# Patient Record
Sex: FEMALE | Race: WHITE | NOT HISPANIC OR LATINO | Employment: UNEMPLOYED | URBAN - METROPOLITAN AREA
[De-identification: names, ages, dates, MRNs, and addresses within clinical notes are randomized per-mention and may not be internally consistent; named-entity substitution may affect disease eponyms.]

---
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
## 2022-01-10 ENCOUNTER — HISTORICAL (OUTPATIENT)
Dept: LAB | Facility: HOSPITAL | Age: 22
End: 2022-01-10

## 2022-01-10 LAB
CREAT UR-MCNC: 160.4 MG/DL (ref 45–106)
MICROALBUMIN UR-MCNC: 350.7 UG/ML
MICROALBUMIN/CREAT RATIO PNL UR: 218.6 MG/GM CR (ref 0–30)
T4 FREE SERPL-MCNC: 0.89 NG/DL (ref 0.7–1.48)
TSH SERPL-ACNC: 5.32 UIU/ML (ref 0.35–4.94)

## 2022-02-11 ENCOUNTER — HISTORICAL (OUTPATIENT)
Dept: LAB | Facility: HOSPITAL | Age: 22
End: 2022-02-11

## 2022-04-11 ENCOUNTER — HISTORICAL (OUTPATIENT)
Dept: ADMINISTRATIVE | Facility: HOSPITAL | Age: 22
End: 2022-04-11
Payer: MEDICAID

## 2022-04-27 VITALS
BODY MASS INDEX: 40.72 KG/M2 | DIASTOLIC BLOOD PRESSURE: 70 MMHG | WEIGHT: 194 LBS | SYSTOLIC BLOOD PRESSURE: 122 MMHG | HEIGHT: 58 IN

## 2022-07-06 ENCOUNTER — OFFICE VISIT (OUTPATIENT)
Dept: FAMILY MEDICINE | Facility: CLINIC | Age: 22
End: 2022-07-06
Payer: MEDICAID

## 2022-07-06 VITALS
WEIGHT: 193 LBS | RESPIRATION RATE: 18 BRPM | OXYGEN SATURATION: 98 % | HEART RATE: 73 BPM | SYSTOLIC BLOOD PRESSURE: 110 MMHG | HEIGHT: 58 IN | BODY MASS INDEX: 40.51 KG/M2 | TEMPERATURE: 97 F | DIASTOLIC BLOOD PRESSURE: 70 MMHG

## 2022-07-06 DIAGNOSIS — J06.9 ACUTE URI: Primary | ICD-10-CM

## 2022-07-06 PROBLEM — E03.9 HYPOTHYROIDISM: Status: ACTIVE | Noted: 2019-11-07

## 2022-07-06 PROBLEM — Q90.9 DOWN SYNDROME: Status: ACTIVE | Noted: 2019-11-07

## 2022-07-06 PROBLEM — E66.01 SEVERE OBESITY DUE TO EXCESS CALORIES WITHOUT SERIOUS COMORBIDITY WITH BODY MASS INDEX (BMI) IN 99TH PERCENTILE FOR AGE IN PEDIATRIC PATIENT: Status: ACTIVE | Noted: 2021-06-22

## 2022-07-06 PROBLEM — E11.9 DIABETES MELLITUS: Status: ACTIVE | Noted: 2022-07-06

## 2022-07-06 PROBLEM — I38 HEART VALVE REGURGITATION: Status: ACTIVE | Noted: 2022-07-06

## 2022-07-06 PROCEDURE — 3008F BODY MASS INDEX DOCD: CPT | Mod: CPTII,,, | Performed by: FAMILY MEDICINE

## 2022-07-06 PROCEDURE — 3074F SYST BP LT 130 MM HG: CPT | Mod: CPTII,,, | Performed by: FAMILY MEDICINE

## 2022-07-06 PROCEDURE — 99213 OFFICE O/P EST LOW 20 MIN: CPT | Mod: ,,, | Performed by: FAMILY MEDICINE

## 2022-07-06 PROCEDURE — 1160F RVW MEDS BY RX/DR IN RCRD: CPT | Mod: CPTII,,, | Performed by: FAMILY MEDICINE

## 2022-07-06 PROCEDURE — 1159F MED LIST DOCD IN RCRD: CPT | Mod: CPTII,,, | Performed by: FAMILY MEDICINE

## 2022-07-06 PROCEDURE — 3074F PR MOST RECENT SYSTOLIC BLOOD PRESSURE < 130 MM HG: ICD-10-PCS | Mod: CPTII,,, | Performed by: FAMILY MEDICINE

## 2022-07-06 PROCEDURE — 3078F DIAST BP <80 MM HG: CPT | Mod: CPTII,,, | Performed by: FAMILY MEDICINE

## 2022-07-06 PROCEDURE — 3008F PR BODY MASS INDEX (BMI) DOCUMENTED: ICD-10-PCS | Mod: CPTII,,, | Performed by: FAMILY MEDICINE

## 2022-07-06 PROCEDURE — 1159F PR MEDICATION LIST DOCUMENTED IN MEDICAL RECORD: ICD-10-PCS | Mod: CPTII,,, | Performed by: FAMILY MEDICINE

## 2022-07-06 PROCEDURE — 3078F PR MOST RECENT DIASTOLIC BLOOD PRESSURE < 80 MM HG: ICD-10-PCS | Mod: CPTII,,, | Performed by: FAMILY MEDICINE

## 2022-07-06 PROCEDURE — 99213 PR OFFICE/OUTPT VISIT, EST, LEVL III, 20-29 MIN: ICD-10-PCS | Mod: ,,, | Performed by: FAMILY MEDICINE

## 2022-07-06 PROCEDURE — 1160F PR REVIEW ALL MEDS BY PRESCRIBER/CLIN PHARMACIST DOCUMENTED: ICD-10-PCS | Mod: CPTII,,, | Performed by: FAMILY MEDICINE

## 2022-07-06 RX ORDER — MONTELUKAST SODIUM 10 MG/1
10 TABLET ORAL NIGHTLY
Qty: 30 TABLET | Refills: 0 | Status: SHIPPED | OUTPATIENT
Start: 2022-07-06 | End: 2022-08-05

## 2022-07-06 RX ORDER — GLUCAGON 3 MG/1
POWDER NASAL
COMMUNITY
Start: 2022-06-07

## 2022-07-06 RX ORDER — CHLORPHENIR/PHENYLEPH/ASPIRIN 2-7.8-325
TABLET, EFFERVESCENT ORAL
COMMUNITY
Start: 2022-03-24

## 2022-07-06 RX ORDER — ALBUTEROL SULFATE 1.25 MG/3ML
1.25 SOLUTION RESPIRATORY (INHALATION) EVERY 6 HOURS PRN
COMMUNITY
Start: 2022-01-01 | End: 2024-01-02 | Stop reason: SDUPTHER

## 2022-07-06 RX ORDER — URINE ACETONE TEST STRIPS
STRIP MISCELLANEOUS
COMMUNITY
Start: 2022-06-29

## 2022-07-06 RX ORDER — INSULIN LISPRO 100 [IU]/ML
INJECTION, SOLUTION INTRAVENOUS; SUBCUTANEOUS
COMMUNITY
Start: 2022-06-29

## 2022-07-06 RX ORDER — BLOOD-GLUCOSE METER
EACH MISCELLANEOUS
COMMUNITY
Start: 2022-06-29

## 2022-07-06 RX ORDER — LEVOTHYROXINE SODIUM 100 UG/1
100 TABLET ORAL DAILY
COMMUNITY
Start: 2022-05-31

## 2022-07-06 RX ORDER — LANCETS 33 GAUGE
EACH MISCELLANEOUS
COMMUNITY
Start: 2022-03-24

## 2022-07-06 RX ORDER — PEN NEEDLE, DIABETIC 30 GX3/16"
NEEDLE, DISPOSABLE MISCELLANEOUS
COMMUNITY
Start: 2022-03-07

## 2022-07-06 RX ORDER — BROMPHENIRAMINE MALEATE AND PHENYLEPHRINE HYDROCHLORIDE 4; 10 MG/1; MG/1
1 TABLET, COATED ORAL EVERY 4 HOURS PRN
Qty: 30 EACH | Refills: 0 | Status: SHIPPED | OUTPATIENT
Start: 2022-07-06 | End: 2022-07-16

## 2022-07-06 RX ORDER — FLUTICASONE PROPIONATE 50 MCG
SPRAY, SUSPENSION (ML) NASAL
COMMUNITY
Start: 2021-10-02 | End: 2022-08-22

## 2022-07-06 RX ORDER — BLOOD-GLUCOSE,RECEIVER,CONT
EACH MISCELLANEOUS
COMMUNITY
Start: 2021-07-20

## 2022-07-06 RX ORDER — BLOOD-GLUCOSE SENSOR
EACH MISCELLANEOUS
COMMUNITY
Start: 2021-11-04

## 2022-07-06 RX ORDER — INSULIN GLARGINE 100 [IU]/ML
INJECTION, SOLUTION SUBCUTANEOUS
COMMUNITY

## 2022-07-06 RX ORDER — BLOOD-GLUCOSE TRANSMITTER
EACH MISCELLANEOUS
COMMUNITY
Start: 2021-11-04

## 2022-07-06 NOTE — PROGRESS NOTES
"Subjective:       Patient ID: Arlin Daniels is a 22 y.o. female.    Chief Complaint: COUGH, CONGESTION and SOB, X 2 DAYS      Cough      Review of Systems   Constitutional: Negative for chills and fever.   HENT: Positive for nasal congestion, rhinorrhea, sinus pressure/congestion and sore throat.    Eyes: Negative for itching.   Respiratory: Positive for cough.    Cardiovascular: Negative.            Objective:      /70 (BP Location: Left arm, Patient Position: Sitting, BP Method: Large (Manual))   Pulse 73   Temp 97.4 °F (36.3 °C)   Resp 18   Ht 4' 10" (1.473 m)   Wt 87.5 kg (193 lb)   SpO2 98%   BMI 40.34 kg/m²    Physical Exam  Constitutional:       General: She is not in acute distress.     Appearance: Normal appearance.   HENT:      Right Ear: Tympanic membrane and ear canal normal.      Left Ear: Tympanic membrane and ear canal normal.   Cardiovascular:      Rate and Rhythm: Normal rate and regular rhythm.   Pulmonary:      Effort: Pulmonary effort is normal.      Breath sounds: Normal breath sounds.   Musculoskeletal:         General: Normal range of motion.   Neurological:      Mental Status: She is alert.   Psychiatric:         Mood and Affect: Mood normal.         Behavior: Behavior normal.         Thought Content: Thought content normal.         Judgment: Judgment normal.             Assessment:       Problem List Items Addressed This Visit    None     Visit Diagnoses     Acute URI    -  Primary    Relevant Medications    brompheniramine-phenylephrine (RU-HIST D) 4-10 mg Tab    montelukast (SINGULAIR) 10 mg tablet           Plan:     1. Acute URI  Rx for Ruhist D  Rx her Singulair 10 mg q.p.m.  Avoid triggers  Monitor  Return to clinic with any concerns  "

## 2022-09-21 ENCOUNTER — HISTORICAL (OUTPATIENT)
Dept: ADMINISTRATIVE | Facility: HOSPITAL | Age: 22
End: 2022-09-21
Payer: MEDICAID

## 2022-11-10 ENCOUNTER — LAB VISIT (OUTPATIENT)
Dept: LAB | Facility: HOSPITAL | Age: 22
End: 2022-11-10
Payer: MEDICAID

## 2022-11-10 DIAGNOSIS — E03.9 HYPOTHYROIDISM, UNSPECIFIED TYPE: Primary | ICD-10-CM

## 2022-11-10 DIAGNOSIS — E10.9 DIABETES MELLITUS TYPE I: ICD-10-CM

## 2022-11-10 LAB
CREAT UR-MCNC: 222.9 MG/DL (ref 47–110)
MICROALBUMIN UR-MCNC: 8.7 UG/ML
MICROALBUMIN/CREAT RATIO PNL UR: 3.9 MG/GM CR (ref 0–30)
T4 FREE SERPL-MCNC: 0.87 NG/DL (ref 0.7–1.48)
TSH SERPL-ACNC: 0.41 UIU/ML (ref 0.35–4.94)

## 2022-11-10 PROCEDURE — 84439 ASSAY OF FREE THYROXINE: CPT

## 2022-11-10 PROCEDURE — 36415 COLL VENOUS BLD VENIPUNCTURE: CPT

## 2022-11-10 PROCEDURE — 82043 UR ALBUMIN QUANTITATIVE: CPT

## 2022-11-10 PROCEDURE — 84443 ASSAY THYROID STIM HORMONE: CPT

## 2023-01-09 ENCOUNTER — OFFICE VISIT (OUTPATIENT)
Dept: FAMILY MEDICINE | Facility: CLINIC | Age: 23
End: 2023-01-09
Payer: MEDICAID

## 2023-01-09 VITALS
RESPIRATION RATE: 18 BRPM | HEIGHT: 58 IN | WEIGHT: 198.19 LBS | SYSTOLIC BLOOD PRESSURE: 122 MMHG | OXYGEN SATURATION: 96 % | TEMPERATURE: 97 F | DIASTOLIC BLOOD PRESSURE: 76 MMHG | HEART RATE: 79 BPM | BODY MASS INDEX: 41.6 KG/M2

## 2023-01-09 DIAGNOSIS — N61.0 MASTITIS: Primary | ICD-10-CM

## 2023-01-09 DIAGNOSIS — R45.86 MOOD CHANGES: ICD-10-CM

## 2023-01-09 PROCEDURE — 99214 PR OFFICE/OUTPT VISIT, EST, LEVL IV, 30-39 MIN: ICD-10-PCS | Mod: ,,, | Performed by: FAMILY MEDICINE

## 2023-01-09 PROCEDURE — 3078F DIAST BP <80 MM HG: CPT | Mod: CPTII,,, | Performed by: FAMILY MEDICINE

## 2023-01-09 PROCEDURE — 1160F PR REVIEW ALL MEDS BY PRESCRIBER/CLIN PHARMACIST DOCUMENTED: ICD-10-PCS | Mod: CPTII,,, | Performed by: FAMILY MEDICINE

## 2023-01-09 PROCEDURE — 1159F MED LIST DOCD IN RCRD: CPT | Mod: CPTII,,, | Performed by: FAMILY MEDICINE

## 2023-01-09 PROCEDURE — 1160F RVW MEDS BY RX/DR IN RCRD: CPT | Mod: CPTII,,, | Performed by: FAMILY MEDICINE

## 2023-01-09 PROCEDURE — 99214 OFFICE O/P EST MOD 30 MIN: CPT | Mod: ,,, | Performed by: FAMILY MEDICINE

## 2023-01-09 PROCEDURE — 3078F PR MOST RECENT DIASTOLIC BLOOD PRESSURE < 80 MM HG: ICD-10-PCS | Mod: CPTII,,, | Performed by: FAMILY MEDICINE

## 2023-01-09 PROCEDURE — 3008F BODY MASS INDEX DOCD: CPT | Mod: CPTII,,, | Performed by: FAMILY MEDICINE

## 2023-01-09 PROCEDURE — 1159F PR MEDICATION LIST DOCUMENTED IN MEDICAL RECORD: ICD-10-PCS | Mod: CPTII,,, | Performed by: FAMILY MEDICINE

## 2023-01-09 PROCEDURE — 3074F SYST BP LT 130 MM HG: CPT | Mod: CPTII,,, | Performed by: FAMILY MEDICINE

## 2023-01-09 PROCEDURE — 3008F PR BODY MASS INDEX (BMI) DOCUMENTED: ICD-10-PCS | Mod: CPTII,,, | Performed by: FAMILY MEDICINE

## 2023-01-09 PROCEDURE — 3074F PR MOST RECENT SYSTOLIC BLOOD PRESSURE < 130 MM HG: ICD-10-PCS | Mod: CPTII,,, | Performed by: FAMILY MEDICINE

## 2023-01-09 RX ORDER — MUPIROCIN 20 MG/G
OINTMENT TOPICAL 3 TIMES DAILY
Qty: 15 G | Refills: 1 | Status: SHIPPED | OUTPATIENT
Start: 2023-01-09 | End: 2023-02-24 | Stop reason: SDUPTHER

## 2023-01-09 RX ORDER — SULFAMETHOXAZOLE AND TRIMETHOPRIM 800; 160 MG/1; MG/1
1 TABLET ORAL 2 TIMES DAILY
Qty: 20 TABLET | Refills: 0 | Status: SHIPPED | OUTPATIENT
Start: 2023-01-09 | End: 2023-01-19

## 2023-01-09 RX ORDER — ARIPIPRAZOLE 2 MG/1
2 TABLET ORAL DAILY
Qty: 30 TABLET | Refills: 11 | Status: SHIPPED | OUTPATIENT
Start: 2023-01-09 | End: 2023-03-28

## 2023-01-09 NOTE — PROGRESS NOTES
"Subjective:       Patient ID: Arlin Daniels is a 22 y.o. female.    Chief Complaint: Boils under Breast; & cough      Abscess      Review of Systems   Constitutional: Negative.    Respiratory: Negative.     Cardiovascular: Negative.    Integumentary:         Abscess: located on  left breast, using OTC triple abx   Psychiatric/Behavioral:          Mood changes: Mother reports medication no longer working as well         Objective:      /76 (BP Location: Right arm, Patient Position: Sitting, BP Method: Large (Manual))   Pulse 79   Temp 97.2 °F (36.2 °C)   Resp 18   Ht 4' 10" (1.473 m)   Wt 89.9 kg (198 lb 3.2 oz)   SpO2 96%   BMI 41.42 kg/m²    Physical Exam  Constitutional:       Appearance: Normal appearance.   Cardiovascular:      Rate and Rhythm: Normal rate and regular rhythm.      Heart sounds: Normal heart sounds.   Pulmonary:      Effort: Pulmonary effort is normal.      Breath sounds: Normal breath sounds.   Skin:     Comments: Mastitis noted on left breast   Neurological:      Mental Status: She is alert.   Psychiatric:         Mood and Affect: Mood normal.         Behavior: Behavior normal.         Thought Content: Thought content normal.         Judgment: Judgment normal.           Assessment:       Problem List Items Addressed This Visit    None  Visit Diagnoses       Mastitis    -  Primary    Relevant Medications    sulfamethoxazole-trimethoprim 800-160mg (BACTRIM DS) 800-160 mg Tab    mupirocin (BACTROBAN) 2 % ointment    Mood changes        Relevant Medications    ARIPiprazole (ABILIFY) 2 MG Tab               Plan:   1. Mastitis  -     sulfamethoxazole-trimethoprim 800-160mg (BACTRIM DS) 800-160 mg Tab; Take 1 tablet by mouth 2 (two) times daily. for 10 days  Dispense: 20 tablet; Refill: 0  -     mupirocin (BACTROBAN) 2 % ointment; Apply topically 3 (three) times daily.  Dispense: 15 g; Refill: 1  Rx Bactrim DS b.i.d. times 10 days   Rx for Bactroban topical   Wash area b.i.d. with " antibacterial soap   Monitor  Return to clinic with any concerns    2. Mood changes  -     ARIPiprazole (ABILIFY) 2 MG Tab; Take 1 tablet (2 mg total) by mouth once daily.  Dispense: 30 tablet; Refill: 11  Continue Celexa 40 mg q.day  Add Abilify 2 mg q.day  Monitor   Return to clinic with any concerns

## 2023-01-10 DIAGNOSIS — R11.2 NAUSEA AND VOMITING, UNSPECIFIED VOMITING TYPE: Primary | ICD-10-CM

## 2023-01-10 RX ORDER — ONDANSETRON 4 MG/1
4 TABLET, FILM COATED ORAL 2 TIMES DAILY
Qty: 14 TABLET | Refills: 0 | Status: SHIPPED | OUTPATIENT
Start: 2023-01-10

## 2023-01-17 DIAGNOSIS — R05.9 COUGH, UNSPECIFIED TYPE: Primary | ICD-10-CM

## 2023-01-17 RX ORDER — PROMETHAZINE HYDROCHLORIDE AND DEXTROMETHORPHAN HYDROBROMIDE 6.25; 15 MG/5ML; MG/5ML
5 SYRUP ORAL EVERY 4 HOURS PRN
Qty: 118 ML | Refills: 0 | Status: SHIPPED | OUTPATIENT
Start: 2023-01-17 | End: 2023-01-27

## 2023-02-22 DIAGNOSIS — N61.0 MASTITIS: ICD-10-CM

## 2023-02-22 DIAGNOSIS — L02.92 BOIL: Primary | ICD-10-CM

## 2023-02-22 RX ORDER — SULFAMETHOXAZOLE AND TRIMETHOPRIM 800; 160 MG/1; MG/1
1 TABLET ORAL 2 TIMES DAILY
Qty: 14 TABLET | Refills: 0 | Status: SHIPPED | OUTPATIENT
Start: 2023-02-22 | End: 2023-04-25

## 2023-02-24 DIAGNOSIS — N61.0 MASTITIS: ICD-10-CM

## 2023-02-24 RX ORDER — MUPIROCIN 20 MG/G
OINTMENT TOPICAL 2 TIMES DAILY
Qty: 15 G | Refills: 1 | Status: SHIPPED | OUTPATIENT
Start: 2023-02-24 | End: 2023-06-13 | Stop reason: SDUPTHER

## 2023-02-27 DIAGNOSIS — Q90.9 DOWN SYNDROME: ICD-10-CM

## 2023-02-27 RX ORDER — CITALOPRAM 40 MG/1
40 TABLET, FILM COATED ORAL DAILY
Qty: 30 TABLET | Refills: 1 | Status: SHIPPED | OUTPATIENT
Start: 2023-02-27 | End: 2023-03-28

## 2023-03-28 ENCOUNTER — HOSPITAL ENCOUNTER (OUTPATIENT)
Dept: RADIOLOGY | Facility: HOSPITAL | Age: 23
Discharge: HOME | End: 2023-03-28
Attending: FAMILY MEDICINE
Payer: MEDICAID

## 2023-03-28 ENCOUNTER — OFFICE VISIT (OUTPATIENT)
Dept: FAMILY MEDICINE | Facility: CLINIC | Age: 23
End: 2023-03-28
Payer: MEDICAID

## 2023-03-28 VITALS
SYSTOLIC BLOOD PRESSURE: 110 MMHG | BODY MASS INDEX: 43.45 KG/M2 | HEIGHT: 58 IN | WEIGHT: 207 LBS | OXYGEN SATURATION: 98 % | HEART RATE: 74 BPM | RESPIRATION RATE: 18 BRPM | TEMPERATURE: 97 F | DIASTOLIC BLOOD PRESSURE: 66 MMHG

## 2023-03-28 DIAGNOSIS — M25.552 BILATERAL HIP PAIN: ICD-10-CM

## 2023-03-28 DIAGNOSIS — F32.A DEPRESSION, UNSPECIFIED DEPRESSION TYPE: ICD-10-CM

## 2023-03-28 DIAGNOSIS — M25.552 BILATERAL HIP PAIN: Primary | ICD-10-CM

## 2023-03-28 DIAGNOSIS — M25.551 BILATERAL HIP PAIN: Primary | ICD-10-CM

## 2023-03-28 DIAGNOSIS — M25.551 BILATERAL HIP PAIN: ICD-10-CM

## 2023-03-28 PROCEDURE — 99214 PR OFFICE/OUTPT VISIT, EST, LEVL IV, 30-39 MIN: ICD-10-PCS | Mod: ,,, | Performed by: FAMILY MEDICINE

## 2023-03-28 PROCEDURE — 3078F PR MOST RECENT DIASTOLIC BLOOD PRESSURE < 80 MM HG: ICD-10-PCS | Mod: CPTII,,, | Performed by: FAMILY MEDICINE

## 2023-03-28 PROCEDURE — 73502 X-RAY EXAM HIP UNI 2-3 VIEWS: CPT | Mod: TC,LT

## 2023-03-28 PROCEDURE — 3074F PR MOST RECENT SYSTOLIC BLOOD PRESSURE < 130 MM HG: ICD-10-PCS | Mod: CPTII,,, | Performed by: FAMILY MEDICINE

## 2023-03-28 PROCEDURE — 1159F MED LIST DOCD IN RCRD: CPT | Mod: CPTII,,, | Performed by: FAMILY MEDICINE

## 2023-03-28 PROCEDURE — 73502 X-RAY EXAM HIP UNI 2-3 VIEWS: CPT | Mod: TC,RT

## 2023-03-28 PROCEDURE — 99214 OFFICE O/P EST MOD 30 MIN: CPT | Mod: ,,, | Performed by: FAMILY MEDICINE

## 2023-03-28 PROCEDURE — 1160F PR REVIEW ALL MEDS BY PRESCRIBER/CLIN PHARMACIST DOCUMENTED: ICD-10-PCS | Mod: CPTII,,, | Performed by: FAMILY MEDICINE

## 2023-03-28 PROCEDURE — 1159F PR MEDICATION LIST DOCUMENTED IN MEDICAL RECORD: ICD-10-PCS | Mod: CPTII,,, | Performed by: FAMILY MEDICINE

## 2023-03-28 PROCEDURE — 3074F SYST BP LT 130 MM HG: CPT | Mod: CPTII,,, | Performed by: FAMILY MEDICINE

## 2023-03-28 PROCEDURE — 3078F DIAST BP <80 MM HG: CPT | Mod: CPTII,,, | Performed by: FAMILY MEDICINE

## 2023-03-28 PROCEDURE — 1160F RVW MEDS BY RX/DR IN RCRD: CPT | Mod: CPTII,,, | Performed by: FAMILY MEDICINE

## 2023-03-28 PROCEDURE — 3008F PR BODY MASS INDEX (BMI) DOCUMENTED: ICD-10-PCS | Mod: CPTII,,, | Performed by: FAMILY MEDICINE

## 2023-03-28 PROCEDURE — 3008F BODY MASS INDEX DOCD: CPT | Mod: CPTII,,, | Performed by: FAMILY MEDICINE

## 2023-03-28 RX ORDER — DULOXETIN HYDROCHLORIDE 30 MG/1
30 CAPSULE, DELAYED RELEASE ORAL DAILY
Qty: 30 CAPSULE | Refills: 1 | Status: SHIPPED | OUTPATIENT
Start: 2023-03-28 | End: 2023-04-25 | Stop reason: SDUPTHER

## 2023-03-28 NOTE — PROGRESS NOTES
"Subjective:       Patient ID: rAlin Daniels is a 23 y.o. female.    Chief Complaint: pain in legs and hips x months       Leg pain      Review of Systems   Constitutional: Negative.    Respiratory: Negative.     Cardiovascular: Negative.    Musculoskeletal:         Bilateral hip pain: pain with ambulation, reports not walking to walk due to pain, no OTC medication, no swelling, no recent falls/trauma, fam hx of hip problems   Psychiatric/Behavioral:          Depression: reports weight gain with medication, family desires to change medication         Objective:      /66 (BP Location: Left arm, Patient Position: Sitting, BP Method: Large (Manual))   Pulse 74   Temp 97.1 °F (36.2 °C)   Resp 18   Ht 4' 10" (1.473 m)   Wt 93.9 kg (207 lb)   SpO2 98%   BMI 43.26 kg/m²    Physical Exam  Constitutional:       Appearance: Normal appearance.   Cardiovascular:      Rate and Rhythm: Normal rate and regular rhythm.      Heart sounds: Normal heart sounds.   Pulmonary:      Effort: Pulmonary effort is normal.      Breath sounds: Normal breath sounds.   Musculoskeletal:      Comments: TTP on bilateral hips, antalgic gait   Neurological:      Mental Status: She is alert.   Psychiatric:         Mood and Affect: Mood normal.         Behavior: Behavior normal.         Thought Content: Thought content normal.         Judgment: Judgment normal.             Assessment:       Problem List Items Addressed This Visit    None  Visit Diagnoses       Bilateral hip pain    -  Primary    Relevant Orders    X-Ray Hip 2 or 3 views Left (with Pelvis when performed)    X-Ray Hip 2 or 3 views Right (with Pelvis when performed)    Depression, unspecified depression type        Relevant Medications    DULoxetine (CYMBALTA) 30 MG capsule               Plan:   1. Bilateral hip pain  -     X-Ray Hip 2 or 3 views Left (with Pelvis when performed); Future; Expected date: 03/28/2023  -     X-Ray Hip 2 or 3 views Right (with Pelvis when " performed); Future; Expected date: 03/28/2023  Schedule bilateral hip x-rays   OTC NSAIDs p.r.n.  Monitor   Return to clinic with any concerns     2. Depression, unspecified depression type  -     DULoxetine (CYMBALTA) 30 MG capsule; Take 1 capsule (30 mg total) by mouth once daily.  Dispense: 30 capsule; Refill: 1  Stop Celexa/Abilify   Start Cymbalta 30 mg q.day  Relaxation technique  Monitor   Return to clinic with any concerns

## 2023-03-29 DIAGNOSIS — M25.551 BILATERAL HIP PAIN: Primary | ICD-10-CM

## 2023-03-29 DIAGNOSIS — M25.552 BILATERAL HIP PAIN: Primary | ICD-10-CM

## 2023-04-04 DIAGNOSIS — M25.551 BILATERAL HIP PAIN: Primary | ICD-10-CM

## 2023-04-04 DIAGNOSIS — M25.552 BILATERAL HIP PAIN: Primary | ICD-10-CM

## 2023-04-17 ENCOUNTER — PATIENT MESSAGE (OUTPATIENT)
Dept: ADMINISTRATIVE | Facility: HOSPITAL | Age: 23
End: 2023-04-17
Payer: MEDICAID

## 2023-04-25 ENCOUNTER — OFFICE VISIT (OUTPATIENT)
Dept: FAMILY MEDICINE | Facility: CLINIC | Age: 23
End: 2023-04-25
Payer: MEDICAID

## 2023-04-25 VITALS
SYSTOLIC BLOOD PRESSURE: 106 MMHG | TEMPERATURE: 98 F | OXYGEN SATURATION: 99 % | WEIGHT: 205.19 LBS | BODY MASS INDEX: 43.07 KG/M2 | RESPIRATION RATE: 18 BRPM | HEIGHT: 58 IN | DIASTOLIC BLOOD PRESSURE: 72 MMHG | HEART RATE: 100 BPM

## 2023-04-25 DIAGNOSIS — Z01.419 ENCOUNTER FOR ANNUAL ROUTINE GYNECOLOGICAL EXAMINATION: ICD-10-CM

## 2023-04-25 DIAGNOSIS — F32.A DEPRESSION, UNSPECIFIED DEPRESSION TYPE: Primary | ICD-10-CM

## 2023-04-25 PROCEDURE — 3008F PR BODY MASS INDEX (BMI) DOCUMENTED: ICD-10-PCS | Mod: CPTII,,, | Performed by: FAMILY MEDICINE

## 2023-04-25 PROCEDURE — 3074F PR MOST RECENT SYSTOLIC BLOOD PRESSURE < 130 MM HG: ICD-10-PCS | Mod: CPTII,,, | Performed by: FAMILY MEDICINE

## 2023-04-25 PROCEDURE — 3078F DIAST BP <80 MM HG: CPT | Mod: CPTII,,, | Performed by: FAMILY MEDICINE

## 2023-04-25 PROCEDURE — 1160F RVW MEDS BY RX/DR IN RCRD: CPT | Mod: CPTII,,, | Performed by: FAMILY MEDICINE

## 2023-04-25 PROCEDURE — 3078F PR MOST RECENT DIASTOLIC BLOOD PRESSURE < 80 MM HG: ICD-10-PCS | Mod: CPTII,,, | Performed by: FAMILY MEDICINE

## 2023-04-25 PROCEDURE — 3008F BODY MASS INDEX DOCD: CPT | Mod: CPTII,,, | Performed by: FAMILY MEDICINE

## 2023-04-25 PROCEDURE — 1160F PR REVIEW ALL MEDS BY PRESCRIBER/CLIN PHARMACIST DOCUMENTED: ICD-10-PCS | Mod: CPTII,,, | Performed by: FAMILY MEDICINE

## 2023-04-25 PROCEDURE — 1159F PR MEDICATION LIST DOCUMENTED IN MEDICAL RECORD: ICD-10-PCS | Mod: CPTII,,, | Performed by: FAMILY MEDICINE

## 2023-04-25 PROCEDURE — 1159F MED LIST DOCD IN RCRD: CPT | Mod: CPTII,,, | Performed by: FAMILY MEDICINE

## 2023-04-25 PROCEDURE — 99214 PR OFFICE/OUTPT VISIT, EST, LEVL IV, 30-39 MIN: ICD-10-PCS | Mod: ,,, | Performed by: FAMILY MEDICINE

## 2023-04-25 PROCEDURE — 3074F SYST BP LT 130 MM HG: CPT | Mod: CPTII,,, | Performed by: FAMILY MEDICINE

## 2023-04-25 PROCEDURE — 99214 OFFICE O/P EST MOD 30 MIN: CPT | Mod: ,,, | Performed by: FAMILY MEDICINE

## 2023-04-25 RX ORDER — DULOXETIN HYDROCHLORIDE 60 MG/1
60 CAPSULE, DELAYED RELEASE ORAL DAILY
Qty: 30 CAPSULE | Refills: 3 | Status: SHIPPED | OUTPATIENT
Start: 2023-04-25 | End: 2023-06-13

## 2023-04-25 NOTE — PROGRESS NOTES
"Subjective:       Patient ID: Arlin Daniels is a 23 y.o. female.    Chief Complaint: 1 month follow up      Follow-up      Review of Systems   Constitutional: Negative.    Respiratory: Negative.     Cardiovascular: Negative.    Genitourinary:         Menorrhagia   Psychiatric/Behavioral:          Depression: improving with medication, mother reports that she continues to have mood swings         Objective:      /72 (BP Location: Left arm, Patient Position: Sitting, BP Method: Large (Manual))   Pulse 100   Temp 97.6 °F (36.4 °C)   Resp 18   Ht 4' 10" (1.473 m)   Wt 93.1 kg (205 lb 3.2 oz)   SpO2 99%   BMI 42.89 kg/m²    Physical Exam  Constitutional:       Appearance: Normal appearance.   Cardiovascular:      Rate and Rhythm: Normal rate and regular rhythm.      Heart sounds: Normal heart sounds.   Pulmonary:      Effort: Pulmonary effort is normal.      Breath sounds: Normal breath sounds.   Neurological:      Mental Status: She is alert.   Psychiatric:         Mood and Affect: Mood normal.         Behavior: Behavior normal.         Thought Content: Thought content normal.         Judgment: Judgment normal.             Assessment:       Problem List Items Addressed This Visit          Psychiatric    Depression - Primary    Relevant Medications    DULoxetine (CYMBALTA) 60 MG capsule     Other Visit Diagnoses       Encounter for annual routine gynecological examination        Relevant Orders    Ambulatory referral/consult to Gynecology               Plan:   1. Depression, unspecified depression type  -     DULoxetine (CYMBALTA) 60 MG capsule; Take 1 capsule (60 mg total) by mouth once daily.  Dispense: 30 capsule; Refill: 3  Increase Cymbalta to 60 mg q.day  Relaxation technique  Monitor  Return to clinic with any concerns     2. Encounter for annual routine gynecological examination  -     Ambulatory referral/consult to Gynecology; Future; Expected date: 05/02/2023  Schedule Pap with Dr. Aguirre   "

## 2023-04-30 ENCOUNTER — HOSPITAL ENCOUNTER (EMERGENCY)
Facility: HOSPITAL | Age: 23
Discharge: HOME | End: 2023-04-30
Attending: FAMILY MEDICINE
Payer: MEDICAID

## 2023-04-30 VITALS
HEART RATE: 89 BPM | DIASTOLIC BLOOD PRESSURE: 78 MMHG | TEMPERATURE: 96 F | SYSTOLIC BLOOD PRESSURE: 128 MMHG | OXYGEN SATURATION: 98 % | RESPIRATION RATE: 20 BRPM | HEIGHT: 58 IN | BODY MASS INDEX: 43.03 KG/M2 | WEIGHT: 205 LBS

## 2023-04-30 DIAGNOSIS — A08.4 VIRAL GASTROENTERITIS: Primary | ICD-10-CM

## 2023-04-30 LAB
ABS NEUT CALC (OHS): 10.27 X10(3)/MCL (ref 2.1–9.2)
ALBUMIN SERPL-MCNC: 3.9 G/DL (ref 3.5–5)
ALBUMIN/GLOB SERPL: 1 RATIO (ref 1.1–2)
ALP SERPL-CCNC: 78 UNIT/L (ref 40–150)
ALT SERPL-CCNC: 16 UNIT/L (ref 0–55)
AST SERPL-CCNC: 19 UNIT/L (ref 5–34)
BILIRUBIN DIRECT+TOT PNL SERPL-MCNC: 0.9 MG/DL
BUN SERPL-MCNC: 18 MG/DL (ref 7–18.7)
CALCIUM SERPL-MCNC: 10 MG/DL (ref 8.4–10.2)
CHLORIDE SERPL-SCNC: 105 MMOL/L (ref 98–107)
CO2 SERPL-SCNC: 21 MMOL/L (ref 22–29)
CREAT SERPL-MCNC: 1.08 MG/DL (ref 0.55–1.02)
ERYTHROCYTE [DISTWIDTH] IN BLOOD BY AUTOMATED COUNT: 13.2 % (ref 11.5–17)
GFR SERPLBLD CREATININE-BSD FMLA CKD-EPI: >60 MLS/MIN/1.73/M2
GLOBULIN SER-MCNC: 4.1 GM/DL (ref 2.4–3.5)
GLUCOSE SERPL-MCNC: 228 MG/DL (ref 74–100)
HCT VFR BLD AUTO: 44.6 % (ref 37–47)
HGB BLD-MCNC: 15.2 G/DL (ref 12–16)
LYMPHOCYTES NFR BLD MANUAL: 0.32 X10(3)/MCL
LYMPHOCYTES NFR BLD MANUAL: 3 % (ref 13–40)
MCH RBC QN AUTO: 32.8 PG (ref 27–31)
MCHC RBC AUTO-ENTMCNC: 34.1 G/DL (ref 33–36)
MCV RBC AUTO: 96.3 FL (ref 80–94)
MONOCYTES NFR BLD MANUAL: 0.11 X10(3)/MCL (ref 0.1–1.3)
MONOCYTES NFR BLD MANUAL: 1 % (ref 2–11)
NEUTROPHILS NFR BLD MANUAL: 91 % (ref 47–80)
NEUTS BAND NFR BLD MANUAL: 5 % (ref 0–11)
NRBC BLD AUTO-RTO: 0 %
PLATELET # BLD AUTO: 201 X10(3)/MCL (ref 130–400)
PLATELET # BLD EST: ADEQUATE 10*3/UL
PMV BLD AUTO: 9.9 FL (ref 7.4–10.4)
POTASSIUM SERPL-SCNC: 4.8 MMOL/L (ref 3.5–5.1)
PROT SERPL-MCNC: 8 GM/DL (ref 6.4–8.3)
RBC # BLD AUTO: 4.63 X10(6)/MCL (ref 4.2–5.4)
SODIUM SERPL-SCNC: 138 MMOL/L (ref 136–145)
WBC # SPEC AUTO: 10.7 X10(3)/MCL (ref 4.5–11.5)

## 2023-04-30 PROCEDURE — 63600175 PHARM REV CODE 636 W HCPCS: Performed by: FAMILY MEDICINE

## 2023-04-30 PROCEDURE — 96374 THER/PROPH/DIAG INJ IV PUSH: CPT

## 2023-04-30 PROCEDURE — 96361 HYDRATE IV INFUSION ADD-ON: CPT

## 2023-04-30 PROCEDURE — 80053 COMPREHEN METABOLIC PANEL: CPT | Performed by: FAMILY MEDICINE

## 2023-04-30 PROCEDURE — 25000003 PHARM REV CODE 250: Performed by: FAMILY MEDICINE

## 2023-04-30 PROCEDURE — 85025 COMPLETE CBC W/AUTO DIFF WBC: CPT | Performed by: FAMILY MEDICINE

## 2023-04-30 PROCEDURE — 82962 GLUCOSE BLOOD TEST: CPT

## 2023-04-30 PROCEDURE — 99284 EMERGENCY DEPT VISIT MOD MDM: CPT | Mod: 25

## 2023-04-30 PROCEDURE — 85027 COMPLETE CBC AUTOMATED: CPT | Performed by: FAMILY MEDICINE

## 2023-04-30 RX ORDER — ONDANSETRON 2 MG/ML
4 INJECTION INTRAMUSCULAR; INTRAVENOUS
Status: COMPLETED | OUTPATIENT
Start: 2023-04-30 | End: 2023-04-30

## 2023-04-30 RX ORDER — DICYCLOMINE HYDROCHLORIDE 10 MG/1
20 CAPSULE ORAL
Status: COMPLETED | OUTPATIENT
Start: 2023-04-30 | End: 2023-04-30

## 2023-04-30 RX ORDER — ARIPIPRAZOLE 2 MG/1
1 TABLET ORAL DAILY
COMMUNITY
Start: 2023-02-08 | End: 2023-06-13 | Stop reason: SDUPTHER

## 2023-04-30 RX ORDER — CITALOPRAM 40 MG/1
TABLET, FILM COATED ORAL
COMMUNITY
Start: 2023-03-28 | End: 2023-06-13

## 2023-04-30 RX ORDER — ONDANSETRON 4 MG/1
4 TABLET, FILM COATED ORAL EVERY 6 HOURS
Qty: 20 TABLET | Refills: 0 | Status: SHIPPED | OUTPATIENT
Start: 2023-04-30 | End: 2023-05-05

## 2023-04-30 RX ORDER — INSULIN PMP CART,AUT,G6/7,CNTR
EACH SUBCUTANEOUS
COMMUNITY
Start: 2023-04-03

## 2023-04-30 RX ORDER — DICYCLOMINE HYDROCHLORIDE 10 MG/1
10 CAPSULE ORAL EVERY 6 HOURS PRN
Qty: 20 CAPSULE | Refills: 0 | Status: SHIPPED | OUTPATIENT
Start: 2023-04-30

## 2023-04-30 RX ADMIN — ONDANSETRON HYDROCHLORIDE 4 MG: 2 SOLUTION INTRAMUSCULAR; INTRAVENOUS at 12:04

## 2023-04-30 RX ADMIN — SODIUM CHLORIDE 1000 ML: 9 INJECTION, SOLUTION INTRAVENOUS at 12:04

## 2023-04-30 RX ADMIN — DICYCLOMINE HYDROCHLORIDE 20 MG: 10 CAPSULE ORAL at 01:04

## 2023-04-30 NOTE — ED NOTES
Pt ambulated with both parents to room 3 with steady gait.  Stated that child wok up vomiting and had an episode where she passed out at home.  Upon arrival pt was vomiting and could not stop the BM from coming in her clothing.  Pt has a hx of MR and lives at home with family.  Symtoms started today.  Mother reports that child is a type I diabetic with an insulin pump.

## 2023-04-30 NOTE — ED PROVIDER NOTES
Encounter Date: 4/30/2023       History     Chief Complaint   Patient presents with    Nausea    Vomiting    Diarrhea     N/V/D started at 3 am today. Also having weakness     This patient is a 23-year-old female, with down syndrome who comes in with nausea vomiting and diarrhea that started 3:00 a.m. this morning.  Mother states that she has had and innumerable number of diarrhea stools an approximately 5 episodes of vomiting.  She states that she put her in the bathtub to get her cleaned and when she came out of the bathtub, she passed out for few seconds.    The history is provided by the patient and a parent.   Nausea  This is a new problem. The current episode started 6 to 12 hours ago. The problem occurs constantly. The problem has not changed since onset.Pertinent negatives include no chest pain and no abdominal pain. Nothing aggravates the symptoms. Nothing relieves the symptoms. She has tried nothing for the symptoms. The treatment provided no relief.   Vomiting   Associated symptoms include diarrhea. Pertinent negatives include no abdominal pain.   Diarrhea   Associated symptoms include vomiting. Pertinent negatives include no abdominal pain.   Review of patient's allergies indicates:  No Known Allergies  Past Medical History:   Diagnosis Date    Down syndrome     Heart valve insufficiency     Hypothyroidism      Past Surgical History:   Procedure Laterality Date    TUMOR REMOVAL       History reviewed. No pertinent family history.  Social History     Tobacco Use    Smoking status: Never    Smokeless tobacco: Never   Substance Use Topics    Alcohol use: Not Currently    Drug use: Not Currently     Review of Systems   Constitutional: Negative.    HENT: Negative.     Respiratory: Negative.     Cardiovascular: Negative.  Negative for chest pain.   Gastrointestinal:  Positive for diarrhea, nausea and vomiting. Negative for abdominal pain.   Endocrine: Negative.    Musculoskeletal: Negative.    Neurological:  Negative.    Psychiatric/Behavioral: Negative.     All other systems reviewed and are negative.    Physical Exam     Initial Vitals   BP Pulse Resp Temp SpO2   04/30/23 1239 04/30/23 1234 04/30/23 1234 04/30/23 1234 04/30/23 1239   (!) 149/116 97 18 96.4 °F (35.8 °C) 96 %      MAP       --                Physical Exam    Nursing note and vitals reviewed.  Constitutional: She appears well-developed.   HENT:   Head: Normocephalic.   Eyes: Pupils are equal, round, and reactive to light.   Neck:   Normal range of motion.  Cardiovascular:  Normal rate, regular rhythm and normal heart sounds.           Pulmonary/Chest: Breath sounds normal.   Abdominal: Abdomen is soft and flat. Bowel sounds are decreased. There is no abdominal tenderness.   Musculoskeletal:         General: Normal range of motion.      Cervical back: Normal range of motion.     Neurological: She is alert and oriented to person, place, and time. GCS score is 15. GCS eye subscore is 4. GCS verbal subscore is 5. GCS motor subscore is 6.   Skin: Skin is warm and dry.   Psychiatric: She has a normal mood and affect.       ED Course   Procedures  Labs Reviewed   COMPREHENSIVE METABOLIC PANEL - Abnormal; Notable for the following components:       Result Value    Carbon Dioxide 21 (*)     Glucose Level 228 (*)     Creatinine 1.08 (*)     Globulin 4.1 (*)     Albumin/Globulin Ratio 1.0 (*)     All other components within normal limits   CBC WITH DIFFERENTIAL - Abnormal; Notable for the following components:    MCV 96.3 (*)     MCH 32.8 (*)     All other components within normal limits   MANUAL DIFFERENTIAL - Abnormal; Notable for the following components:    Neut Man 91 (*)     Lymph Man 3 (*)     Monocyte Man 1 (*)     Abs Neut calc 10.272 (*)     Abs Lymp 0.321 (*)     All other components within normal limits   CBC W/ AUTO DIFFERENTIAL    Narrative:     The following orders were created for panel order CBC auto differential.  Procedure                                Abnormality         Status                     ---------                               -----------         ------                     CBC with Differential[[id number]]        Abnormal            Final result               Manual Differential[[id number]]          Abnormal            Final result                 Please view results for these tests on the individual orders.          Imaging Results    None          Medications   sodium chloride 0.9% bolus 1,000 mL 1,000 mL (0 mLs Intravenous Stopped 4/30/23 1354)   ondansetron injection 4 mg (4 mg Intravenous Given 4/30/23 1247)   dicyclomine capsule 20 mg (20 mg Oral Given 4/30/23 1302)     Medical Decision Making:   Initial Assessment:   This patient is a 23-year-old female with Down's syndrome who comes in with her parents, complaining of nausea vomiting and diarrhea since 3:00 a.m..  Patient has had an overall amount of diarrhea and at least 5 episodes of vomiting.  Mom states that she passed out when she got out of the shower and she became concerned this point.  Patient is awake alert and oriented on arrival to the emergency room  Differential Diagnosis:   Viral gastroenteritis  Clinical Tests:   Lab Tests: Ordered and Reviewed  ED Management:  Lab work was drawn on this patient.  She is found to have a carbon dioxide of 21, a glucose of 228 and a creatinine of 1.08.  Her white blood cell count is 10.7 which is normal.  Patient given 1 L of fluids in the emergency room and some Zofran through the IV.  Patient feels better after this L done.  She is also given Bentyl 20 mg in the emergency room and written for Bentyl and Zofran for home.  Patient feels much better                        Clinical Impression:   Final diagnoses:  [A08.4] Viral gastroenteritis (Primary)        ED Disposition Condition    Discharge Stable          ED Prescriptions       Medication Sig Dispense Start Date End Date Auth. Provider    dicyclomine (BENTYL) 10 MG capsule Take 1 capsule  (10 mg total) by mouth every 6 (six) hours as needed (diarrhea). 20 capsule 4/30/2023 -- Nguyễn Dixon MD    ondansetron (ZOFRAN) 4 MG tablet Take 1 tablet (4 mg total) by mouth every 6 (six) hours. for 20 doses 20 tablet 4/30/2023 5/5/2023 Nguyễn Dixon MD          Follow-up Information       Follow up With Specialties Details Why Contact Info    Ferny Whiting MD Family Medicine Schedule an appointment as soon as possible for a visit   [address] [place] [address]  [phone]               Nguyễn Dixon MD  04/30/23 5835

## 2023-05-03 LAB — POCT GLUCOSE: 201 MG/DL (ref 70–110)

## 2023-05-15 DIAGNOSIS — F32.A DEPRESSION, UNSPECIFIED DEPRESSION TYPE: ICD-10-CM

## 2023-05-15 RX ORDER — DULOXETIN HYDROCHLORIDE 30 MG/1
CAPSULE, DELAYED RELEASE ORAL
Qty: 30 CAPSULE | Refills: 1 | Status: SHIPPED | OUTPATIENT
Start: 2023-05-15 | End: 2023-06-13

## 2023-05-26 ENCOUNTER — TELEPHONE (OUTPATIENT)
Dept: FAMILY MEDICINE | Facility: CLINIC | Age: 23
End: 2023-05-26
Payer: MEDICAID

## 2023-05-26 DIAGNOSIS — L98.9 SKIN SORE: Primary | ICD-10-CM

## 2023-05-26 RX ORDER — SULFAMETHOXAZOLE AND TRIMETHOPRIM 800; 160 MG/1; MG/1
1 TABLET ORAL 2 TIMES DAILY
Qty: 14 TABLET | Refills: 0 | Status: SHIPPED | OUTPATIENT
Start: 2023-05-26 | End: 2023-06-13

## 2023-05-26 NOTE — TELEPHONE ENCOUNTER
Patient's parent called requesting refill on bactrim for skin sores. He stated she usually gets them sent in without having to come in. I informed him I would have to verify with Dr Whiting before anything can be sent in . He stated they were leaving for vacation and would need it today. After speaking with Dr Whiting he approved the prescription refill but said she needs an appt next time. This was relayed to the parent and he verbalized understanding.

## 2023-06-13 ENCOUNTER — OFFICE VISIT (OUTPATIENT)
Dept: FAMILY MEDICINE | Facility: CLINIC | Age: 23
End: 2023-06-13
Payer: MEDICAID

## 2023-06-13 VITALS
BODY MASS INDEX: 43.58 KG/M2 | RESPIRATION RATE: 20 BRPM | HEART RATE: 107 BPM | HEIGHT: 58 IN | DIASTOLIC BLOOD PRESSURE: 80 MMHG | SYSTOLIC BLOOD PRESSURE: 126 MMHG | WEIGHT: 207.63 LBS | TEMPERATURE: 97 F | OXYGEN SATURATION: 98 %

## 2023-06-13 DIAGNOSIS — F32.A DEPRESSION, UNSPECIFIED DEPRESSION TYPE: ICD-10-CM

## 2023-06-13 DIAGNOSIS — N61.0 MASTITIS: Primary | ICD-10-CM

## 2023-06-13 PROCEDURE — 1160F RVW MEDS BY RX/DR IN RCRD: CPT | Mod: CPTII,,, | Performed by: FAMILY MEDICINE

## 2023-06-13 PROCEDURE — 99214 PR OFFICE/OUTPT VISIT, EST, LEVL IV, 30-39 MIN: ICD-10-PCS | Mod: ,,, | Performed by: FAMILY MEDICINE

## 2023-06-13 PROCEDURE — 99214 OFFICE O/P EST MOD 30 MIN: CPT | Mod: ,,, | Performed by: FAMILY MEDICINE

## 2023-06-13 PROCEDURE — 3074F SYST BP LT 130 MM HG: CPT | Mod: CPTII,,, | Performed by: FAMILY MEDICINE

## 2023-06-13 PROCEDURE — 3079F PR MOST RECENT DIASTOLIC BLOOD PRESSURE 80-89 MM HG: ICD-10-PCS | Mod: CPTII,,, | Performed by: FAMILY MEDICINE

## 2023-06-13 PROCEDURE — 1160F PR REVIEW ALL MEDS BY PRESCRIBER/CLIN PHARMACIST DOCUMENTED: ICD-10-PCS | Mod: CPTII,,, | Performed by: FAMILY MEDICINE

## 2023-06-13 PROCEDURE — 1159F PR MEDICATION LIST DOCUMENTED IN MEDICAL RECORD: ICD-10-PCS | Mod: CPTII,,, | Performed by: FAMILY MEDICINE

## 2023-06-13 PROCEDURE — 3008F BODY MASS INDEX DOCD: CPT | Mod: CPTII,,, | Performed by: FAMILY MEDICINE

## 2023-06-13 PROCEDURE — 3079F DIAST BP 80-89 MM HG: CPT | Mod: CPTII,,, | Performed by: FAMILY MEDICINE

## 2023-06-13 PROCEDURE — 3008F PR BODY MASS INDEX (BMI) DOCUMENTED: ICD-10-PCS | Mod: CPTII,,, | Performed by: FAMILY MEDICINE

## 2023-06-13 PROCEDURE — 1159F MED LIST DOCD IN RCRD: CPT | Mod: CPTII,,, | Performed by: FAMILY MEDICINE

## 2023-06-13 PROCEDURE — 3074F PR MOST RECENT SYSTOLIC BLOOD PRESSURE < 130 MM HG: ICD-10-PCS | Mod: CPTII,,, | Performed by: FAMILY MEDICINE

## 2023-06-13 RX ORDER — DICLOXACILLIN SODIUM 500 MG/1
500 CAPSULE ORAL 4 TIMES DAILY
Qty: 28 CAPSULE | Refills: 0 | Status: SHIPPED | OUTPATIENT
Start: 2023-06-13 | End: 2023-07-03

## 2023-06-13 RX ORDER — MUPIROCIN 20 MG/G
OINTMENT TOPICAL 2 TIMES DAILY
Qty: 15 G | Refills: 1 | Status: SHIPPED | OUTPATIENT
Start: 2023-06-13 | End: 2023-10-09 | Stop reason: SDUPTHER

## 2023-06-13 RX ORDER — ARIPIPRAZOLE 2 MG/1
2 TABLET ORAL DAILY
Qty: 30 TABLET | Refills: 1 | Status: SHIPPED | OUTPATIENT
Start: 2023-06-13 | End: 2023-08-11

## 2023-06-13 NOTE — PROGRESS NOTES
"Subjective:       Patient ID: Arlin Daniels is a 23 y.o. female.    Chief Complaint: abcess under bilateral breast  (Finish antibiotics last week)      sores      Review of Systems   Constitutional: Negative.  Negative for chills and fever.   Respiratory: Negative.     Cardiovascular: Negative.    Integumentary:         Sores: located below breast, painful, no drainage   Psychiatric/Behavioral:          Depression:  Family reports medication no longer working as well, increased irritability         Objective:      /80 (BP Location: Left arm, Patient Position: Sitting, BP Method: Large (Manual))   Pulse 107   Temp 97.3 °F (36.3 °C)   Resp 20   Ht 4' 10" (1.473 m)   Wt 94.2 kg (207 lb 9.6 oz)   LMP 05/27/2023   SpO2 98%   BMI 43.39 kg/m²    Physical Exam  Constitutional:       Appearance: Normal appearance.   Cardiovascular:      Rate and Rhythm: Normal rate and regular rhythm.      Heart sounds: Normal heart sounds.   Pulmonary:      Effort: Pulmonary effort is normal.      Breath sounds: Normal breath sounds.   Skin:     Comments: Small sore noted on the bottom of left breast   Neurological:      Mental Status: She is alert.   Psychiatric:         Mood and Affect: Mood normal.         Behavior: Behavior normal.         Thought Content: Thought content normal.         Judgment: Judgment normal.             Assessment:       Problem List Items Addressed This Visit          Psychiatric    Depression    Relevant Medications    ARIPiprazole (ABILIFY) 2 MG Tab     Other Visit Diagnoses       Mastitis    -  Primary    Relevant Medications    dicloxacillin (DYNAPEN) 500 MG capsule    mupirocin (BACTROBAN) 2 % ointment               Plan:   1. Mastitis  -     dicloxacillin (DYNAPEN) 500 MG capsule; Take 1 capsule (500 mg total) by mouth 4 (four) times daily. for 7 days  Dispense: 28 capsule; Refill: 0  -     mupirocin (BACTROBAN) 2 % ointment; Apply topically 2 (two) times daily.  Dispense: 15 g; Refill: " 1  Rx of Bactroban topical   Rx for dicloxacillin 500 mg q.i.d. x1 week  Wound care discussed next item monitor   Return to clinic with any concerns     2. Depression, unspecified depression type  -     ARIPiprazole (ABILIFY) 2 MG Tab; Take 1 tablet (2 mg total) by mouth once daily.  Dispense: 30 tablet; Refill: 1  Continue Cymbalta 60 mg q.day  Add Abilify 2 mg q.day   Monitor  Return to clinic with any concerns

## 2023-06-26 ENCOUNTER — PATIENT MESSAGE (OUTPATIENT)
Dept: ADMINISTRATIVE | Facility: HOSPITAL | Age: 23
End: 2023-06-26
Payer: MEDICAID

## 2023-06-29 ENCOUNTER — OFFICE VISIT (OUTPATIENT)
Dept: FAMILY MEDICINE | Facility: CLINIC | Age: 23
End: 2023-06-29
Payer: MEDICAID

## 2023-06-29 VITALS
BODY MASS INDEX: 44.12 KG/M2 | TEMPERATURE: 98 F | DIASTOLIC BLOOD PRESSURE: 76 MMHG | OXYGEN SATURATION: 100 % | HEIGHT: 58 IN | SYSTOLIC BLOOD PRESSURE: 112 MMHG | RESPIRATION RATE: 16 BRPM | WEIGHT: 210.19 LBS | HEART RATE: 82 BPM

## 2023-06-29 DIAGNOSIS — Z00.00 WELLNESS EXAMINATION: Primary | ICD-10-CM

## 2023-06-29 PROCEDURE — 3008F PR BODY MASS INDEX (BMI) DOCUMENTED: ICD-10-PCS | Mod: CPTII,,, | Performed by: FAMILY MEDICINE

## 2023-06-29 PROCEDURE — 99395 PREV VISIT EST AGE 18-39: CPT | Mod: ,,, | Performed by: FAMILY MEDICINE

## 2023-06-29 PROCEDURE — 1160F PR REVIEW ALL MEDS BY PRESCRIBER/CLIN PHARMACIST DOCUMENTED: ICD-10-PCS | Mod: CPTII,,, | Performed by: FAMILY MEDICINE

## 2023-06-29 PROCEDURE — 3074F PR MOST RECENT SYSTOLIC BLOOD PRESSURE < 130 MM HG: ICD-10-PCS | Mod: CPTII,,, | Performed by: FAMILY MEDICINE

## 2023-06-29 PROCEDURE — 1159F PR MEDICATION LIST DOCUMENTED IN MEDICAL RECORD: ICD-10-PCS | Mod: CPTII,,, | Performed by: FAMILY MEDICINE

## 2023-06-29 PROCEDURE — 3078F DIAST BP <80 MM HG: CPT | Mod: CPTII,,, | Performed by: FAMILY MEDICINE

## 2023-06-29 PROCEDURE — 1160F RVW MEDS BY RX/DR IN RCRD: CPT | Mod: CPTII,,, | Performed by: FAMILY MEDICINE

## 2023-06-29 PROCEDURE — 1159F MED LIST DOCD IN RCRD: CPT | Mod: CPTII,,, | Performed by: FAMILY MEDICINE

## 2023-06-29 PROCEDURE — 3008F BODY MASS INDEX DOCD: CPT | Mod: CPTII,,, | Performed by: FAMILY MEDICINE

## 2023-06-29 PROCEDURE — 99395 PR PREVENTIVE VISIT,EST,18-39: ICD-10-PCS | Mod: ,,, | Performed by: FAMILY MEDICINE

## 2023-06-29 PROCEDURE — 3078F PR MOST RECENT DIASTOLIC BLOOD PRESSURE < 80 MM HG: ICD-10-PCS | Mod: CPTII,,, | Performed by: FAMILY MEDICINE

## 2023-06-29 PROCEDURE — 3074F SYST BP LT 130 MM HG: CPT | Mod: CPTII,,, | Performed by: FAMILY MEDICINE

## 2023-06-29 NOTE — PROGRESS NOTES
"Subjective:       Patient ID: Arlin Daniels is a 23 y.o. female.    Chief Complaint: Annual Exam      Wellness    Diabetes  - tolerating medication (no side-effects)  - DXT: 130's  - watching diet  - followed by endocrinology, last HgA1c = 6.6    Review of Systems   Constitutional: Negative.    Respiratory: Negative.     Cardiovascular: Negative.    Gastrointestinal: Negative.    Genitourinary:         Recent gyn visit with Dr. Aguirre   Psychiatric/Behavioral: Negative.          Depression: improved with medication         Objective:      /76 (BP Location: Right arm, Patient Position: Sitting)   Pulse 82   Temp 97.7 °F (36.5 °C) (Temporal)   Resp 16   Ht 4' 10" (1.473 m)   Wt 95.3 kg (210 lb 3.2 oz)   LMP 05/27/2023   SpO2 100%   BMI 43.93 kg/m²    Physical Exam  Constitutional:       Appearance: Normal appearance.   Cardiovascular:      Rate and Rhythm: Normal rate and regular rhythm.   Pulmonary:      Effort: Pulmonary effort is normal.      Breath sounds: Normal breath sounds.   Abdominal:      General: Abdomen is flat. Bowel sounds are normal.      Palpations: Abdomen is soft.   Musculoskeletal:         General: Normal range of motion.   Neurological:      Mental Status: She is alert.   Psychiatric:         Mood and Affect: Mood normal.         Behavior: Behavior normal.         Thought Content: Thought content normal.         Judgment: Judgment normal.           Recent Results (from the past 2688 hour(s))   POCT glucose    Collection Time: 04/30/23 12:38 PM   Result Value Ref Range    POCT Glucose 201 (H) 70 - 110 mg/dL   Comprehensive metabolic panel    Collection Time: 04/30/23 12:42 PM   Result Value Ref Range    Sodium Level 138 136 - 145 mmol/L    Potassium Level 4.8 3.5 - 5.1 mmol/L    Chloride 105 98 - 107 mmol/L    Carbon Dioxide 21 (L) 22 - 29 mmol/L    Glucose Level 228 (H) 74 - 100 mg/dL    Blood Urea Nitrogen 18.0 7.0 - 18.7 mg/dL    Creatinine 1.08 (H) 0.55 - 1.02 mg/dL    " Calcium Level Total 10.0 8.4 - 10.2 mg/dL    Protein Total 8.0 6.4 - 8.3 gm/dL    Albumin Level 3.9 3.5 - 5.0 g/dL    Globulin 4.1 (H) 2.4 - 3.5 gm/dL    Albumin/Globulin Ratio 1.0 (L) 1.1 - 2.0 ratio    Bilirubin Total 0.9 <=1.5 mg/dL    Alkaline Phosphatase 78 40 - 150 unit/L    Alanine Aminotransferase 16 0 - 55 unit/L    Aspartate Aminotransferase 19 5 - 34 unit/L    eGFR >60 mls/min/1.73/m2   CBC with Differential    Collection Time: 04/30/23 12:42 PM   Result Value Ref Range    WBC 10.7 4.5 - 11.5 x10(3)/mcL    RBC 4.63 4.20 - 5.40 x10(6)/mcL    Hgb 15.2 12.0 - 16.0 g/dL    Hct 44.6 37.0 - 47.0 %    MCV 96.3 (H) 80.0 - 94.0 fL    MCH 32.8 (H) 27.0 - 31.0 pg    MCHC 34.1 33.0 - 36.0 g/dL    RDW 13.2 11.5 - 17.0 %    Platelet 201 130 - 400 x10(3)/mcL    MPV 9.9 7.4 - 10.4 fL    NRBC% 0.0 %   Manual Differential    Collection Time: 04/30/23 12:42 PM   Result Value Ref Range    Neutrophils % 91 (H) 47 - 80 %    Bands % 5 0 - 11 %    Lymphs % 3 (L) 13 - 40 %    Monocytes % 1 (L) 2 - 11 %    Neutrophils Abs Calc 10.272 (H) 2.1 - 9.2 x10(3)/mcL    Monocytes Abs 0.107 0.1 - 1.3 x10(3)/mcL    Lymphs Abs 0.321 (L) 0.6 - 4.6 x10(3)/mcL    Platelets Adequate Normal, Adequate      Assessment:       Problem List Items Addressed This Visit    None  Visit Diagnoses       Wellness examination    -  Primary               Plan:   1. Wellness examination  Lab work discussed with patient  Continue current medication  Continue diet/exercise  Return to clinic with any concerns

## 2023-07-21 DIAGNOSIS — F32.A DEPRESSION, UNSPECIFIED DEPRESSION TYPE: ICD-10-CM

## 2023-07-21 RX ORDER — DULOXETIN HYDROCHLORIDE 60 MG/1
CAPSULE, DELAYED RELEASE ORAL
Qty: 30 CAPSULE | Refills: 3 | Status: SHIPPED | OUTPATIENT
Start: 2023-07-21 | End: 2023-11-07

## 2023-08-11 DIAGNOSIS — F32.A DEPRESSION, UNSPECIFIED DEPRESSION TYPE: ICD-10-CM

## 2023-08-11 RX ORDER — ARIPIPRAZOLE 2 MG/1
2 TABLET ORAL
Qty: 30 TABLET | Refills: 1 | Status: SHIPPED | OUTPATIENT
Start: 2023-08-11 | End: 2023-09-05

## 2023-08-21 ENCOUNTER — HOSPITAL ENCOUNTER (OUTPATIENT)
Dept: RADIOLOGY | Facility: HOSPITAL | Age: 23
Discharge: HOME | End: 2023-08-21
Attending: FAMILY MEDICINE
Payer: MEDICAID

## 2023-08-21 ENCOUNTER — OFFICE VISIT (OUTPATIENT)
Dept: FAMILY MEDICINE | Facility: CLINIC | Age: 23
End: 2023-08-21
Payer: MEDICAID

## 2023-08-21 VITALS
RESPIRATION RATE: 20 BRPM | DIASTOLIC BLOOD PRESSURE: 78 MMHG | HEIGHT: 58 IN | BODY MASS INDEX: 46.18 KG/M2 | SYSTOLIC BLOOD PRESSURE: 118 MMHG | HEART RATE: 97 BPM | OXYGEN SATURATION: 98 % | TEMPERATURE: 99 F | WEIGHT: 220 LBS

## 2023-08-21 DIAGNOSIS — M54.50 ACUTE MIDLINE LOW BACK PAIN WITHOUT SCIATICA: ICD-10-CM

## 2023-08-21 DIAGNOSIS — N61.0 MASTITIS, LEFT, ACUTE: Primary | ICD-10-CM

## 2023-08-21 PROCEDURE — 1160F PR REVIEW ALL MEDS BY PRESCRIBER/CLIN PHARMACIST DOCUMENTED: ICD-10-PCS | Mod: CPTII,,, | Performed by: FAMILY MEDICINE

## 2023-08-21 PROCEDURE — 1159F MED LIST DOCD IN RCRD: CPT | Mod: CPTII,,, | Performed by: FAMILY MEDICINE

## 2023-08-21 PROCEDURE — 3078F DIAST BP <80 MM HG: CPT | Mod: CPTII,,, | Performed by: FAMILY MEDICINE

## 2023-08-21 PROCEDURE — 3078F PR MOST RECENT DIASTOLIC BLOOD PRESSURE < 80 MM HG: ICD-10-PCS | Mod: CPTII,,, | Performed by: FAMILY MEDICINE

## 2023-08-21 PROCEDURE — 1159F PR MEDICATION LIST DOCUMENTED IN MEDICAL RECORD: ICD-10-PCS | Mod: CPTII,,, | Performed by: FAMILY MEDICINE

## 2023-08-21 PROCEDURE — 3074F SYST BP LT 130 MM HG: CPT | Mod: CPTII,,, | Performed by: FAMILY MEDICINE

## 2023-08-21 PROCEDURE — 99214 OFFICE O/P EST MOD 30 MIN: CPT | Mod: ,,, | Performed by: FAMILY MEDICINE

## 2023-08-21 PROCEDURE — 72100 X-RAY EXAM L-S SPINE 2/3 VWS: CPT | Mod: TC

## 2023-08-21 PROCEDURE — 3008F BODY MASS INDEX DOCD: CPT | Mod: CPTII,,, | Performed by: FAMILY MEDICINE

## 2023-08-21 PROCEDURE — 3074F PR MOST RECENT SYSTOLIC BLOOD PRESSURE < 130 MM HG: ICD-10-PCS | Mod: CPTII,,, | Performed by: FAMILY MEDICINE

## 2023-08-21 PROCEDURE — 1160F RVW MEDS BY RX/DR IN RCRD: CPT | Mod: CPTII,,, | Performed by: FAMILY MEDICINE

## 2023-08-21 PROCEDURE — 99214 PR OFFICE/OUTPT VISIT, EST, LEVL IV, 30-39 MIN: ICD-10-PCS | Mod: ,,, | Performed by: FAMILY MEDICINE

## 2023-08-21 PROCEDURE — 3008F PR BODY MASS INDEX (BMI) DOCUMENTED: ICD-10-PCS | Mod: CPTII,,, | Performed by: FAMILY MEDICINE

## 2023-08-21 RX ORDER — CLINDAMYCIN HYDROCHLORIDE 150 MG/1
150 CAPSULE ORAL EVERY 6 HOURS
Qty: 28 CAPSULE | Refills: 0 | Status: SHIPPED | OUTPATIENT
Start: 2023-08-21 | End: 2023-08-28

## 2023-08-21 NOTE — PROGRESS NOTES
"Subjective:       Patient ID: Arlin Daniels is a 23 y.o. female.    Chief Complaint: Abscess (Under left breast)      Abscess    Abscess      Review of Systems   Constitutional: Negative.    Respiratory: Negative.     Cardiovascular: Negative.    Musculoskeletal:  Positive for back pain (Bilateral hip pain, pain radiates down legs, history of negative hip x-rays).   Integumentary:         Abscess:  Located on left breast, present times 4 days, using OTC topical, drainage initially           Objective:      /78 (BP Location: Left arm, Patient Position: Sitting, BP Method: Large (Manual))   Pulse 97   Temp 98.6 °F (37 °C)   Resp 20   Ht 4' 10" (1.473 m)   Wt 99.8 kg (220 lb)   LMP 07/20/2023 (Approximate)   SpO2 98%   BMI 45.98 kg/m²    Physical Exam  Constitutional:       Appearance: Normal appearance.   Cardiovascular:      Rate and Rhythm: Normal rate and regular rhythm.      Heart sounds: Normal heart sounds.   Pulmonary:      Effort: Pulmonary effort is normal.      Breath sounds: Normal breath sounds.   Musculoskeletal:         General: Normal range of motion.   Skin:     Comments: Left breasts: Abscess noted, mild erythema, no drainage   Neurological:      Mental Status: She is alert.   Psychiatric:         Mood and Affect: Mood normal.         Behavior: Behavior normal.         Thought Content: Thought content normal.         Judgment: Judgment normal.               Assessment:       Problem List Items Addressed This Visit    None  Visit Diagnoses       Mastitis, left, acute    -  Primary    Relevant Medications    clindamycin (CLEOCIN) 150 MG capsule    Acute midline low back pain without sciatica        Relevant Orders    X-Ray Lumbar Spine Ap And Lateral               Plan:   1. Mastitis, left, acute  -     clindamycin (CLEOCIN) 150 MG capsule; Take 1 capsule (150 mg total) by mouth every 6 (six) hours. for 7 days  Dispense: 28 capsule; Refill: 0  Rx for clindamycin 150 mg q.i.d. x7 days "   Wound care discussed   Monitor   Return to clinic with any concerns     2. Acute midline low back pain without sciatica  -     X-Ray Lumbar Spine Ap And Lateral; Future; Expected date: 08/21/2023  Schedule L-spine x-ray

## 2023-08-24 DIAGNOSIS — M54.16 LUMBAR RADICULOPATHY: Primary | ICD-10-CM

## 2023-08-29 ENCOUNTER — CLINICAL SUPPORT (OUTPATIENT)
Dept: REHABILITATION | Facility: HOSPITAL | Age: 23
End: 2023-08-29
Attending: FAMILY MEDICINE
Payer: MEDICAID

## 2023-08-29 DIAGNOSIS — M54.16 LUMBAR RADICULOPATHY: ICD-10-CM

## 2023-08-29 PROCEDURE — 97163 PT EVAL HIGH COMPLEX 45 MIN: CPT

## 2023-08-29 PROCEDURE — 97110 THERAPEUTIC EXERCISES: CPT

## 2023-08-29 NOTE — PROGRESS NOTES
[facility]  Physical Therapy Initial Evaluation    Name: Arlin Daniels  Clinic Number: [id number]    Therapy Diagnosis: No diagnosis found.  Physician: Ferny Whiting MD    Physician Orders: PT Eval and Treat  Medical Diagnosis from Referral: lumbar pain with radiculopathy  Evaluation Date: 8/29/2023  Authorization Period Expiration: medically necessary  Plan of Care Expiration: 11/29/2023  Visit # / Visits authorized: 0/ medically necessary    Time In: 315  Time Out: 400  Total Appointment Time (timed & untimed codes): 30 minutes  Total Treatment time (time-based codes) separate from Evaluation: 15 minutes    Pertinent History: h/o B hip and LBP w radiculopathy B LE, Down Syndrome    Subjective     Arlin is a 23 year old Down Syndrome patient reporting pain at low back and in both hips when she is trying to walk on the beach, walk in the stores,  sea shells or stand up to play her games.  Mom is present and says Arlin is unable to walk throughout [facility] without sitting for rest.  They report a significant weight gain of greater than 70# over the past year since she graduated from high school and become sedentary at home.    Imaging Lumbar X-rays 8/21/2023 with no significant findings per Dr Whiting      Pain level and location: 8/10 with Zuniga-Baker    Functional Limitations: standing to play games, bending over to  shells, walking on the beach, unable to walk in grocery stores  Goals: to not have pain and be able to walk in stores  Prior therapy/intervention: none      Medical History:   Past Medical History:   Diagnosis Date    Down syndrome     Heart valve insufficiency     Hypothyroidism        Surgical History:   Arlin Daniels  has a past surgical history that includes Tumor removal.    Medications:   Arlin has a current medication list which includes the following prescription(s): ketone urine test, albuterol, aripiprazole, baqsimi, blood sugar diagnostic,  dexcom g6 , dexcom g6 sensor, dexcom g6 transmitter, dicyclomine, duloxetine, fluticasone propionate, humalog kwikpen insulin, insulin glargine, ketostix, lancets, levothyroxine, loratadine, mupirocin, omnipod 5 g6 pods (gen 5), ondansetron, onetouch verio test strips, and pen needle, diabetic.    Allergies:   Review of patient's allergies indicates:  No Known Allergies       Prior Level of Function: able to walk as much as she wants  Current Level of Function: unable to tolerate walking in stores with mom      CMS Impairment/Limitation/Restriction for FOTO Survey  Therapist reviewed FOTO scores for Arlin Daniels on 2023. FOTO documents entered into [employer] - see Media section.  Patient's Physical FS Primary Measure: 35  Risk Adjusted Statistical FOTO: 43  Limitation Score: 65%  Category: Mobility  MDQ: 56% disability    Objective     Eval 2023   Pain    8/10 with faces pain chart   Posture: patient has increased lumbar lordosis and obese abdomen    Gait Analysis: slight antalgic gait pattern at right LE, knee recurvatum preference     Myotomes    HIP FLX - Right 3/5, Left 4/5  HIP EXT - Right 3/5, Left 4/5    HIP ABD - Right 3/5, Left 4/5    KNEE FLX - Right 3/5, Left 4/5  KNEE EXT - Right 3/5, Left 4/5  ANKLE DF - Right 3/5, Left 4/5         Flexibility    HS = 60 B  Piriformis = min restr B       AROM    Normal range of motion at hips    Flexion: 100 B hips  Extension: 25 B hips           Special Tests    Jonatan Test: + B  90/90 Hamstrin B  Piriformis Test: min restr B       Functional Testing    5x STS = 20 sec    Balance    Tandem R forward = 3 sec w max sway  Tandem L forward = 3 sec w max sway  SLS R = unable  SLS L = unable    TUG = 10 sec  TUG w water carry = 12 sec    2 MWT = 256'            TREATMENT     Arlin received the treatments listed below:       Time Activities   Manual     TherAct     TherEx 15 min Walking and HEP   Gait     Neuro Re-ed     Modalities     E-Stim     Dry  Needling     Canalith Repositioning         Home Exercises and Patient Education Provided    Education provided:   -Plan of care, HEP, walking frequently during the day    Written Home Exercises Provided:  no .  Exercises were reviewed and Arlin was able to demonstrate them prior to the end of the session.  Arlin demonstrated good  understanding of the education provided.       Assessment   Arlin is a 23 y.o. female referred to outpatient Physical Therapy with a medical diagnosis of lumbar pain with radiculopathy B LE's. She complains mostly of B hip pain.    Patient presents with typical low tone Down Syndrome presentation and hypermobile joints.  She has recent weight gain of >70# since graduating from high school and developing a very sedentary lifestyle at home.  She has pain at B hips and R knee.  She does have anterior sacral preference with decreased flexibility at hip flexors.  She has good MMT, but poor muscle endurance with quick fatigue during prolonged hold or functional activity. She has decreased balance scores and poor functional activity tolerance.    Patient will benefit from skilled outpatient Physical Therapy to address the deficits stated above, provide education, and to maximize patient's level of independence.     Patient prognosis is Good.     Plan of care discussed with patient: Yes  Patient's spiritual, cultural and educational needs considered and patient is agreeable to the plan of care and goals as stated below:     Anticipated Barriers for therapy: Down Syndrome    Goals:  Short Term Goals: 6 weeks   Patient will report at least 10% disability reduction from initial MDQ score to indicate clinically significant functional improvement  Patient will report at least 10 point increase on initial FOTO score to indicate clinically significant functional improvement  Patient will report 10% pain reduction    Long Term Goals: 12 weeks   Patient will report at least 20% disability reduction from  initial MDQ score to indicate clinically significant functional improvement  Patient will report at least 20% increase on initial FOTO score to indicate clinically significant functional improvement  Patient will report 50% overall perceived improvement  Patient will report ability to walk in grocery store without pain at B hips  Patient will demonstrate independence and compliance with HEP    Plan   Plan of care Certification: 8/29/2023 to 11/29/2023.    Outpatient Physical Therapy 2 times weekly for 12 weeks to include the following interventions: Cervical/Lumbar Traction, Gait Training, Manual Therapy, Moist Heat/ Ice, Neuromuscular Re-ed, Patient Education, Self Care, Therapeutic Activities, and Therapeutic Exercise.     Toma Dave, PT

## 2023-08-31 ENCOUNTER — CLINICAL SUPPORT (OUTPATIENT)
Dept: REHABILITATION | Facility: HOSPITAL | Age: 23
End: 2023-08-31
Payer: MEDICAID

## 2023-08-31 DIAGNOSIS — M54.16 LUMBAR RADICULOPATHY: Primary | ICD-10-CM

## 2023-08-31 PROCEDURE — 97110 THERAPEUTIC EXERCISES: CPT

## 2023-08-31 PROCEDURE — 97530 THERAPEUTIC ACTIVITIES: CPT

## 2023-09-01 ENCOUNTER — HOSPITAL ENCOUNTER (OUTPATIENT)
Dept: RADIOLOGY | Facility: HOSPITAL | Age: 23
Discharge: HOME | End: 2023-09-01
Attending: FAMILY MEDICINE
Payer: MEDICAID

## 2023-09-01 DIAGNOSIS — M54.10 RADICULOPATHY: Primary | ICD-10-CM

## 2023-09-01 DIAGNOSIS — M54.16 LUMBAR RADICULOPATHY: ICD-10-CM

## 2023-09-01 PROCEDURE — 72148 MRI LUMBAR SPINE W/O DYE: CPT | Mod: TC

## 2023-09-01 NOTE — PLAN OF CARE
Physical Therapy Treatment Note     Name: Arlin Daniels  Clinic Number: [id number]    Therapy Diagnosis:   Encounter Diagnosis   Name Primary?    Lumbar radiculopathy Yes     Physician: Ferny Whiting MD    Visit Date: 2023    Physician Orders: PT Eval and Treat  Medical Diagnosis from Referral: lumbar pain with radiculopathy  Evaluation Date: 2023  Authorization Period Expiration: medically necessary  Plan of Care Expiration: 2023  Visit # / Visits authorized: 0/ medically necessary     Time In: 255  Time Out: 335  Total Billable Time: 40 minutes    Pertinent History: h/o B hip and LBP w radiculopathy B LE, Down Syndrome    Subjective     Patient reports: she is not really wanting to do therapy today.  She presents slightly agitated and parents say this is a normal reaction for her.    CMS Impairment/Limitation/Restriction for FOTO Survey  Therapist reviewed FOTO scores for Arlin Daniels on 2023. FOTO documents entered into [employer] - see Media section.  Patient's Physical FS Primary Measure: 35  Risk Adjusted Statistical FOTO: 43  Limitation Score: 65%  Category: Mobility  MDQ: 56% disability    Objective     Eval 2023   Pain     8/10 with faces pain chart   Posture: patient has increased lumbar lordosis and obese abdomen     Gait Analysis: slight antalgic gait pattern at right LE, knee recurvatum preference      Myotomes     HIP FLX - Right 3/5, Left 4/5  HIP EXT - Right 3/5, Left 4/5     HIP ABD - Right 3/5, Left 4/5     KNEE FLX - Right 3/5, Left 4/5  KNEE EXT - Right 3/5, Left 4/5  ANKLE DF - Right 3/5, Left 4/5            Flexibility     HS = 60 B  Piriformis = min restr B         AROM     Normal range of motion at hips     Flexion: 100 B hips  Extension: 25 B hips               Special Tests     Jonatan Test: + B  90/90 Hamstrin B  Piriformis Test: min restr B         Functional Testing     5x STS = 20 sec     Balance     Tandem R forward = 3 sec w max sway  Tandem L forward  = 3 sec w max sway  SLS R = unable  SLS L = unable     TUG = 10 sec  TUG w water carry = 12 sec     2 MWT = 256'     Arlin received the following treatment:     Time Activities   Manual min    TherAct 30 min Swiss ball core focus seated activity, foam balance isometric recruitment activity   TherEx 10 min Core and LE and hip girdle strengthening   Gait     Neuro Re-ed     Modalities     E-Stim     Dry Needling     Canalith Repositioning           Home Exercises Provided and Patient Education Provided     Education provided:   -Plan of care, HEP, walking    Assessment     Exercise focus on functional core, low back and hip girdle recruitment and activity tolerance due to cognitive limitations.  Patient was guarded and hesitant at start of therapy, but was able to tolerate good session with coaxing and play.    Patient reports no pain at end of session.    Feel patient will benefit from therapy for progressive core and hip girdle and LE strengthening and improved activity tolerance.    Patient prognosis is Good.      Anticipated barriers to physical therapy: cognitive limitations with behavioral dysfunction at times.    Goals: Arlin Is progressing well towards her goals.  Short Term Goals: 6 weeks   Patient will report at least 10% disability reduction from initial MDQ score to indicate clinically significant functional improvement  Patient will report at least 10 point increase on initial FOTO score to indicate clinically significant functional improvement  Patient will report 10% pain reduction     Long Term Goals: 12 weeks   Patient will report at least 20% disability reduction from initial MDQ score to indicate clinically significant functional improvement  Patient will report at least 20% increase on initial FOTO score to indicate clinically significant functional improvement  Patient will report 50% overall perceived improvement  Patient will report ability to walk in grocery store without pain at B  hips  Patient will demonstrate independence and compliance with HEP      Plan     2-3x/week x 12 weeks    Toma Dave, PT

## 2023-09-05 ENCOUNTER — CLINICAL SUPPORT (OUTPATIENT)
Dept: REHABILITATION | Facility: HOSPITAL | Age: 23
End: 2023-09-05
Payer: MEDICAID

## 2023-09-05 DIAGNOSIS — M54.16 LUMBAR RADICULOPATHY: Primary | ICD-10-CM

## 2023-09-05 DIAGNOSIS — T78.40XD ALLERGY, SUBSEQUENT ENCOUNTER: ICD-10-CM

## 2023-09-05 DIAGNOSIS — F32.A DEPRESSION, UNSPECIFIED DEPRESSION TYPE: ICD-10-CM

## 2023-09-05 PROCEDURE — 97110 THERAPEUTIC EXERCISES: CPT

## 2023-09-05 PROCEDURE — 97530 THERAPEUTIC ACTIVITIES: CPT

## 2023-09-05 RX ORDER — ARIPIPRAZOLE 2 MG/1
2 TABLET ORAL
Qty: 30 TABLET | Refills: 1 | Status: SHIPPED | OUTPATIENT
Start: 2023-09-05 | End: 2023-11-07

## 2023-09-05 RX ORDER — LORATADINE 10 MG/1
TABLET ORAL
Qty: 30 TABLET | Refills: 3 | Status: SHIPPED | OUTPATIENT
Start: 2023-09-05

## 2023-09-05 NOTE — PLAN OF CARE
Physical Therapy Treatment Note     Name: Arlin Daniels  Clinic Number: [id number]    Therapy Diagnosis:   Encounter Diagnosis   Name Primary?    Lumbar radiculopathy Yes     Physician: Ferny Whiting MD    Visit Date: 2023    Physician Orders: PT Eval and Treat  Medical Diagnosis from Referral: lumbar pain with radiculopathy  Evaluation Date: 2023  Authorization Period Expiration: medically necessary  Plan of Care Expiration: 2023  Visit # / Visits authorized: 2/ medically necessary     Time In: 300  Time Out: 340  Total Billable Time: 40 minutes    Pertinent History: h/o B hip and LBP w radiculopathy B LE, Down Syndrome    Subjective     Patient reports: she walked on the beach this weekend and is having a little back pain today    CMS Impairment/Limitation/Restriction for FOTO Survey  Therapist reviewed FOTO scores for Arlin Daniels on 2023. FOTO documents entered into [employer] - see Media section.  Patient's Physical FS Primary Measure: 35  Risk Adjusted Statistical FOTO: 43  Limitation Score: 65%  Category: Mobility  MDQ: 56% disability    Objective     Eval 2023   Pain     8/10 with faces pain chart   Posture: patient has increased lumbar lordosis and obese abdomen     Gait Analysis: slight antalgic gait pattern at right LE, knee recurvatum preference      Myotomes     HIP FLX - Right 3/5, Left 4/5  HIP EXT - Right 3/5, Left 4/5     HIP ABD - Right 3/5, Left 4/5     KNEE FLX - Right 3/5, Left 4/5  KNEE EXT - Right 3/5, Left 4/5  ANKLE DF - Right 3/5, Left 4/5            Flexibility     HS = 60 B  Piriformis = min restr B         AROM     Normal range of motion at hips     Flexion: 100 B hips  Extension: 25 B hips               Special Tests     Jonatan Test: + B  90/90 Hamstrin B  Piriformis Test: min restr B         Functional Testing     5x STS = 20 sec     Balance     Tandem R forward = 3 sec w max sway  Tandem L forward = 3 sec w max sway  SLS R = unable  SLS L = unable      TUG = 10 sec  TUG w water carry = 12 sec     2 MWT = 256'     Arlin received the following treatment:     Time Activities   Manual min    TherAct 30 min Swiss ball core focus seated activity, foam balance isometric recruitment activity   TherEx 10 min Core and LE and hip girdle strengthening   Gait     Neuro Re-ed     Modalities     E-Stim     Dry Needling     Canalith Repositioning           Home Exercises Provided and Patient Education Provided     Education provided:   -Plan of care, HEP, walking    Assessment     Exercise focus on functional core, low back and hip girdle recruitment and activity tolerance.  Patient participated well and was able to tolerate increased core recruitment exercise.  She appears to have increased pain complaints with flexion stress and has moderate difficulty with core recruitment, but is able to complete all exercises.    Patient reports persistent low back pain at end of session.    Feel patient will benefit from therapy for progressive core and hip girdle and LE strengthening and improved activity tolerance.    Patient prognosis is Good.      Anticipated barriers to physical therapy: cognitive limitations with behavioral dysfunction at times.    Goals: Arlin Is progressing well towards her goals.  Short Term Goals: 6 weeks   Patient will report at least 10% disability reduction from initial MDQ score to indicate clinically significant functional improvement  Patient will report at least 10 point increase on initial FOTO score to indicate clinically significant functional improvement  Patient will report 10% pain reduction     Long Term Goals: 12 weeks   Patient will report at least 20% disability reduction from initial MDQ score to indicate clinically significant functional improvement  Patient will report at least 20% increase on initial FOTO score to indicate clinically significant functional improvement  Patient will report 50% overall perceived improvement  Patient will  report ability to walk in grocery store without pain at B hips  Patient will demonstrate independence and compliance with HEP      Plan     2-3x/week x 12 weeks    Toma Dave, PT

## 2023-09-07 ENCOUNTER — CLINICAL SUPPORT (OUTPATIENT)
Dept: REHABILITATION | Facility: HOSPITAL | Age: 23
End: 2023-09-07
Payer: MEDICAID

## 2023-09-07 DIAGNOSIS — M54.16 LUMBAR RADICULOPATHY: Primary | ICD-10-CM

## 2023-09-07 PROCEDURE — 97530 THERAPEUTIC ACTIVITIES: CPT

## 2023-09-07 PROCEDURE — 97110 THERAPEUTIC EXERCISES: CPT

## 2023-09-07 NOTE — PLAN OF CARE
"  Physical Therapy Treatment Note     Name: Arlin Daniels  Clinic Number: [id number]    Therapy Diagnosis:   Encounter Diagnosis   Name Primary?    Lumbar radiculopathy Yes     Physician: Ferny Whiting MD    Visit Date: 2023    Physician Orders: PT Eval and Treat  Medical Diagnosis from Referral: lumbar pain with radiculopathy  Evaluation Date: 2023  Authorization Period Expiration: medically necessary  Plan of Care Expiration: 2023  Visit # / Visits authorized: 3/ medically necessary     Time In: 146  Time Out: 230  Total Billable Time: 44 minutes    Pertinent History: h/o B hip and LBP w radiculopathy B LE, Down Syndrome    Subjective     Patient reports: she has MRI results that say "facet arthropathy" and mother is worried    CMS Impairment/Limitation/Restriction for FOTO Survey  Therapist reviewed FOTO scores for Arlin Daniels on 2023. FOTO documents entered into [employer] - see Media section.  Patient's Physical FS Primary Measure: 35  Risk Adjusted Statistical FOTO: 43  Limitation Score: 65%  Category: Mobility  MDQ: 56% disability    Objective     Eval 2023   Pain     8/10 with faces pain chart   Posture: patient has increased lumbar lordosis and obese abdomen     Gait Analysis: slight antalgic gait pattern at right LE, knee recurvatum preference      Myotomes     HIP FLX - Right 3/5, Left 4/5  HIP EXT - Right 3/5, Left 4/5     HIP ABD - Right 3/5, Left 4/5     KNEE FLX - Right 3/5, Left 4/5  KNEE EXT - Right 3/5, Left 4/5  ANKLE DF - Right 3/5, Left 4/5            Flexibility     HS = 60 B  Piriformis = min restr B         AROM     Normal range of motion at hips     Flexion: 100 B hips  Extension: 25 B hips               Special Tests     Jonatan Test: + B  90/90 Hamstrin B  Piriformis Test: min restr B         Functional Testing     5x STS = 20 sec     Balance     Tandem R forward = 3 sec w max sway  Tandem L forward = 3 sec w max sway  SLS R = unable  SLS L = unable     TUG " = 10 sec  TUG w water carry = 12 sec     2 MWT = 256'     Arlin received the following treatment:     Time Activities   Manual min    TherAct 30 min Swiss ball core focus seated activity, foam balance isometric recruitment activity   TherEx 14 min Core and LE and hip girdle strengthening   Gait     Neuro Re-ed     Modalities     E-Stim     Dry Needling     Canalith Repositioning           Home Exercises Provided and Patient Education Provided     Education provided:   -Plan of care, HEP, walking    Assessment     Exercise focus on functional core, low back and hip girdle recruitment and activity tolerance.  Patient participated well and was able to tolerate increased core recruitment exercise.  Was able to tolerate increased balance activity times with increased ease and decreased loss of balance.  She tolerated increased lumbar ROM with stretching and exercise.    Great session.  Patient reports decreased pain levels at end of session.    Feel patient will benefit from therapy for progressive core and hip girdle and LE strengthening and improved activity tolerance.    Patient prognosis is Good.      Anticipated barriers to physical therapy: cognitive limitations with behavioral dysfunction at times.    Goals: Arlin Is progressing well towards her goals.  Short Term Goals: 6 weeks   Patient will report at least 10% disability reduction from initial MDQ score to indicate clinically significant functional improvement  Patient will report at least 10 point increase on initial FOTO score to indicate clinically significant functional improvement  Patient will report 10% pain reduction     Long Term Goals: 12 weeks   Patient will report at least 20% disability reduction from initial MDQ score to indicate clinically significant functional improvement  Patient will report at least 20% increase on initial FOTO score to indicate clinically significant functional improvement  Patient will report 50% overall perceived  improvement  Patient will report ability to walk in grocery store without pain at B hips  Patient will demonstrate independence and compliance with HEP      Plan     2-3x/week x 12 weeks    Toma Dave, PT

## 2023-09-12 ENCOUNTER — CLINICAL SUPPORT (OUTPATIENT)
Dept: REHABILITATION | Facility: HOSPITAL | Age: 23
End: 2023-09-12
Payer: MEDICAID

## 2023-09-12 DIAGNOSIS — M54.16 LUMBAR RADICULOPATHY: Primary | ICD-10-CM

## 2023-09-12 PROCEDURE — 97530 THERAPEUTIC ACTIVITIES: CPT

## 2023-09-12 PROCEDURE — 97110 THERAPEUTIC EXERCISES: CPT

## 2023-09-12 NOTE — PLAN OF CARE
Physical Therapy Treatment Note     Name: Arlin Daniels  Clinic Number: [id number]    Therapy Diagnosis:   Encounter Diagnosis   Name Primary?    Lumbar radiculopathy Yes     Physician: Ferny Whiting MD    Visit Date: 2023    Physician Orders: PT Eval and Treat  Medical Diagnosis from Referral: lumbar pain with radiculopathy  Evaluation Date: 2023  Authorization Period Expiration: medically necessary  Plan of Care Expiration: 2023  Visit # / Visits authorized: 4/ medically necessary     Time In: 308  Time Out: 346  Total Billable Time: 35 minutes    Pertinent History: h/o B hip and LBP w radiculopathy B LE, Down Syndrome    Subjective     Patient reports: her back was hurting yesterday while shopping at [employer].  Mom says they have not been walking at home.    CMS Impairment/Limitation/Restriction for FOTO Survey  Therapist reviewed FOTO scores for Arlin Daniels on 2023. FOTO documents entered into [employer] - see Media section.  Patient's Physical FS Primary Measure: 35  Risk Adjusted Statistical FOTO: 43  Limitation Score: 65%  Category: Mobility  MDQ: 56% disability    Objective     Eval 2023   Pain     8/10 with faces pain chart   Posture: patient has increased lumbar lordosis and obese abdomen     Gait Analysis: slight antalgic gait pattern at right LE, knee recurvatum preference      Myotomes     HIP FLX - Right 3/5, Left 4/5  HIP EXT - Right 3/5, Left 4/5     HIP ABD - Right 3/5, Left 4/5     KNEE FLX - Right 3/5, Left 4/5  KNEE EXT - Right 3/5, Left 4/5  ANKLE DF - Right 3/5, Left 4/5            Flexibility     HS = 60 B  Piriformis = min restr B         AROM     Normal range of motion at hips     Flexion: 100 B hips  Extension: 25 B hips               Special Tests     Jonatan Test: + B  90/90 Hamstrin B  Piriformis Test: min restr B         Functional Testing     5x STS = 20 sec     Balance     Tandem R forward = 3 sec w max sway  Tandem L forward = 3 sec w max sway  SLS R =  unable  SLS L = unable     TUG = 10 sec  TUG w water carry = 12 sec     2 MWT = 256'     Arlin received the following treatment:     Time Activities   Manual min    TherAct 24 min Swiss ball core focus seated activity, foam balance isometric recruitment activity, education   TherEx 14 min Core and LE and hip girdle strengthening   Gait     Neuro Re-ed     Modalities     E-Stim     Dry Needling     Canalith Repositioning           Home Exercises Provided and Patient Education Provided     Education provided:   -Plan of care, HEP, walking    Assessment     Exercise focus on functional core, low back and hip girdle recruitment and activity tolerance.  Patient participated well and was able to tolerate increased core recruitment exercise.  Was able to tolerate increased balance activity times with increased ease and decreased loss of balance.  She tolerated increased lumbar ROM with stretching and exercise.    Patient reports pain throughout session at low back.  Discussion with parents regarding importance of walking and increased activity at home, as patient continues to gain weight since decreased schedule and activity as school has finished.    Discussed ways to slowly increase walking while respecting patient's limitations. Patient and family receptive.    Feel patient will benefit from therapy for progressive core and hip girdle and LE strengthening and improved activity tolerance.    Patient prognosis is Good.      Anticipated barriers to physical therapy: cognitive limitations with behavioral dysfunction at times.    Goals: Arlin Is progressing well towards her goals.  Short Term Goals: 6 weeks   Patient will report at least 10% disability reduction from initial MDQ score to indicate clinically significant functional improvement  Patient will report at least 10 point increase on initial FOTO score to indicate clinically significant functional improvement  Patient will report 10% pain reduction     Long Term  Goals: 12 weeks   Patient will report at least 20% disability reduction from initial MDQ score to indicate clinically significant functional improvement  Patient will report at least 20% increase on initial FOTO score to indicate clinically significant functional improvement  Patient will report 50% overall perceived improvement  Patient will report ability to walk in grocery store without pain at B hips  Patient will demonstrate independence and compliance with HEP      Plan     2-3x/week x 12 weeks    Toma Dave, PT

## 2023-09-14 ENCOUNTER — CLINICAL SUPPORT (OUTPATIENT)
Dept: REHABILITATION | Facility: HOSPITAL | Age: 23
End: 2023-09-14
Payer: MEDICAID

## 2023-09-14 DIAGNOSIS — M54.16 LUMBAR RADICULOPATHY: Primary | ICD-10-CM

## 2023-09-14 PROCEDURE — 97530 THERAPEUTIC ACTIVITIES: CPT

## 2023-09-14 PROCEDURE — 97110 THERAPEUTIC EXERCISES: CPT

## 2023-09-14 NOTE — PLAN OF CARE
Physical Therapy Treatment Note     Name: Arlin Daniels  Clinic Number: [id number]    Therapy Diagnosis:   Encounter Diagnosis   Name Primary?    Lumbar radiculopathy Yes     Physician: Ferny Whiting MD    Visit Date: 2023    Physician Orders: PT Eval and Treat  Medical Diagnosis from Referral: lumbar pain with radiculopathy  Evaluation Date: 2023  Authorization Period Expiration: medically necessary  Plan of Care Expiration: 2023  Visit # / Visits authorized: 5/medically necessary     Time In: 250  Time Out: 330  Total Billable Time: 40 minutes    Pertinent History: h/o B hip and LBP w radiculopathy B LE, Down Syndrome    Subjective     Patient reports: she was able to walk one day, but only for about 5-6 minutes.    CMS Impairment/Limitation/Restriction for FOTO Survey  Therapist reviewed FOTO scores for Arlin Daniels on 2023. FOTO documents entered into [employer] - see Media section.  Patient's Physical FS Primary Measure: 35  Risk Adjusted Statistical FOTO: 43  Limitation Score: 65%  Category: Mobility  MDQ: 56% disability    Objective     Eval 2023   Pain     8/10 with faces pain chart   Posture: patient has increased lumbar lordosis and obese abdomen     Gait Analysis: slight antalgic gait pattern at right LE, knee recurvatum preference      Myotomes     HIP FLX - Right 3/5, Left 4/5  HIP EXT - Right 3/5, Left 4/5     HIP ABD - Right 3/5, Left 4/5     KNEE FLX - Right 3/5, Left 4/5  KNEE EXT - Right 3/5, Left 4/5  ANKLE DF - Right 3/5, Left 4/5            Flexibility     HS = 60 B  Piriformis = min restr B         AROM     Normal range of motion at hips     Flexion: 100 B hips  Extension: 25 B hips               Special Tests     Jonatan Test: + B  90/90 Hamstrin B  Piriformis Test: min restr B         Functional Testing     5x STS = 20 sec     Balance     Tandem R forward = 3 sec w max sway  Tandem L forward = 3 sec w max sway  SLS R = unable  SLS L = unable     TUG = 10  sec  TUG w water carry = 12 sec     2 MWT = 256'     Arlin received the following treatment:     Time Activities   Manual min    TherAct 25 min Swiss ball core focus seated activity, foam balance isometric recruitment activity   TherEx 15 min Core and LE and hip girdle stretching and strengthening, NuStep   Gait     Neuro Re-ed     Modalities     E-Stim     Dry Needling     Canalith Repositioning           Home Exercises Provided and Patient Education Provided     Education provided:   -Plan of care, HEP, walking    Assessment     Exercise focus on functional core, low back and hip girdle recruitment and activity tolerance.  Patient participated well and was able to tolerate increased core recruitment exercise. Added weighted core in supine with good response. No pain reported throughout or at end of session.    Encouraged continued walking even if it's just for a few minutes at a time.    Discussed ways to slowly increase walking while respecting patient's limitations. Patient and family receptive.    Feel patient will benefit from therapy for progressive core and hip girdle and LE strengthening and improved activity tolerance.    Patient prognosis is Good.      Anticipated barriers to physical therapy: cognitive limitations with behavioral dysfunction at times.    Goals: Arlin Is progressing well towards her goals.  Short Term Goals: 6 weeks   Patient will report at least 10% disability reduction from initial MDQ score to indicate clinically significant functional improvement  Patient will report at least 10 point increase on initial FOTO score to indicate clinically significant functional improvement  Patient will report 10% pain reduction     Long Term Goals: 12 weeks   Patient will report at least 20% disability reduction from initial MDQ score to indicate clinically significant functional improvement  Patient will report at least 20% increase on initial FOTO score to indicate clinically significant functional  improvement  Patient will report 50% overall perceived improvement  Patient will report ability to walk in grocery store without pain at B hips  Patient will demonstrate independence and compliance with HEP      Plan     2-3x/week x 12 weeks    Toma Dave, PT

## 2023-09-19 ENCOUNTER — CLINICAL SUPPORT (OUTPATIENT)
Dept: REHABILITATION | Facility: HOSPITAL | Age: 23
End: 2023-09-19
Payer: MEDICAID

## 2023-09-19 DIAGNOSIS — M54.16 LUMBAR RADICULOPATHY: Primary | ICD-10-CM

## 2023-09-19 PROCEDURE — 97110 THERAPEUTIC EXERCISES: CPT

## 2023-09-19 PROCEDURE — 97530 THERAPEUTIC ACTIVITIES: CPT

## 2023-09-19 NOTE — PLAN OF CARE
Physical Therapy Treatment Note     Name: Arlin Daniels  Clinic Number: [id number]    Therapy Diagnosis:   Encounter Diagnosis   Name Primary?    Lumbar radiculopathy Yes     Physician: Ferny Whiting MD    Visit Date: 9/19/2023    Physician Orders: PT Eval and Treat  Medical Diagnosis from Referral: lumbar pain with radiculopathy  Evaluation Date: 8/29/2023  Authorization Period Expiration: medically necessary  Plan of Care Expiration: 11/29/2023  Visit # / Visits authorized: 6/medically necessary     Time In: 254  Time Out: 340  Total Billable Time: 46 minutes    Pertinent History: h/o B hip and LBP w radiculopathy B LE, Down Syndrome    Subjective     Patient reports: she has been walking about 5 minutes each day.  Her parents say they went to [facility] prior to therapy today and Arlin was able to walk the entire store without assistance and no pain complaints. Parents report patient looks forward to therapy and they are noticing an improvement in activity tolerance and less pain complaints.    CMS Impairment/Limitation/Restriction for FOTO Survey  Therapist reviewed FOTO scores for Arlin Daniels on 8/29/2023. FOTO documents entered into [employer] - see Media section.  Patient's Physical FS Primary Measure: 35 (43 on 9/19)  Risk Adjusted Statistical FOTO: 43  Limitation Score: 65% (57% on 9/19)  Category: Mobility  MDQ: 56% disability (44% on 9/19)    Objective     Eval 8/29/2023   Pain     8/10 with faces pain chart   Posture: patient has increased lumbar lordosis and obese abdomen     Gait Analysis: slight antalgic gait pattern at right LE, knee recurvatum preference      Myotomes     HIP FLX - Right 3/5, Left 4/5  HIP EXT - Right 3/5, Left 4/5     HIP ABD - Right 3/5, Left 4/5     KNEE FLX - Right 3/5, Left 4/5  KNEE EXT - Right 3/5, Left 4/5  ANKLE DF - Right 3/5, Left 4/5            Flexibility     HS = 60 B  Piriformis = min restr B         AROM     Normal range of motion at hips     Flexion: 100 B  hips  Extension: 25 B hips               Special Tests     Jonatan Test: + B  90/90 Hamstrin B  Piriformis Test: min restr B         Functional Testing     5x STS = 20 sec     Balance     Tandem R forward = 3 sec w max sway  Tandem L forward = 3 sec w max sway  SLS R = unable  SLS L = unable     TUG = 10 sec  TUG w water carry = 12 sec     2 MWT = 256'     Arlin received the following treatment:     Time Activities   Manual min    TherAct 31 min Swiss ball core focus seated activity, foam balance isometric recruitment activity   TherEx 15 min Core and LE and hip girdle stretching and strengthening, NuStep   Gait     Neuro Re-ed     Modalities     E-Stim     Dry Needling     Canalith Repositioning           Home Exercises Provided and Patient Education Provided     Education provided:   -Plan of care, HEP, walking    Assessment     Exercise focus on functional core, low back and hip girdle recruitment and activity tolerance.  Patient participated well and was able to tolerate increased core recruitment exercise with increased resistance. Continued weighted core in supine with good response. No pain reported throughout or at end of session.    Encouraged continued walking even if it's just for a few minutes at a time.    Discussed ways to slowly increase walking while respecting patient's limitations. Patient and family receptive.    FOTO improved.      Feel patient will benefit from therapy for progressive core and hip girdle and LE strengthening and improved activity tolerance.    Patient prognosis is Good.      Anticipated barriers to physical therapy: cognitive limitations with behavioral dysfunction at times.    Goals: Arlin Is progressing well towards her goals.  Short Term Goals: 6 weeks   Patient will report at least 10% disability reduction from initial MDQ score to indicate clinically significant functional improvement  Patient will report at least 10 point increase on initial FOTO score to indicate  clinically significant functional improvement  Patient will report 10% pain reduction     Long Term Goals: 12 weeks   Patient will report at least 20% disability reduction from initial MDQ score to indicate clinically significant functional improvement  Patient will report at least 20% increase on initial FOTO score to indicate clinically significant functional improvement  Patient will report 50% overall perceived improvement  Patient will report ability to walk in grocery store without pain at B hips  Patient will demonstrate independence and compliance with HEP      Plan     2-3x/week x 12 weeks    Toma Dave, PT

## 2023-09-26 ENCOUNTER — CLINICAL SUPPORT (OUTPATIENT)
Dept: REHABILITATION | Facility: HOSPITAL | Age: 23
End: 2023-09-26
Payer: MEDICAID

## 2023-09-26 DIAGNOSIS — M54.16 LUMBAR RADICULOPATHY: Primary | ICD-10-CM

## 2023-09-26 PROCEDURE — 97110 THERAPEUTIC EXERCISES: CPT

## 2023-09-26 NOTE — PLAN OF CARE
Physical Therapy Treatment Note     Name: Arlin Daniels  Clinic Number: [id number]    Therapy Diagnosis:   Encounter Diagnosis   Name Primary?    Lumbar radiculopathy Yes     Physician: Ferny Whiting MD    Visit Date: 2023    Physician Orders: PT Eval and Treat  Medical Diagnosis from Referral: lumbar pain with radiculopathy  Evaluation Date: 2023  Authorization Period Expiration: medically necessary  Plan of Care Expiration: 2023  Visit # / Visits authorized: 7/medically necessary     Time In: 300  Time Out: 340  Total Billable Time: 40 minutes    Pertinent History: h/o B hip and LBP w radiculopathy B LE, Down Syndrome    Subjective     Patient reports: she has been walking each day and her back is feeling better.    CMS Impairment/Limitation/Restriction for FOTO Survey  Therapist reviewed FOTO scores for Arlin Daniels on 2023. FOTO documents entered into [employer] - see Media section.  Patient's Physical FS Primary Measure: 35 (43 on )  Risk Adjusted Statistical FOTO: 43  Limitation Score: 65% (57% on )  Category: Mobility  MDQ: 56% disability (44% on )    Objective     Eval 2023   Pain     8/10 with faces pain chart   Posture: patient has increased lumbar lordosis and obese abdomen     Gait Analysis: slight antalgic gait pattern at right LE, knee recurvatum preference      Myotomes     HIP FLX - Right 3/5, Left 4/5  HIP EXT - Right 3/5, Left 4/5     HIP ABD - Right 3/5, Left 4/5     KNEE FLX - Right 3/5, Left 4/5  KNEE EXT - Right 3/5, Left 4/5  ANKLE DF - Right 3/5, Left 4/5            Flexibility     HS = 60 B  Piriformis = min restr B         AROM     Normal range of motion at hips     Flexion: 100 B hips  Extension: 25 B hips               Special Tests     Jonatan Test: + B  90/90 Hamstrin B  Piriformis Test: min restr B         Functional Testing     5x STS = 20 sec     Balance     Tandem R forward = 3 sec w max sway  Tandem L forward = 3 sec w max sway  SLS R =  unable  SLS L = unable     TUG = 10 sec  TUG w water carry = 12 sec     2 MWT = 256'     Arlin received the following treatment:     Time Activities   Manual min    TherAct     TherEx* 10 min Core and LE and hip girdle stretching and strengthening, NuStep, balance training and core recruitment during functional movement   Gait     Neuro Re-ed     Modalities     E-Stim     Dry Needling     Canalith Repositioning           Home Exercises Provided and Patient Education Provided     Education provided:   -Plan of care, HEP, walking    Assessment     Exercise focus on functional core, low back and hip girdle recruitment and activity tolerance.  Patient participated well and was able to tolerate increased core recruitment exercise with increased resistance. Continued weighted core in supine with good response and advanced TB standing on foam for increased functional  core recruitment. No pain reported throughout or at end of session.    Encouraged continued walking even if it's just for a few minutes at a time.    Discussed ways to slowly increase walking while respecting patient's limitations. Patient and family receptive.    FOTO improved.      Feel patient will benefit from therapy for progressive core and hip girdle and LE strengthening and improved activity tolerance.    Patient prognosis is Good.      Anticipated barriers to physical therapy: cognitive limitations with behavioral dysfunction at times.    Goals: Arlin Is progressing well towards her goals.  Short Term Goals: 6 weeks   Patient will report at least 10% disability reduction from initial MDQ score to indicate clinically significant functional improvement  Patient will report at least 10 point increase on initial FOTO score to indicate clinically significant functional improvement  Patient will report 10% pain reduction     Long Term Goals: 12 weeks   Patient will report at least 20% disability reduction from initial MDQ score to indicate clinically  significant functional improvement  Patient will report at least 20% increase on initial FOTO score to indicate clinically significant functional improvement  Patient will report 50% overall perceived improvement  Patient will report ability to walk in grocery store without pain at B hips  Patient will demonstrate independence and compliance with HEP      Plan     2-3x/week x 12 weeks    Toma Dave, PT

## 2023-09-28 ENCOUNTER — CLINICAL SUPPORT (OUTPATIENT)
Dept: REHABILITATION | Facility: HOSPITAL | Age: 23
End: 2023-09-28
Payer: MEDICAID

## 2023-09-28 DIAGNOSIS — M54.16 LUMBAR RADICULOPATHY: Primary | ICD-10-CM

## 2023-09-28 PROCEDURE — 97110 THERAPEUTIC EXERCISES: CPT

## 2023-09-28 NOTE — PLAN OF CARE
Physical Therapy Treatment Note     Name: Arlin Daniels  Clinic Number: [id number]    Therapy Diagnosis:   Encounter Diagnosis   Name Primary?    Lumbar radiculopathy Yes     Physician: Ferny Whiting MD    Visit Date: 2023    Physician Orders: PT Eval and Treat  Medical Diagnosis from Referral: lumbar pain with radiculopathy  Evaluation Date: 2023  Authorization Period Expiration: medically necessary  Plan of Care Expiration: 2023  Visit # / Visits authorized: 7/medically necessary     Time In: 300  Time Out: 338  Total Billable Time: 38 minutes    Pertinent History: h/o B hip and LBP w radiculopathy B LE, Down Syndrome    Subjective     Patient reports: she had a long day with MD appointment and shopping at [employer] so she is reluctant and resistive to therapy this afternoon.  Parents are able to convince patient to participate.    CMS Impairment/Limitation/Restriction for FOTO Survey  Therapist reviewed FOTO scores for Arlin Daniels on 2023. FOTO documents entered into [employer] - see Media section.  Patient's Physical FS Primary Measure: 35 (43 on )  Risk Adjusted Statistical FOTO: 43  Limitation Score: 65% (57% on )  Category: Mobility  MDQ: 56% disability (44% on )    Objective     Eval 2023   Pain     8/10 with faces pain chart   Posture: patient has increased lumbar lordosis and obese abdomen     Gait Analysis: slight antalgic gait pattern at right LE, knee recurvatum preference      Myotomes     HIP FLX - Right 3/5, Left 4/5  HIP EXT - Right 3/5, Left 4/5     HIP ABD - Right 3/5, Left 4/5     KNEE FLX - Right 3/5, Left 4/5  KNEE EXT - Right 3/5, Left 4/5  ANKLE DF - Right 3/5, Left 4/5            Flexibility     HS = 60 B  Piriformis = min restr B         AROM     Normal range of motion at hips     Flexion: 100 B hips  Extension: 25 B hips               Special Tests     Jonatan Test: + B  90/90 Hamstrin B  Piriformis Test: min restr B         Functional  Testing     5x STS = 20 sec     Balance     Tandem R forward = 3 sec w max sway  Tandem L forward = 3 sec w max sway  SLS R = unable  SLS L = unable     TUG = 10 sec  TUG w water carry = 12 sec     2 MWT = 256'     Arlin received the following treatment:     Time Activities   Manual min    TherAct     TherEx* 38 min Core and LE and hip girdle stretching and strengthening, NuStep, balance training and core recruitment during functional movement   Gait     Neuro Re-ed     Modalities     E-Stim     Dry Needling     Canalith Repositioning           Home Exercises Provided and Patient Education Provided     Education provided:   -Plan of care, HEP, walking    Assessment     Exercise focus on functional core, low back and hip girdle recruitment and activity tolerance.  Held exercise due to fatigue and reluctance of day.      Encouraged continued walking even if it's just for a few minutes at a time.    Discussed ways to slowly increase walking while respecting patient's limitations. Patient and family receptive.    Feel patient will benefit from therapy for progressive core and hip girdle and LE strengthening and improved activity tolerance.    Patient prognosis is Good.      Anticipated barriers to physical therapy: cognitive limitations with behavioral dysfunction at times.    Goals: Arlin Is progressing well towards her goals.  Short Term Goals: 6 weeks   Patient will report at least 10% disability reduction from initial MDQ score to indicate clinically significant functional improvement  Patient will report at least 10 point increase on initial FOTO score to indicate clinically significant functional improvement  Patient will report 10% pain reduction     Long Term Goals: 12 weeks   Patient will report at least 20% disability reduction from initial MDQ score to indicate clinically significant functional improvement  Patient will report at least 20% increase on initial FOTO score to indicate clinically significant  functional improvement  Patient will report 50% overall perceived improvement  Patient will report ability to walk in grocery store without pain at B hips  Patient will demonstrate independence and compliance with HEP      Plan     2-3x/week x 12 weeks    Toma Dave, PT

## 2023-10-05 ENCOUNTER — CLINICAL SUPPORT (OUTPATIENT)
Dept: REHABILITATION | Facility: HOSPITAL | Age: 23
End: 2023-10-05
Payer: MEDICAID

## 2023-10-05 DIAGNOSIS — M54.16 LUMBAR RADICULOPATHY: Primary | ICD-10-CM

## 2023-10-05 PROCEDURE — 97110 THERAPEUTIC EXERCISES: CPT

## 2023-10-09 ENCOUNTER — OFFICE VISIT (OUTPATIENT)
Dept: FAMILY MEDICINE | Facility: CLINIC | Age: 23
End: 2023-10-09
Payer: MEDICAID

## 2023-10-09 VITALS
HEIGHT: 58 IN | TEMPERATURE: 98 F | RESPIRATION RATE: 18 BRPM | OXYGEN SATURATION: 96 % | HEART RATE: 75 BPM | DIASTOLIC BLOOD PRESSURE: 70 MMHG | BODY MASS INDEX: 45.63 KG/M2 | WEIGHT: 217.38 LBS | SYSTOLIC BLOOD PRESSURE: 114 MMHG

## 2023-10-09 DIAGNOSIS — L02.91 ABSCESS: Primary | ICD-10-CM

## 2023-10-09 PROCEDURE — 3008F BODY MASS INDEX DOCD: CPT | Mod: CPTII,,, | Performed by: FAMILY MEDICINE

## 2023-10-09 PROCEDURE — 1160F RVW MEDS BY RX/DR IN RCRD: CPT | Mod: CPTII,,, | Performed by: FAMILY MEDICINE

## 2023-10-09 PROCEDURE — 1159F MED LIST DOCD IN RCRD: CPT | Mod: CPTII,,, | Performed by: FAMILY MEDICINE

## 2023-10-09 PROCEDURE — 3078F PR MOST RECENT DIASTOLIC BLOOD PRESSURE < 80 MM HG: ICD-10-PCS | Mod: CPTII,,, | Performed by: FAMILY MEDICINE

## 2023-10-09 PROCEDURE — 3078F DIAST BP <80 MM HG: CPT | Mod: CPTII,,, | Performed by: FAMILY MEDICINE

## 2023-10-09 PROCEDURE — 3074F SYST BP LT 130 MM HG: CPT | Mod: CPTII,,, | Performed by: FAMILY MEDICINE

## 2023-10-09 PROCEDURE — 3008F PR BODY MASS INDEX (BMI) DOCUMENTED: ICD-10-PCS | Mod: CPTII,,, | Performed by: FAMILY MEDICINE

## 2023-10-09 PROCEDURE — 1159F PR MEDICATION LIST DOCUMENTED IN MEDICAL RECORD: ICD-10-PCS | Mod: CPTII,,, | Performed by: FAMILY MEDICINE

## 2023-10-09 PROCEDURE — 99214 OFFICE O/P EST MOD 30 MIN: CPT | Mod: ,,, | Performed by: FAMILY MEDICINE

## 2023-10-09 PROCEDURE — 3051F HG A1C>EQUAL 7.0%<8.0%: CPT | Mod: CPTII,,, | Performed by: FAMILY MEDICINE

## 2023-10-09 PROCEDURE — 3051F PR MOST RECENT HEMOGLOBIN A1C LEVEL 7.0 - < 8.0%: ICD-10-PCS | Mod: CPTII,,, | Performed by: FAMILY MEDICINE

## 2023-10-09 PROCEDURE — 1160F PR REVIEW ALL MEDS BY PRESCRIBER/CLIN PHARMACIST DOCUMENTED: ICD-10-PCS | Mod: CPTII,,, | Performed by: FAMILY MEDICINE

## 2023-10-09 PROCEDURE — 3074F PR MOST RECENT SYSTOLIC BLOOD PRESSURE < 130 MM HG: ICD-10-PCS | Mod: CPTII,,, | Performed by: FAMILY MEDICINE

## 2023-10-09 PROCEDURE — 99214 PR OFFICE/OUTPT VISIT, EST, LEVL IV, 30-39 MIN: ICD-10-PCS | Mod: ,,, | Performed by: FAMILY MEDICINE

## 2023-10-09 RX ORDER — SULFAMETHOXAZOLE AND TRIMETHOPRIM 800; 160 MG/1; MG/1
1 TABLET ORAL 2 TIMES DAILY
Qty: 14 TABLET | Refills: 0 | Status: SHIPPED | OUTPATIENT
Start: 2023-10-09 | End: 2023-10-16

## 2023-10-09 RX ORDER — MUPIROCIN 20 MG/G
OINTMENT TOPICAL 2 TIMES DAILY
Qty: 15 G | Refills: 1 | Status: SHIPPED | OUTPATIENT
Start: 2023-10-09 | End: 2023-12-12

## 2023-10-09 NOTE — PROGRESS NOTES
"Subjective:       Patient ID: Arlin Daniels is a 23 y.o. female.    Chief Complaint: Lumps on inside of left leg      lump        Review of Systems   Constitutional: Negative.    Respiratory: Negative.     Cardiovascular: Negative.    Integumentary:         Lump: located on left thigh, no drainage, increased pain           Objective:      /70 (BP Location: Right arm, Patient Position: Sitting, BP Method: Large (Manual))   Pulse 75   Temp 97.8 °F (36.6 °C)   Resp 18   Ht 4' 10" (1.473 m)   Wt 98.6 kg (217 lb 6.4 oz)   SpO2 96%   BMI 45.44 kg/m²    Physical Exam  Constitutional:       Appearance: Normal appearance.   Cardiovascular:      Rate and Rhythm: Normal rate and regular rhythm.      Heart sounds: Normal heart sounds.   Pulmonary:      Effort: Pulmonary effort is normal.      Breath sounds: Normal breath sounds.   Skin:     Comments: Abscess on left thigh   Neurological:      Mental Status: She is alert.   Psychiatric:         Mood and Affect: Mood normal.         Behavior: Behavior normal.         Thought Content: Thought content normal.         Judgment: Judgment normal.               Assessment:       Problem List Items Addressed This Visit    None  Visit Diagnoses       Abscess    -  Primary    Relevant Medications    sulfamethoxazole-trimethoprim 800-160mg (BACTRIM DS) 800-160 mg Tab    mupirocin (BACTROBAN) 2 % ointment               Plan:   1. Abscess  -     sulfamethoxazole-trimethoprim 800-160mg (BACTRIM DS) 800-160 mg Tab; Take 1 tablet by mouth 2 (two) times daily. for 7 days  Dispense: 14 tablet; Refill: 0  -     mupirocin (BACTROBAN) 2 % ointment; Apply topically 2 (two) times daily.  Dispense: 15 g; Refill: 1  Rx for Bactrim DS b.i.d. x1 week   Warm compress  Monitor   Rx for Bactroban nasal b.i.d. x5 days   Return to clinic with any concerns             "

## 2023-10-10 ENCOUNTER — CLINICAL SUPPORT (OUTPATIENT)
Dept: REHABILITATION | Facility: HOSPITAL | Age: 23
End: 2023-10-10
Payer: MEDICAID

## 2023-10-10 DIAGNOSIS — M54.16 LUMBAR RADICULOPATHY: Primary | ICD-10-CM

## 2023-10-10 PROCEDURE — 97110 THERAPEUTIC EXERCISES: CPT

## 2023-10-10 NOTE — PLAN OF CARE
Physical Therapy Treatment Note     Name: Arlin Daniels  Clinic Number: [id number]    Therapy Diagnosis:   Encounter Diagnosis   Name Primary?    Lumbar radiculopathy Yes     Physician: Ferny Whiting MD    Visit Date: 10/10/2023    Physician Orders: PT Eval and Treat  Medical Diagnosis from Referral: lumbar pain with radiculopathy  Evaluation Date: 2023  Authorization Period Expiration: medically necessary  Plan of Care Expiration: 2023  Visit # / Visits authorized: 10/medically necessary     Time In: 920  Time Out: 1000  Total Billable Time: 40 minutes    Pertinent History: h/o B hip and LBP w radiculopathy B LE, Down Syndrome    Subjective     Patient appears happy this morning.  No new complaints.  Mom says patient walked once this week.    CMS Impairment/Limitation/Restriction for FOTO Survey  Therapist reviewed FOTO scores for Arlin Daniels on 2023. FOTO documents entered into [employer] - see Media section.  Patient's Physical FS Primary Measure: 35 (43 on )  Risk Adjusted Statistical FOTO: 43  Limitation Score: 65% (57% on )  Category: Mobility  MDQ: 56% disability (44% on )    Objective     Eval 2023   Pain     8/10 with faces pain chart   Posture: patient has increased lumbar lordosis and obese abdomen     Gait Analysis: slight antalgic gait pattern at right LE, knee recurvatum preference      Myotomes     HIP FLX - Right 3/5, Left 4/5  HIP EXT - Right 3/5, Left 4/5     HIP ABD - Right 3/5, Left 4/5     KNEE FLX - Right 3/5, Left 4/5  KNEE EXT - Right 3/5, Left 4/5  ANKLE DF - Right 3/5, Left 4/5            Flexibility     HS = 60 B  Piriformis = min restr B         AROM     Normal range of motion at hips     Flexion: 100 B hips  Extension: 25 B hips               Special Tests     Jonatan Test: + B  90/90 Hamstrin B  Piriformis Test: min restr B         Functional Testing     5x STS = 20 sec     Balance     Tandem R forward = 3 sec w max sway  Tandem L forward = 3  sec w max sway  SLS R = unable  SLS L = unable     TUG = 10 sec  TUG w water carry = 12 sec     2 MWT = 256'     Arlin received the following treatment:     Time Activities   Manual min    TherAct     TherEx* 40 min Core and LE and hip girdle stretching and strengthening, NuStep, balance training and core recruitment during functional movement   Gait     Neuro Re-ed     Modalities     E-Stim     Dry Needling     Canalith Repositioning           Home Exercises Provided and Patient Education Provided     Education provided:   -Plan of care, HEP, walking    Assessment     Exercise focus on functional core, low back and hip girdle recruitment and activity tolerance.  Added weighted LAQ and KTC marches for increased core recruitment and good response. Patient worked well this morning.     Encouraged continued walking even if it's just for a few minutes at a time.    Discussed ways to slowly increase walking while respecting patient's limitations. Patient and family receptive.    Feel patient will benefit from therapy for progressive core and hip girdle and LE strengthening and improved activity tolerance.    Patient prognosis is Good.      Anticipated barriers to physical therapy: cognitive limitations with behavioral dysfunction at times.    Goals: Arlin Is progressing well towards her goals.  Short Term Goals: 6 weeks   Patient will report at least 10% disability reduction from initial MDQ score to indicate clinically significant functional improvement  Patient will report at least 10 point increase on initial FOTO score to indicate clinically significant functional improvement  Patient will report 10% pain reduction     Long Term Goals: 12 weeks   Patient will report at least 20% disability reduction from initial MDQ score to indicate clinically significant functional improvement  Patient will report at least 20% increase on initial FOTO score to indicate clinically significant functional improvement  Patient will  report 50% overall perceived improvement  Patient will report ability to walk in grocery store without pain at B hips  Patient will demonstrate independence and compliance with HEP      Plan     2-3x/week x 12 weeks    Toma Dave, PT

## 2023-10-12 ENCOUNTER — CLINICAL SUPPORT (OUTPATIENT)
Dept: REHABILITATION | Facility: HOSPITAL | Age: 23
End: 2023-10-12
Payer: MEDICAID

## 2023-10-12 DIAGNOSIS — M54.16 LUMBAR RADICULOPATHY: Primary | ICD-10-CM

## 2023-10-12 PROCEDURE — 97110 THERAPEUTIC EXERCISES: CPT

## 2023-10-12 NOTE — PLAN OF CARE
Physical Therapy Treatment Note     Name: Arlin Daniels  Clinic Number: [id number]    Therapy Diagnosis:   Encounter Diagnosis   Name Primary?    Lumbar radiculopathy Yes     Physician: Ferny Whiting MD    Visit Date: 10/12/2023    Physician Orders: PT Eval and Treat  Medical Diagnosis from Referral: lumbar pain with radiculopathy  Evaluation Date: 2023  Authorization Period Expiration: medically necessary  Plan of Care Expiration: 2023  Visit # / Visits authorized: medically necessary     Time In: 1004  Time Out: 1057  Total Billable Time: 53 minutes    Pertinent History: h/o B hip and LBP w radiculopathy B LE, Down Syndrome    Subjective     Patient upset this morning and reluctant to participate.  Much encouragement needed.    CMS Impairment/Limitation/Restriction for FOTO Survey  Therapist reviewed FOTO scores for Arlin Daniels on 2023. FOTO documents entered into [employer] - see Media section.  Patient's Physical FS Primary Measure: 35 (43 on )  Risk Adjusted Statistical FOTO: 43  Limitation Score: 65% (57% on )  Category: Mobility  MDQ: 56% disability (44% on )    Objective     Eval 2023   Pain     8/10 with faces pain chart   Posture: patient has increased lumbar lordosis and obese abdomen     Gait Analysis: slight antalgic gait pattern at right LE, knee recurvatum preference      Myotomes     HIP FLX - Right 3/5, Left 4/5  HIP EXT - Right 3/5, Left 4/5     HIP ABD - Right 3/5, Left 4/5     KNEE FLX - Right 3/5, Left 4/5  KNEE EXT - Right 3/5, Left 4/5  ANKLE DF - Right 3/5, Left 4/5            Flexibility     HS = 60 B  Piriformis = min restr B         AROM     Normal range of motion at hips     Flexion: 100 B hips  Extension: 25 B hips               Special Tests     Jonatan Test: + B  90/90 Hamstrin B  Piriformis Test: min restr B         Functional Testing     5x STS = 20 sec     Balance     Tandem R forward = 3 sec w max sway  Tandem L forward = 3 sec w max  sway  SLS R = unable  SLS L = unable     TUG = 10 sec  TUG w water carry = 12 sec     2 MWT = 256'     Arlin received the following treatment:     Time Activities   Manual min    TherAct     TherEx* 53 min Core and LE and hip girdle stretching and strengthening, NuStep, balance training and core recruitment during functional movement   Gait     Neuro Re-ed     Modalities     E-Stim     Dry Needling     Canalith Repositioning           Home Exercises Provided and Patient Education Provided     Education provided:   -Plan of care, HEP, walking    Assessment     Exercise focus on functional core, low back and hip girdle recruitment and activity tolerance.  Continued weighted LAQ and KTC marches for increased core recruitment and good response during functional kicks. Increased time required due to agitation this morning.     Encouraged continued walking even if it's just for a few minutes at a time.    Discussed ways to slowly increase walking while respecting patient's limitations. Patient and family receptive.    Feel patient will benefit from therapy for progressive core and hip girdle and LE strengthening and improved activity tolerance.    Patient prognosis is Good.      Anticipated barriers to physical therapy: cognitive limitations with behavioral dysfunction at times.    Goals: Arlin Is progressing well towards her goals.  Short Term Goals: 6 weeks   Patient will report at least 10% disability reduction from initial MDQ score to indicate clinically significant functional improvement  Patient will report at least 10 point increase on initial FOTO score to indicate clinically significant functional improvement  Patient will report 10% pain reduction     Long Term Goals: 12 weeks   Patient will report at least 20% disability reduction from initial MDQ score to indicate clinically significant functional improvement  Patient will report at least 20% increase on initial FOTO score to indicate clinically significant  functional improvement  Patient will report 50% overall perceived improvement  Patient will report ability to walk in grocery store without pain at B hips  Patient will demonstrate independence and compliance with HEP      Plan     2-3x/week x 12 weeks    Toma Dave, PT

## 2023-10-17 ENCOUNTER — CLINICAL SUPPORT (OUTPATIENT)
Dept: REHABILITATION | Facility: HOSPITAL | Age: 23
End: 2023-10-17
Payer: MEDICAID

## 2023-10-17 ENCOUNTER — PATIENT MESSAGE (OUTPATIENT)
Dept: ADMINISTRATIVE | Facility: HOSPITAL | Age: 23
End: 2023-10-17
Payer: MEDICAID

## 2023-10-17 DIAGNOSIS — M54.16 LUMBAR RADICULOPATHY: Primary | ICD-10-CM

## 2023-10-17 PROCEDURE — 97110 THERAPEUTIC EXERCISES: CPT

## 2023-10-17 NOTE — PLAN OF CARE
Physical Therapy Treatment Note     Name: Arlin Daniels  Clinic Number: [id number]    Therapy Diagnosis:   Encounter Diagnosis   Name Primary?    Lumbar radiculopathy Yes     Physician: Ferny Whiting MD    Visit Date: 10/17/2023    Physician Orders: PT Eval and Treat  Medical Diagnosis from Referral: lumbar pain with radiculopathy  Evaluation Date: 2023  Authorization Period Expiration: medically necessary  Plan of Care Expiration: 2023  Visit # / Visits authorized: 12/medically necessary     Time In: 1004  Time Out: 1044  Total Billable Time: 40 minutes    Pertinent History: h/o B hip and LBP w radiculopathy B LE, Down Syndrome    Subjective     Patient in a good mood and smiling this morning.  Her mom says she was able to walk throughout [facility] without pain yesterday and was able to ride her bike at home this week.    CMS Impairment/Limitation/Restriction for FOTO Survey  Therapist reviewed FOTO scores for Arlin Daniels on 2023. FOTO documents entered into [employer] - see Media section.  Patient's Physical FS Primary Measure: 35 (43 on ) (54 on 10/17)  Risk Adjusted Statistical FOTO: 43  Limitation Score: 65% (57% on ) (46% on 10/17)  Category: Mobility  MDQ: 56% disability (44% on )    Objective     Eval 2023   Pain     8/10 with faces pain chart   Posture: patient has increased lumbar lordosis and obese abdomen     Gait Analysis: slight antalgic gait pattern at right LE, knee recurvatum preference      Myotomes     HIP FLX - Right 3/5, Left 4/5  HIP EXT - Right 3/5, Left 4/5     HIP ABD - Right 3/5, Left 4/5     KNEE FLX - Right 3/5, Left 4/5  KNEE EXT - Right 3/5, Left 4/5  ANKLE DF - Right 3/5, Left 4/5            Flexibility     HS = 60 B  Piriformis = min restr B         AROM     Normal range of motion at hips     Flexion: 100 B hips  Extension: 25 B hips               Special Tests     Jonatan Test: + B  90/90 Hamstrin B  Piriformis Test: min restr B          Functional Testing     5x STS = 20 sec     Balance     Tandem R forward = 3 sec w max sway  Tandem L forward = 3 sec w max sway  SLS R = unable  SLS L = unable     TUG = 10 sec  TUG w water carry = 12 sec     2 MWT = 256'     Arlin received the following treatment:     Time Activities   Manual min    TherAct     TherEx* 40 min Core and LE and hip girdle stretching and strengthening, NuStep, balance training and core recruitment during functional movement   Gait     Neuro Re-ed     Modalities     E-Stim     Dry Needling     Canalith Repositioning           Home Exercises Provided and Patient Education Provided     Education provided:   -Plan of care, HEP, walking    Assessment     Exercise focus on functional core, low back and hip girdle recruitment and activity tolerance.  Continued weighted LAQ and KTC marches for increased core recruitment and good response during functional kicks.Advanced swiss ball core exercise and patient tolerates increased time with exercises today.  Great session.  FOTO improved.    Encouraged continued walking even if it's just for a few minutes at a time.    Discussed ways to slowly increase walking while respecting patient's limitations. Patient and family receptive.    Feel patient will benefit from therapy for progressive core and hip girdle and LE strengthening and improved activity tolerance.    Patient prognosis is Good.      Anticipated barriers to physical therapy: cognitive limitations with behavioral dysfunction at times.    Goals: Arlin Is progressing well towards her goals.  Short Term Goals: 6 weeks   Patient will report at least 10% disability reduction from initial MDQ score to indicate clinically significant functional improvement  Patient will report at least 10 point increase on initial FOTO score to indicate clinically significant functional improvement  Patient will report 10% pain reduction     Long Term Goals: 12 weeks   Patient will report at least 20% disability  reduction from initial MDQ score to indicate clinically significant functional improvement  Patient will report at least 20% increase on initial FOTO score to indicate clinically significant functional improvement  Patient will report 50% overall perceived improvement  Patient will report ability to walk in grocery store without pain at B hips  Patient will demonstrate independence and compliance with HEP      Plan     2-3x/week x 12 weeks    Toma Dave, PT

## 2023-10-24 ENCOUNTER — CLINICAL SUPPORT (OUTPATIENT)
Dept: REHABILITATION | Facility: HOSPITAL | Age: 23
End: 2023-10-24
Payer: MEDICAID

## 2023-10-24 DIAGNOSIS — M54.16 LUMBAR RADICULOPATHY: Primary | ICD-10-CM

## 2023-10-24 PROCEDURE — 97110 THERAPEUTIC EXERCISES: CPT

## 2023-10-24 NOTE — PLAN OF CARE
Physical Therapy Treatment Note     Name: Arlin Daniels  Clinic Number: [id number]    Therapy Diagnosis:   Encounter Diagnosis   Name Primary?    Lumbar radiculopathy Yes     Physician: Ferny Whiting MD    Visit Date: 10/24/2023    Physician Orders: PT Eval and Treat  Medical Diagnosis from Referral: lumbar pain with radiculopathy  Evaluation Date: 2023  Authorization Period Expiration: medically necessary  Plan of Care Expiration: 2023  Visit # / Visits authorized: 13/medically necessary     Time In: 949  Time Out: 1044  Total Billable Time: 55 minutes    Pertinent History: h/o B hip and LBP w radiculopathy B LE, Down Syndrome    Subjective     Patient and mom report less pain complaints and improved ease with walking in stores    CMS Impairment/Limitation/Restriction for FOTO Survey  Therapist reviewed FOTO scores for Arlin Daniels on 2023. FOTO documents entered into [employer] - see Media section.  Patient's Physical FS Primary Measure: 35 (43 on ) (54 on 10/17)  Risk Adjusted Statistical FOTO: 43  Limitation Score: 65% (57% on ) (46% on 10/17)  Category: Mobility  MDQ: 56% disability (44% on )    Objective     Eval 2023   Pain     8/10 with faces pain chart   Posture: patient has increased lumbar lordosis and obese abdomen     Gait Analysis: slight antalgic gait pattern at right LE, knee recurvatum preference      Myotomes     HIP FLX - Right 3/5, Left 4/5  HIP EXT - Right 3/5, Left 4/5     HIP ABD - Right 3/5, Left 4/5     KNEE FLX - Right 3/5, Left 4/5  KNEE EXT - Right 3/5, Left 4/5  ANKLE DF - Right 3/5, Left 4/5            Flexibility     HS = 60 B  Piriformis = min restr B         AROM     Normal range of motion at hips     Flexion: 100 B hips  Extension: 25 B hips               Special Tests     Jonatan Test: + B  90/90 Hamstrin B  Piriformis Test: min restr B         Functional Testing     5x STS = 20 sec     Balance     Tandem R forward = 3 sec w max sway  Tandem  L forward = 3 sec w max sway  SLS R = unable  SLS L = unable     TUG = 10 sec  TUG w water carry = 12 sec     2 MWT = 256'     Arlin received the following treatment:     Time Activities   Manual min    TherAct     TherEx* 55 min Core and LE and hip girdle stretching and strengthening, NuStep, balance training and core recruitment during functional movement   Gait     Neuro Re-ed     Modalities     E-Stim     Dry Needling     Canalith Repositioning           Home Exercises Provided and Patient Education Provided     Education provided:   -Plan of care, HEP, walking    Assessment     Exercise focus on functional core, low back and hip girdle recruitment and activity tolerance.  Continued weighted LAQ and marches for increased core recruitment and good response during functional kicks. Advanced swiss ball core exercise and patient tolerates increased time with exercises today.  Added lateral step ups with good tolerance. Great session.      Encouraged continued walking even if it's just for a few minutes at a time.    Discussed ways to slowly increase walking while respecting patient's limitations. Patient and family receptive.    Feel patient will benefit from therapy for progressive core and hip girdle and LE strengthening and improved activity tolerance.    Patient prognosis is Good.      Anticipated barriers to physical therapy: cognitive limitations with behavioral dysfunction at times.    Goals: Arlin Is progressing well towards her goals.  Short Term Goals: 6 weeks   Patient will report at least 10% disability reduction from initial MDQ score to indicate clinically significant functional improvement  Patient will report at least 10 point increase on initial FOTO score to indicate clinically significant functional improvement  Patient will report 10% pain reduction     Long Term Goals: 12 weeks   Patient will report at least 20% disability reduction from initial MDQ score to indicate clinically significant  functional improvement  Patient will report at least 20% increase on initial FOTO score to indicate clinically significant functional improvement  Patient will report 50% overall perceived improvement  Patient will report ability to walk in grocery store without pain at B hips  Patient will demonstrate independence and compliance with HEP      Plan     2-3x/week x 12 weeks    Toma Dave, PT

## 2023-10-31 ENCOUNTER — CLINICAL SUPPORT (OUTPATIENT)
Dept: REHABILITATION | Facility: HOSPITAL | Age: 23
End: 2023-10-31
Payer: MEDICAID

## 2023-10-31 DIAGNOSIS — M25.559 HIP PAIN, UNSPECIFIED LATERALITY: Primary | ICD-10-CM

## 2023-10-31 PROCEDURE — 97110 THERAPEUTIC EXERCISES: CPT

## 2023-10-31 NOTE — PLAN OF CARE
Physical Therapy Treatment Note     Name: Arlin Daniels  Clinic Number: [id number]    Therapy Diagnosis:   Encounter Diagnosis   Name Primary?    Hip pain, unspecified laterality Yes     Physician: Ferny Whiting MD    Visit Date: 10/31/2023    Physician Orders: PT Eval and Treat  Medical Diagnosis from Referral: lumbar pain with radiculopathy  Evaluation Date: 2023  Authorization Period Expiration: medically necessary  Plan of Care Expiration: 2023  Visit # / Visits authorized: 13/medically necessary     Time In: 1000  Time Out: 1100  Total Billable Time: 60 minutes    Pertinent History: h/o B hip and LBP w radiculopathy B LE, Down Syndrome    Subjective     Patient and mom reported doing well. Had a big day scheduled and  is her favorite holiday.     CMS Impairment/Limitation/Restriction for FOTO Survey  Therapist reviewed FOTO scores for Arlin Daniels on 2023. FOTO documents entered into [employer] - see Media section.  Patient's Physical FS Primary Measure: 35 (43 on ) (54 on 10/17)  Risk Adjusted Statistical FOTO: 43  Limitation Score: 65% (57% on ) (46% on 10/17)  Category: Mobility  MDQ: 56% disability (44% on )    Objective     Eval 2023   Pain     8/10 with faces pain chart   Posture: patient has increased lumbar lordosis and obese abdomen     Gait Analysis: slight antalgic gait pattern at right LE, knee recurvatum preference      Myotomes     HIP FLX - Right 3/5, Left 4/5  HIP EXT - Right 3/5, Left 4/5     HIP ABD - Right 3/5, Left 4/5     KNEE FLX - Right 3/5, Left 4/5  KNEE EXT - Right 3/5, Left 4/5  ANKLE DF - Right 3/5, Left 4/5            Flexibility     HS = 60 B  Piriformis = min restr B         AROM     Normal range of motion at hips     Flexion: 100 B hips  Extension: 25 B hips               Special Tests     Jonatan Test: + B  90/90 Hamstrin B  Piriformis Test: min restr B         Functional Testing     5x STS = 20 sec     Balance     Tandem R  "forward = 3 sec w max sway  Tandem L forward = 3 sec w max sway  SLS R = unable  SLS L = unable     TUG = 10 sec  TUG w water carry = 12 sec     2 MWT = 256'     Arlin received the following treatment:     Time Activities   Manual min    TherAct     TherEx* 60 min Core and LE and hip girdle stretching and strengthening, NuStep, balance training and core recruitment during functional movement   Gait     Neuro Re-ed     Modalities     E-Stim     Dry Needling     Canalith Repositioning           Home Exercises Provided and Patient Education Provided     Education provided:   -Plan of care, HEP, walking    Assessment     Patient required some time to "warm-up" to covering therapist as primary therapist off today. Parents assisted in helping to get patient to participate in therapy. Once active, patient worked well with therapist.   Exercise focus on functional core, low back and hip girdle recruitment and activity tolerance.  Continued weighted LAQ and marches for increased core recruitment and good response during functional kicks. Advanced swiss ball core exercise and patient tolerates increased time with exercises today.  Added lateral step ups with good tolerance however patient reporting some hip pain L>R as number of reps increased.       Encouraged continued walking and family was going shopping at [employer] today. Patient and family receptive.    Feel patient will benefit from therapy for progressive core and hip girdle and LE strengthening and improved activity tolerance.    Patient prognosis is Good.      Anticipated barriers to physical therapy: cognitive limitations with behavioral dysfunction at times.    Goals: Arlin Is progressing well towards her goals.  Short Term Goals: 6 weeks   Patient will report at least 10% disability reduction from initial MDQ score to indicate clinically significant functional improvement  Patient will report at least 10 point increase on initial FOTO score to indicate clinically " significant functional improvement  Patient will report 10% pain reduction     Long Term Goals: 12 weeks   Patient will report at least 20% disability reduction from initial MDQ score to indicate clinically significant functional improvement  Patient will report at least 20% increase on initial FOTO score to indicate clinically significant functional improvement  Patient will report 50% overall perceived improvement  Patient will report ability to walk in grocery store without pain at B hips  Patient will demonstrate independence and compliance with HEP      Plan     2-3x/week x 12 weeks    Tejinder Garcia, PT

## 2023-11-07 DIAGNOSIS — F32.A DEPRESSION, UNSPECIFIED DEPRESSION TYPE: ICD-10-CM

## 2023-11-07 RX ORDER — ARIPIPRAZOLE 2 MG/1
2 TABLET ORAL
Qty: 30 TABLET | Refills: 1 | Status: SHIPPED | OUTPATIENT
Start: 2023-11-07 | End: 2024-01-10

## 2023-11-07 RX ORDER — DULOXETIN HYDROCHLORIDE 60 MG/1
CAPSULE, DELAYED RELEASE ORAL
Qty: 30 CAPSULE | Refills: 3 | Status: SHIPPED | OUTPATIENT
Start: 2023-11-07 | End: 2024-03-18

## 2023-11-09 ENCOUNTER — CLINICAL SUPPORT (OUTPATIENT)
Dept: REHABILITATION | Facility: HOSPITAL | Age: 23
End: 2023-11-09
Payer: MEDICAID

## 2023-11-09 DIAGNOSIS — M25.559 HIP PAIN, UNSPECIFIED LATERALITY: Primary | ICD-10-CM

## 2023-11-09 PROCEDURE — 97110 THERAPEUTIC EXERCISES: CPT

## 2023-11-09 NOTE — PLAN OF CARE
"  Physical Therapy Treatment Note     Name: Arlin Daniels  Clinic Number: [id number]    Therapy Diagnosis:   Encounter Diagnosis   Name Primary?    Hip pain, unspecified laterality Yes     Physician: Ferny Whiting MD    Visit Date: 2023    Physician Orders: PT Eval and Treat  Medical Diagnosis from Referral: lumbar pain with radiculopathy  Evaluation Date: 2023  Authorization Period Expiration: medically necessary  Plan of Care Expiration: 2023  Visit # / Visits authorized: 13/medically necessary     Time In: 1020  Time Out: 1110  Total Billable Time: 50 minutes    Pertinent History: h/o B hip and LBP w radiculopathy B LE, Down Syndrome    Subjective     Patient upset this morning because she is here with grandmother due to mother being ill with a recently found "mass on her liver."    CMS Impairment/Limitation/Restriction for FOTO Survey  Therapist reviewed FOTO scores for Arlin Daniels on 2023. FOTO documents entered into [employer] - see Media section.  Patient's Physical FS Primary Measure: 35 (43 on ) (54 on 10/17)  Risk Adjusted Statistical FOTO: 43  Limitation Score: 65% (57% on ) (46% on 10/17)  Category: Mobility  MDQ: 56% disability (44% on )    Objective     Eval 2023   Pain     8/10 with faces pain chart   Posture: patient has increased lumbar lordosis and obese abdomen     Gait Analysis: slight antalgic gait pattern at right LE, knee recurvatum preference      Myotomes     HIP FLX - Right 3/5, Left 4/5  HIP EXT - Right 3/5, Left 4/5     HIP ABD - Right 3/5, Left 4/5     KNEE FLX - Right 3/5, Left 4/5  KNEE EXT - Right 3/5, Left 4/5  ANKLE DF - Right 3/5, Left 4/5            Flexibility     HS = 60 B  Piriformis = min restr B         AROM     Normal range of motion at hips     Flexion: 100 B hips  Extension: 25 B hips               Special Tests     Jonatan Test: + B  90/90 Hamstrin B  Piriformis Test: min restr B         Functional Testing     5x STS = 20 sec   "   Balance     Tandem R forward = 3 sec w max sway  Tandem L forward = 3 sec w max sway  SLS R = unable  SLS L = unable     TUG = 10 sec  TUG w water carry = 12 sec     2 MWT = 256'     Arlin received the following treatment:     Time Activities   Manual min    TherAct     TherEx* 50 min Core and LE and hip girdle stretching and strengthening, NuStep, balance training and core recruitment during functional movement   Gait     Neuro Re-ed     Modalities     E-Stim     Dry Needling     Canalith Repositioning           Home Exercises Provided and Patient Education Provided     Education provided:   -Plan of care, HEP, walking    Assessment     Exercise focus on functional core, low back and hip girdle recruitment and activity tolerance.  Much encouragement needed throughout session to complete due to behavioral difficulties, but able to complete.    Encouraged continued walking even if it's just for a few minutes at a time.    Discussed ways to slowly increase walking while respecting patient's limitations. Patient and family receptive.    Feel patient will benefit from therapy for progressive core and hip girdle and LE strengthening and improved activity tolerance.    Patient prognosis is Good.      Anticipated barriers to physical therapy: cognitive limitations with behavioral dysfunction at times.    Goals: Arlin Is progressing well towards her goals.  Short Term Goals: 6 weeks   Patient will report at least 10% disability reduction from initial MDQ score to indicate clinically significant functional improvement  Patient will report at least 10 point increase on initial FOTO score to indicate clinically significant functional improvement  Patient will report 10% pain reduction     Long Term Goals: 12 weeks   Patient will report at least 20% disability reduction from initial MDQ score to indicate clinically significant functional improvement  Patient will report at least 20% increase on initial FOTO score to indicate  clinically significant functional improvement  Patient will report 50% overall perceived improvement  Patient will report ability to walk in grocery store without pain at B hips  Patient will demonstrate independence and compliance with HEP      Plan     2-3x/week x 12 weeks    Toma Dave, PT

## 2023-11-14 ENCOUNTER — CLINICAL SUPPORT (OUTPATIENT)
Dept: REHABILITATION | Facility: HOSPITAL | Age: 23
End: 2023-11-14
Payer: MEDICAID

## 2023-11-14 DIAGNOSIS — M25.559 HIP PAIN, UNSPECIFIED LATERALITY: Primary | ICD-10-CM

## 2023-11-14 PROCEDURE — 97110 THERAPEUTIC EXERCISES: CPT

## 2023-11-14 NOTE — PLAN OF CARE
Physical Therapy Treatment Note     Name: Arlin Daniels  Clinic Number: [id number]    Therapy Diagnosis:   Encounter Diagnosis   Name Primary?    Hip pain, unspecified laterality Yes     Physician: Ferny Whiting MD    Visit Date: 2023    Physician Orders: PT Eval and Treat  Medical Diagnosis from Referral: lumbar pain with radiculopathy  Evaluation Date: 2023  Authorization Period Expiration: medically necessary  Plan of Care Expiration: 2023  Visit # / Visits authorized: 14/medically necessary     Time In: 955  Time Out: 1055  Total Billable Time: 60 minutes    Pertinent History: h/o B hip and LBP w radiculopathy B LE, Down Syndrome    Subjective     Patient arrives in a good mood and excited about therapy.  She says she got a therapy ball for use at home.    CMS Impairment/Limitation/Restriction for FOTO Survey  Therapist reviewed FOTO scores for Arlin Daniels on 2023. FOTO documents entered into [employer] - see Media section.  Patient's Physical FS Primary Measure: 35 (43 on ) (54 on 10/17)  Risk Adjusted Statistical FOTO: 43  Limitation Score: 65% (57% on ) (46% on 10/17)  Category: Mobility  MDQ: 56% disability (44% on )    Objective     Eval 2023   Pain     8/10 with faces pain chart   Posture: patient has increased lumbar lordosis and obese abdomen     Gait Analysis: slight antalgic gait pattern at right LE, knee recurvatum preference      Myotomes     HIP FLX - Right 3/5, Left 4/5  HIP EXT - Right 3/5, Left 4/5     HIP ABD - Right 3/5, Left 4/5     KNEE FLX - Right 3/5, Left 4/5  KNEE EXT - Right 3/5, Left 4/5  ANKLE DF - Right 3/5, Left 4/5            Flexibility     HS = 60 B  Piriformis = min restr B         AROM     Normal range of motion at hips     Flexion: 100 B hips  Extension: 25 B hips               Special Tests     Jonatan Test: + B  90/90 Hamstrin B  Piriformis Test: min restr B         Functional Testing     5x STS = 20 sec     Balance     Tandem R  forward = 3 sec w max sway  Tandem L forward = 3 sec w max sway  SLS R = unable  SLS L = unable     TUG = 10 sec  TUG w water carry = 12 sec     2 MWT = 256'     Arlin received the following treatment:     Time Activities   Manual min    TherAct     TherEx* 60 min Core and LE and hip girdle stretching and strengthening, NuStep, balance training and core recruitment during functional movement   Gait     Neuro Re-ed     Modalities     E-Stim     Dry Needling     Canalith Repositioning           Home Exercises Provided and Patient Education Provided     Education provided:   -Plan of care, HEP, walking    Assessment     Exercise focus on functional core, low back and hip girdle recruitment and activity tolerance.  Increased reps and resistance today with good tolerance.      Issued HEP for swiss ball activity and TB activity at home to encourage increased activity.    Encouraged continued walking even if it's just for a few minutes at a time.    Discussed ways to slowly increase walking while respecting patient's limitations. Patient and family receptive.    Decrease therapy to once a week for discharge planning.    Patient prognosis is Good.      Anticipated barriers to physical therapy: cognitive limitations with behavioral dysfunction at times.    Goals: Arlin Is progressing well towards her goals.  Short Term Goals: 6 weeks   Patient will report at least 10% disability reduction from initial MDQ score to indicate clinically significant functional improvement  Patient will report at least 10 point increase on initial FOTO score to indicate clinically significant functional improvement  Patient will report 10% pain reduction     Long Term Goals: 12 weeks   Patient will report at least 20% disability reduction from initial MDQ score to indicate clinically significant functional improvement  Patient will report at least 20% increase on initial FOTO score to indicate clinically significant functional  improvement  Patient will report 50% overall perceived improvement  Patient will report ability to walk in grocery store without pain at B hips  Patient will demonstrate independence and compliance with HEP      Plan     2-3x/week x 12 weeks    Toma Dave, PT

## 2023-12-12 DIAGNOSIS — L02.91 ABSCESS: ICD-10-CM

## 2023-12-12 RX ORDER — MUPIROCIN 20 MG/G
OINTMENT TOPICAL 3 TIMES DAILY
Qty: 22 G | Refills: 1 | Status: SHIPPED | OUTPATIENT
Start: 2023-12-12 | End: 2024-02-08

## 2023-12-30 ENCOUNTER — HOSPITAL ENCOUNTER (EMERGENCY)
Facility: HOSPITAL | Age: 23
Discharge: HOME | End: 2023-12-30
Attending: STUDENT IN AN ORGANIZED HEALTH CARE EDUCATION/TRAINING PROGRAM
Payer: MEDICAID

## 2023-12-30 VITALS
HEIGHT: 58 IN | DIASTOLIC BLOOD PRESSURE: 77 MMHG | SYSTOLIC BLOOD PRESSURE: 109 MMHG | HEART RATE: 65 BPM | TEMPERATURE: 97 F | BODY MASS INDEX: 45.76 KG/M2 | WEIGHT: 218 LBS | RESPIRATION RATE: 18 BRPM | OXYGEN SATURATION: 100 %

## 2023-12-30 DIAGNOSIS — H61.22 IMPACTED CERUMEN OF LEFT EAR: ICD-10-CM

## 2023-12-30 DIAGNOSIS — R05.9 COUGH: ICD-10-CM

## 2023-12-30 DIAGNOSIS — J10.1 INFLUENZA A: Primary | ICD-10-CM

## 2023-12-30 LAB
ALBUMIN SERPL-MCNC: 3.6 G/DL (ref 3.5–5)
ALBUMIN/GLOB SERPL: 0.9 RATIO (ref 1.1–2)
ALP SERPL-CCNC: 79 UNIT/L (ref 40–150)
ALT SERPL-CCNC: 27 UNIT/L (ref 0–55)
APPEARANCE UR: CLEAR
AST SERPL-CCNC: 44 UNIT/L (ref 5–34)
B-HCG SERPL QL: NEGATIVE
BASOPHILS # BLD AUTO: 0.05 X10(3)/MCL
BASOPHILS NFR BLD AUTO: 1.3 %
BILIRUB SERPL-MCNC: 0.2 MG/DL
BILIRUB UR QL STRIP.AUTO: NEGATIVE
BUN SERPL-MCNC: 13 MG/DL (ref 7–18.7)
CALCIUM SERPL-MCNC: 9.4 MG/DL (ref 8.4–10.2)
CHLORIDE SERPL-SCNC: 106 MMOL/L (ref 98–107)
CK SERPL-CCNC: 807 U/L (ref 29–168)
CO2 SERPL-SCNC: 27 MMOL/L (ref 22–29)
COLOR UR AUTO: YELLOW
CREAT SERPL-MCNC: 1.05 MG/DL (ref 0.55–1.02)
EOSINOPHIL # BLD AUTO: 0.16 X10(3)/MCL (ref 0–0.9)
EOSINOPHIL NFR BLD AUTO: 4 %
ERYTHROCYTE [DISTWIDTH] IN BLOOD BY AUTOMATED COUNT: 14.6 % (ref 11.5–17)
FLUAV AG UPPER RESP QL IA.RAPID: DETECTED
FLUBV AG UPPER RESP QL IA.RAPID: NOT DETECTED
GFR SERPLBLD CREATININE-BSD FMLA CKD-EPI: >60 MLS/MIN/1.73/M2
GLOBULIN SER-MCNC: 4.2 GM/DL (ref 2.4–3.5)
GLUCOSE SERPL-MCNC: 80 MG/DL (ref 74–100)
GLUCOSE UR QL STRIP.AUTO: NEGATIVE
HCT VFR BLD AUTO: 43.6 % (ref 37–47)
HGB BLD-MCNC: 14.1 G/DL (ref 12–16)
IMM GRANULOCYTES # BLD AUTO: 0.01 X10(3)/MCL (ref 0–0.04)
IMM GRANULOCYTES NFR BLD AUTO: 0.3 %
KETONES UR QL STRIP.AUTO: ABNORMAL
LEUKOCYTE ESTERASE UR QL STRIP.AUTO: NEGATIVE
LYMPHOCYTES # BLD AUTO: 1.22 X10(3)/MCL (ref 0.6–4.6)
LYMPHOCYTES NFR BLD AUTO: 30.6 %
MCH RBC QN AUTO: 31.5 PG (ref 27–31)
MCHC RBC AUTO-ENTMCNC: 32.3 G/DL (ref 33–36)
MCV RBC AUTO: 97.3 FL (ref 80–94)
MONOCYTES # BLD AUTO: 0.37 X10(3)/MCL (ref 0.1–1.3)
MONOCYTES NFR BLD AUTO: 9.3 %
NEUTROPHILS # BLD AUTO: 2.18 X10(3)/MCL (ref 2.1–9.2)
NEUTROPHILS NFR BLD AUTO: 54.5 %
NITRITE UR QL STRIP.AUTO: NEGATIVE
NRBC BLD AUTO-RTO: 0 %
PH UR STRIP.AUTO: 6.5 [PH]
PLATELET # BLD AUTO: 198 X10(3)/MCL (ref 130–400)
PMV BLD AUTO: 10 FL (ref 7.4–10.4)
POTASSIUM SERPL-SCNC: 4 MMOL/L (ref 3.5–5.1)
PROT SERPL-MCNC: 7.8 GM/DL (ref 6.4–8.3)
PROT UR QL STRIP.AUTO: NEGATIVE
RBC # BLD AUTO: 4.48 X10(6)/MCL (ref 4.2–5.4)
RBC UR QL AUTO: NEGATIVE
SARS-COV-2 RNA RESP QL NAA+PROBE: NOT DETECTED
SODIUM SERPL-SCNC: 143 MMOL/L (ref 136–145)
SP GR UR STRIP.AUTO: 1.02 (ref 1–1.03)
UROBILINOGEN UR STRIP-ACNC: 0.2
WBC # SPEC AUTO: 3.99 X10(3)/MCL (ref 4.5–11.5)

## 2023-12-30 PROCEDURE — 82550 ASSAY OF CK (CPK): CPT | Performed by: STUDENT IN AN ORGANIZED HEALTH CARE EDUCATION/TRAINING PROGRAM

## 2023-12-30 PROCEDURE — 81025 URINE PREGNANCY TEST: CPT | Performed by: STUDENT IN AN ORGANIZED HEALTH CARE EDUCATION/TRAINING PROGRAM

## 2023-12-30 PROCEDURE — 96360 HYDRATION IV INFUSION INIT: CPT

## 2023-12-30 PROCEDURE — 85025 COMPLETE CBC W/AUTO DIFF WBC: CPT | Performed by: STUDENT IN AN ORGANIZED HEALTH CARE EDUCATION/TRAINING PROGRAM

## 2023-12-30 PROCEDURE — 63600175 PHARM REV CODE 636 W HCPCS: Performed by: STUDENT IN AN ORGANIZED HEALTH CARE EDUCATION/TRAINING PROGRAM

## 2023-12-30 PROCEDURE — 0240U COVID/FLU A&B PCR: CPT | Performed by: STUDENT IN AN ORGANIZED HEALTH CARE EDUCATION/TRAINING PROGRAM

## 2023-12-30 PROCEDURE — 81003 URINALYSIS AUTO W/O SCOPE: CPT | Performed by: STUDENT IN AN ORGANIZED HEALTH CARE EDUCATION/TRAINING PROGRAM

## 2023-12-30 PROCEDURE — 99284 EMERGENCY DEPT VISIT MOD MDM: CPT | Mod: 25

## 2023-12-30 PROCEDURE — 80053 COMPREHEN METABOLIC PANEL: CPT | Performed by: STUDENT IN AN ORGANIZED HEALTH CARE EDUCATION/TRAINING PROGRAM

## 2023-12-30 RX ORDER — OSELTAMIVIR PHOSPHATE 75 MG/1
75 CAPSULE ORAL 2 TIMES DAILY
Qty: 10 CAPSULE | Refills: 0 | Status: SHIPPED | OUTPATIENT
Start: 2023-12-30 | End: 2024-01-04

## 2023-12-30 RX ADMIN — SODIUM CHLORIDE, POTASSIUM CHLORIDE, SODIUM LACTATE AND CALCIUM CHLORIDE 1000 ML: 600; 310; 30; 20 INJECTION, SOLUTION INTRAVENOUS at 06:12

## 2023-12-30 NOTE — ED PROVIDER NOTES
Encounter Date: 12/30/2023       History     Chief Complaint   Patient presents with    Vomiting    Cough     Vomiting, coughing, body aches, congestion x 3 days.       22yo female past medical history down syndrome, heart valve insufficiency, hypothyroid presents with mom for vomiting, cough. Has been seen at urgent care twice tested for COVID/Flu 3 days ago and then again yesterday both times tested negative. Mom states cough, body aches and congestion for 3 days. Reports one episode of vomiting last night. Patient complaining of shortness of breath, cough, sore throat. Family members sick with Flu.       Review of patient's allergies indicates:  No Known Allergies  Past Medical History:   Diagnosis Date    Down syndrome     Heart valve insufficiency     Hypothyroidism      Past Surgical History:   Procedure Laterality Date    TUMOR REMOVAL       History reviewed. No pertinent family history.  Social History     Tobacco Use    Smoking status: Never    Smokeless tobacco: Never   Substance Use Topics    Alcohol use: Not Currently    Drug use: Not Currently     Review of Systems   Constitutional:  Positive for activity change. Negative for fever.   HENT:  Positive for congestion and sore throat.    Respiratory:  Positive for cough and shortness of breath.    Cardiovascular:  Negative for chest pain.   Gastrointestinal:  Positive for vomiting. Negative for constipation, diarrhea and nausea.   Genitourinary:  Negative for dysuria.   Musculoskeletal:  Negative for back pain.   Skin:  Negative for rash.   Neurological:  Negative for weakness.   Hematological:  Does not bruise/bleed easily.       Physical Exam     Initial Vitals [12/30/23 1734]   BP Pulse Resp Temp SpO2   124/85 85 20 96.4 °F (35.8 °C) 99 %      MAP       --         Physical Exam    Vitals reviewed.  Constitutional: She appears well-developed and well-nourished. She has a sickly appearance.   HENT:   Head: Normocephalic and atraumatic.   Right Ear:  Hearing, tympanic membrane, external ear and ear canal normal.   Left Ear: External ear normal.   Nose: Nose normal.   Left ear cerumen impaction   Eyes: Conjunctivae are normal.   Neck: Neck supple.   Cardiovascular:  Normal rate and regular rhythm.           Pulmonary/Chest: Breath sounds normal. No respiratory distress. She has no rhonchi. She has no rales.   Abdominal: Abdomen is soft. Bowel sounds are normal. There is no abdominal tenderness.   Musculoskeletal:         General: Normal range of motion.      Cervical back: Neck supple.     Neurological: She is alert.   Skin: Skin is warm and dry.         ED Course   Procedures  Labs Reviewed   URINALYSIS, REFLEX TO URINE CULTURE - Abnormal; Notable for the following components:       Result Value    Ketones, UA Trace (*)     All other components within normal limits   COMPREHENSIVE METABOLIC PANEL - Abnormal; Notable for the following components:    Creatinine 1.05 (*)     Globulin 4.2 (*)     Albumin/Globulin Ratio 0.9 (*)     Aspartate Aminotransferase 44 (*)     All other components within normal limits   CK - Abnormal; Notable for the following components:    Creatine Kinase 807 (*)     All other components within normal limits   CBC WITH DIFFERENTIAL - Abnormal; Notable for the following components:    WBC 3.99 (*)     MCV 97.3 (*)     MCH 31.5 (*)     MCHC 32.3 (*)     All other components within normal limits   COVID/FLU A&B PCR - Abnormal; Notable for the following components:    Influenza A PCR Detected (*)     All other components within normal limits    Narrative:     The Xpert Xpress SARS-CoV-2/FLU/RSV plus is a rapid, multiplexed real-time PCR test intended for the simultaneous qualitative detection and differentiation of SARS-CoV-2, Influenza A, Influenza B, and respiratory syncytial virus (RSV) viral RNA in either nasopharyngeal swab or nasal swab specimens.         PREGNANCY TEST, URINE RAPID - Normal   CBC W/ AUTO DIFFERENTIAL    Narrative:      The following orders were created for panel order CBC auto differential.  Procedure                               Abnormality         Status                     ---------                               -----------         ------                     CBC with Differential[[id number]]       Abnormal            Final result                 Please view results for these tests on the individual orders.          Imaging Results              X-Ray Chest AP Portable (In process)                      Medications   lactated ringers bolus 1,000 mL (1,000 mLs Intravenous New Bag 12/30/23 1817)     Medical Decision Making  22 yo with cough, congestion. Differential includes viral syndrome, PNA, COVID, Flu. Attempted cerumen disimpaction, patient uncomfortable with procedure. Advised mom to use debrox drops and follow up with PCP, avoid q-tips. Flu A positive will send Tamiflu script. Advised to keep follow up with PCP. Return precautions given.     Amount and/or Complexity of Data Reviewed  Labs: ordered.                                      Clinical Impression:  Final diagnoses:  [R05.9] Cough  [H61.22] Impacted cerumen of left ear  [J10.1] Influenza A (Primary)          ED Disposition Condition    Discharge Stable          ED Prescriptions       Medication Sig Dispense Start Date End Date Auth. Provider    oseltamivir (TAMIFLU) 75 MG capsule Take 1 capsule (75 mg total) by mouth 2 (two) times daily. for 5 days 10 capsule 12/30/2023 1/4/2024 Pamela Moreno, DO          Follow-up Information       Follow up With Specialties Details Why Contact Info    Ferny Whiting MD Family Medicine On 1/2/2024 keep appointment [address] [facility] [address]  [phone]      [facility] Emergency Dept Emergency Medicine  If symptoms worsen, As needed [address] [place] [id number] [phone]             Pamela Moreno,   12/30/23 4773

## 2023-12-30 NOTE — ED NOTES
Pts mother states pt has had a cough, congestion, fever, and body aches for the past 3 days. Pt vomited last night, 5 episodes. Denies blood in vomit. Mother is giving pt Tylenol for fever, last dose was this morning around 9 am. Pt has been tested twice at urgent cares for flu and it was negative twice.

## 2024-01-02 ENCOUNTER — OFFICE VISIT (OUTPATIENT)
Dept: FAMILY MEDICINE | Facility: CLINIC | Age: 24
End: 2024-01-02
Payer: MEDICAID

## 2024-01-02 VITALS
OXYGEN SATURATION: 85 % | SYSTOLIC BLOOD PRESSURE: 134 MMHG | BODY MASS INDEX: 46.81 KG/M2 | TEMPERATURE: 97 F | HEART RATE: 98 BPM | HEIGHT: 58 IN | WEIGHT: 223 LBS | RESPIRATION RATE: 18 BRPM | DIASTOLIC BLOOD PRESSURE: 80 MMHG

## 2024-01-02 DIAGNOSIS — F32.A DEPRESSION, UNSPECIFIED DEPRESSION TYPE: ICD-10-CM

## 2024-01-02 DIAGNOSIS — E11.9 TYPE 2 DIABETES MELLITUS WITHOUT COMPLICATION, WITHOUT LONG-TERM CURRENT USE OF INSULIN: ICD-10-CM

## 2024-01-02 DIAGNOSIS — H61.22 HEARING LOSS OF LEFT EAR DUE TO CERUMEN IMPACTION: ICD-10-CM

## 2024-01-02 DIAGNOSIS — J10.1 INFLUENZA A: Primary | ICD-10-CM

## 2024-01-02 PROCEDURE — 3079F DIAST BP 80-89 MM HG: CPT | Mod: CPTII,,, | Performed by: FAMILY MEDICINE

## 2024-01-02 PROCEDURE — 3008F BODY MASS INDEX DOCD: CPT | Mod: CPTII,,, | Performed by: FAMILY MEDICINE

## 2024-01-02 PROCEDURE — 99214 OFFICE O/P EST MOD 30 MIN: CPT | Mod: 25,,, | Performed by: FAMILY MEDICINE

## 2024-01-02 PROCEDURE — 1159F MED LIST DOCD IN RCRD: CPT | Mod: CPTII,,, | Performed by: FAMILY MEDICINE

## 2024-01-02 PROCEDURE — 3075F SYST BP GE 130 - 139MM HG: CPT | Mod: CPTII,,, | Performed by: FAMILY MEDICINE

## 2024-01-02 PROCEDURE — 1160F RVW MEDS BY RX/DR IN RCRD: CPT | Mod: CPTII,,, | Performed by: FAMILY MEDICINE

## 2024-01-02 PROCEDURE — 69210 REMOVE IMPACTED EAR WAX UNI: CPT | Mod: ,,, | Performed by: FAMILY MEDICINE

## 2024-01-02 RX ORDER — ALBUTEROL SULFATE 1.25 MG/3ML
1.25 SOLUTION RESPIRATORY (INHALATION) EVERY 6 HOURS PRN
Qty: 75 ML | Refills: 1 | Status: SHIPPED | OUTPATIENT
Start: 2024-01-02

## 2024-01-02 NOTE — PROGRESS NOTES
"Subjective:       Patient ID: Arlin Daniels is a 23 y.o. female.    Chief Complaint: 6 month follow up and FLU + X 3 DAYS, COUGH, CONGESTION,FEVER;      Routine    Diabetes  - tolerating medication (no side-effects)  - DXT: 170's  - watching diet    Review of Systems   Constitutional:  Positive for fever.        Recently tested + for Flu A, currently on Tamiflu   HENT:  Positive for nasal congestion, hearing loss (left), rhinorrhea and sore throat.    Respiratory:  Positive for cough (productive).    Cardiovascular: Negative.    Gastrointestinal: Negative.    Psychiatric/Behavioral: Negative.             Objective:      /80 (BP Location: Left arm, Patient Position: Sitting, BP Method: Large (Manual))   Pulse 98   Temp 97.4 °F (36.3 °C)   Resp 18   Ht 4' 10" (1.473 m)   Wt 101.2 kg (223 lb)   LMP 12/21/2023   SpO2 (!) 85%   BMI 46.61 kg/m²    Physical Exam  Constitutional:       Appearance: Normal appearance.   HENT:      Right Ear: Tympanic membrane and ear canal normal.      Left Ear: There is impacted cerumen.   Cardiovascular:      Rate and Rhythm: Normal rate and regular rhythm.      Heart sounds: Normal heart sounds.   Pulmonary:      Effort: Pulmonary effort is normal.      Breath sounds: Normal breath sounds.   Neurological:      Mental Status: She is alert.   Psychiatric:         Mood and Affect: Mood normal.         Behavior: Behavior normal.         Thought Content: Thought content normal.         Judgment: Judgment normal.               Assessment:       Problem List Items Addressed This Visit          Psychiatric    Depression       Endocrine    Diabetes mellitus     Other Visit Diagnoses       Influenza A    -  Primary    Relevant Medications    albuterol (ACCUNEB) 1.25 mg/3 mL Nebu    Hearing loss of left ear due to cerumen impaction                   Plan:   1. Influenza A  -     albuterol (ACCUNEB) 1.25 mg/3 mL Nebu; Take 3 mLs (1.25 mg total) by nebulization every 6 (six) hours as " needed (wheezing).  Dispense: 75 mL; Refill: 1    2. Type 2 diabetes mellitus without complication, without long-term current use of insulin  Controlled  Continue current Rx medication  Return to clinic with any concerns    3. Depression, unspecified depression type  Controlled  Continue current Rx medication  Return to clinic with any concerns    4. Hearing loss of left ear due to cerumen impaction  Cerumen Impaction left  *Procedure Note*  Posterior traction applied on helix  Bionix lighted ear currette placed in ear canal  Cerumen impaction removed  Ear canal re-examined  Canal normal  Tympanic membrane clear

## 2024-01-07 ENCOUNTER — HOSPITAL ENCOUNTER (EMERGENCY)
Facility: HOSPITAL | Age: 24
Discharge: HOME | End: 2024-01-07
Attending: EMERGENCY MEDICINE
Payer: MEDICAID

## 2024-01-07 VITALS
WEIGHT: 218 LBS | OXYGEN SATURATION: 98 % | SYSTOLIC BLOOD PRESSURE: 133 MMHG | RESPIRATION RATE: 18 BRPM | BODY MASS INDEX: 45.76 KG/M2 | HEART RATE: 88 BPM | HEIGHT: 58 IN | DIASTOLIC BLOOD PRESSURE: 88 MMHG | TEMPERATURE: 99 F

## 2024-01-07 DIAGNOSIS — S93.402A SPRAIN OF LEFT ANKLE, UNSPECIFIED LIGAMENT, INITIAL ENCOUNTER: Primary | ICD-10-CM

## 2024-01-07 DIAGNOSIS — S93.409A ANKLE SPRAIN: ICD-10-CM

## 2024-01-07 PROCEDURE — 99283 EMERGENCY DEPT VISIT LOW MDM: CPT

## 2024-01-08 NOTE — ED PROVIDER NOTES
Encounter Date: 1/7/2024       History     Chief Complaint   Patient presents with    Ankle Pain     Left ankle pain after fall last night     Patient is a 23-year-old female presenting with mother.  Mother states patient twisted her left ankle last night.  She states that patient has been walking on it but she is complaining of some pain.  No other injury reported.      Review of patient's allergies indicates:  No Known Allergies  Past Medical History:   Diagnosis Date    Down syndrome     Heart valve insufficiency     Hypothyroidism      Past Surgical History:   Procedure Laterality Date    TUMOR REMOVAL       No family history on file.  Social History     Tobacco Use    Smoking status: Never    Smokeless tobacco: Never   Substance Use Topics    Alcohol use: Not Currently    Drug use: Not Currently     Review of Systems   Constitutional:  Negative for chills and fever.   Respiratory: Negative.     Cardiovascular: Negative.    Gastrointestinal: Negative.    Musculoskeletal:  Positive for arthralgias. Negative for back pain and neck pain.   Skin: Negative.    Neurological: Negative.    Psychiatric/Behavioral: Negative.         Physical Exam     Initial Vitals [01/07/24 1949]   BP Pulse Resp Temp SpO2   (!) 146/91 93 16 98.6 °F (37 °C) 98 %      MAP       --         Physical Exam    Nursing note and vitals reviewed.  Constitutional: She appears well-developed and well-nourished.   Neck: Neck supple.   Normal range of motion.  Cardiovascular:  Normal rate, regular rhythm and normal heart sounds.           Pulmonary/Chest: Breath sounds normal. No respiratory distress.   Musculoskeletal:         General: Normal range of motion.      Cervical back: Normal range of motion and neck supple.      Comments: Patient has mild swelling to the left medial and lateral malleolus area with some localized tenderness to palpation.  No deformity.  Patient has a very slight antalgic gait.     Skin: Skin is warm and dry.   Psychiatric:  She has a normal mood and affect.         ED Course   Procedures  Labs Reviewed - No data to display       Imaging Results              X-Ray Ankle Complete Left (In process)  Result time 01/07/24 20:00:04                  X-Rays:   Independently Interpreted Readings:   Other Readings:  X-rays of the left ankle are negative for fracture    Medications - No data to display  Medical Decision Making  Differential diagnosis: Ankle sprain, fibular fracture, tibial fracture    Amount and/or Complexity of Data Reviewed  Radiology: ordered. Decision-making details documented in ED Course.  Discussion of management or test interpretation with external provider(s): X-rays are negative fracture.  Will DC to home                                      Clinical Impression:  Final diagnoses:  [S93.409A] Ankle sprain  [S93.402A] Sprain of left ankle, unspecified ligament, initial encounter (Primary)          ED Disposition Condition    Discharge Stable          ED Prescriptions    None       Follow-up Information       Follow up With Specialties Details Why Contact Info    [facility] - Emergency Dept Emergency Medicine  As needed, If symptoms worsen [address] [place] [id number]  [phone]             Neal Tijerina MD  01/07/24 2004

## 2024-01-10 DIAGNOSIS — F32.A DEPRESSION, UNSPECIFIED DEPRESSION TYPE: ICD-10-CM

## 2024-01-10 RX ORDER — ARIPIPRAZOLE 2 MG/1
2 TABLET ORAL
Qty: 30 TABLET | Refills: 1 | Status: SHIPPED | OUTPATIENT
Start: 2024-01-10

## 2024-01-17 ENCOUNTER — OFFICE VISIT (OUTPATIENT)
Dept: FAMILY MEDICINE | Facility: CLINIC | Age: 24
End: 2024-01-17
Payer: MEDICAID

## 2024-01-17 DIAGNOSIS — L02.92 BOIL: Primary | ICD-10-CM

## 2024-01-17 PROCEDURE — 1159F MED LIST DOCD IN RCRD: CPT | Mod: CPTII,95,, | Performed by: FAMILY MEDICINE

## 2024-01-17 PROCEDURE — 99214 OFFICE O/P EST MOD 30 MIN: CPT | Mod: 95,,, | Performed by: FAMILY MEDICINE

## 2024-01-17 PROCEDURE — 1160F RVW MEDS BY RX/DR IN RCRD: CPT | Mod: CPTII,95,, | Performed by: FAMILY MEDICINE

## 2024-01-17 RX ORDER — SULFAMETHOXAZOLE AND TRIMETHOPRIM 800; 160 MG/1; MG/1
1 TABLET ORAL 2 TIMES DAILY
Qty: 14 TABLET | Refills: 0 | Status: SHIPPED | OUTPATIENT
Start: 2024-01-17 | End: 2024-01-24

## 2024-01-17 NOTE — PROGRESS NOTES
Subjective:       Patient ID: Arlin Daniels is a 23 y.o. female.    Chief Complaint: No chief complaint on file.      Boil        Review of Systems   Constitutional:  Negative for activity change and unexpected weight change.   HENT:  Negative for hearing loss, rhinorrhea and trouble swallowing.    Eyes:  Negative for discharge and visual disturbance.   Respiratory:  Negative for chest tightness and wheezing.    Cardiovascular:  Negative for chest pain and palpitations.   Gastrointestinal:  Negative for blood in stool, constipation, diarrhea and vomiting.   Endocrine: Negative for polydipsia and polyuria.   Genitourinary:  Negative for difficulty urinating, dysuria, hematuria and menstrual problem.   Musculoskeletal:  Negative for arthralgias, joint swelling and neck pain.   Integumentary:         Left thigh: boil, draining, elevated CBG's, increased pain   Neurological:  Negative for weakness and headaches.   Psychiatric/Behavioral:  Negative for confusion and dysphoric mood.            Objective:      LMP 12/21/2023    Physical Exam  General:  Alert oriented  Skin:  Abscess noted on left inner thigh, mild erythema  Neurologic:  Alert, oriented  Psychiatric:  Cooperative, appropriate mood and affect, normal judgment      The patient location is: [place]  The chief complaint leading to consultation is: boil    Visit type: audiovisual    Face to Face time with patient: 15 minutes of total time spent on the encounter, which includes face to face time and non-face to face time preparing to see the patient (eg, review of tests), Obtaining and/or reviewing separately obtained history, Documenting clinical information in the electronic or other health record, Independently interpreting results (not separately reported) and communicating results to the patient/family/caregiver, or Care coordination (not separately reported).         Each patient to whom he or she provides medical services by telemedicine is:  (1)  informed of the relationship between the physician and patient and the respective role of any other health care provider with respect to management of the patient; and (2) notified that he or she may decline to receive medical services by telemedicine and may withdraw from such care at any time.          Assessment:       Problem List Items Addressed This Visit    None  Visit Diagnoses       Boil    -  Primary    Relevant Medications    sulfamethoxazole-trimethoprim 800-160mg (BACTRIM DS) 800-160 mg Tab               Plan:   1. Boil  -     sulfamethoxazole-trimethoprim 800-160mg (BACTRIM DS) 800-160 mg Tab; Take 1 tablet by mouth 2 (two) times daily. for 7 days  Dispense: 14 tablet; Refill: 0  Rx for Bactrim DS b.i.d. x1 week   Wound care discussed   Monitor  Office visit with any concerns

## 2024-02-08 DIAGNOSIS — L02.91 ABSCESS: ICD-10-CM

## 2024-02-08 DIAGNOSIS — T78.40XD ALLERGY, SUBSEQUENT ENCOUNTER: ICD-10-CM

## 2024-02-08 RX ORDER — MUPIROCIN 20 MG/G
OINTMENT TOPICAL 2 TIMES DAILY
Qty: 22 G | Refills: 1 | Status: SHIPPED | OUTPATIENT
Start: 2024-02-08 | End: 2024-04-05 | Stop reason: SDUPTHER

## 2024-02-08 RX ORDER — FLUTICASONE PROPIONATE 50 MCG
SPRAY, SUSPENSION (ML) NASAL
Qty: 16 G | Refills: 3 | Status: SHIPPED | OUTPATIENT
Start: 2024-02-08

## 2024-03-09 DIAGNOSIS — F32.A DEPRESSION, UNSPECIFIED DEPRESSION TYPE: ICD-10-CM

## 2024-03-11 RX ORDER — ARIPIPRAZOLE 2 MG/1
2 TABLET ORAL
Qty: 30 TABLET | Refills: 1 | Status: SHIPPED | OUTPATIENT
Start: 2024-03-11 | End: 2024-06-10

## 2024-03-18 DIAGNOSIS — F32.A DEPRESSION, UNSPECIFIED DEPRESSION TYPE: ICD-10-CM

## 2024-03-18 RX ORDER — DULOXETIN HYDROCHLORIDE 60 MG/1
CAPSULE, DELAYED RELEASE ORAL
Qty: 30 CAPSULE | Refills: 3 | Status: SHIPPED | OUTPATIENT
Start: 2024-03-18

## 2024-04-05 DIAGNOSIS — L02.91 ABSCESS: ICD-10-CM

## 2024-04-05 RX ORDER — MUPIROCIN 20 MG/G
OINTMENT TOPICAL 2 TIMES DAILY
Qty: 22 G | Refills: 1 | Status: SHIPPED | OUTPATIENT
Start: 2024-04-05

## 2024-04-08 NOTE — PLAN OF CARE
Physical Therapy Treatment Note     Name: Arlin Daniels  Clinic Number: [id number]    Therapy Diagnosis:   Encounter Diagnosis   Name Primary?    Lumbar radiculopathy Yes     Physician: Ferny Whiting MD    Visit Date: 10/5/2023    Physician Orders: PT Eval and Treat  Medical Diagnosis from Referral: lumbar pain with radiculopathy  Evaluation Date: 2023  Authorization Period Expiration: medically necessary  Plan of Care Expiration: 2023  Visit # / Visits authorized: 9/medically necessary     Time In: 250  Time Out: 345  Total Billable Time: 55 minutes    Pertinent History: h/o B hip and LBP w radiculopathy B LE, Down Syndrome    Subjective     Patient reports: she had a long day with visiting grandfather at the hospital.  She is tired and not very motivated.    CMS Impairment/Limitation/Restriction for FOTO Survey  Therapist reviewed FOTO scores for Arlin Daniels on 2023. FOTO documents entered into [employer] - see Media section.  Patient's Physical FS Primary Measure: 35 (43 on )  Risk Adjusted Statistical FOTO: 43  Limitation Score: 65% (57% on )  Category: Mobility  MDQ: 56% disability (44% on )    Objective     Eval 2023   Pain     8/10 with faces pain chart   Posture: patient has increased lumbar lordosis and obese abdomen     Gait Analysis: slight antalgic gait pattern at right LE, knee recurvatum preference      Myotomes     HIP FLX - Right 3/5, Left 4/5  HIP EXT - Right 3/5, Left 4/5     HIP ABD - Right 3/5, Left 4/5     KNEE FLX - Right 3/5, Left 4/5  KNEE EXT - Right 3/5, Left 4/5  ANKLE DF - Right 3/5, Left 4/5            Flexibility     HS = 60 B  Piriformis = min restr B         AROM     Normal range of motion at hips     Flexion: 100 B hips  Extension: 25 B hips               Special Tests     Jonatan Test: + B  90/90 Hamstrin B  Piriformis Test: min restr B         Functional Testing     5x STS = 20 sec     Balance     Tandem R forward = 3 sec w max sway  Tandem  L forward = 3 sec w max sway  SLS R = unable  SLS L = unable     TUG = 10 sec  TUG w water carry = 12 sec     2 MWT = 256'     Arlin received the following treatment:     Time Activities   Manual min    TherAct     TherEx* 55 min Core and LE and hip girdle stretching and strengthening, NuStep, balance training and core recruitment during functional movement   Gait     Neuro Re-ed     Modalities     E-Stim     Dry Needling     Canalith Repositioning           Home Exercises Provided and Patient Education Provided     Education provided:   -Plan of care, HEP, walking    Assessment     Exercise focus on functional core, low back and hip girdle recruitment and activity tolerance.  Held exercise due to fatigue and reluctance again day.      Encouraged continued walking even if it's just for a few minutes at a time.    Discussed ways to slowly increase walking while respecting patient's limitations. Patient and family receptive.    Feel patient will benefit from therapy for progressive core and hip girdle and LE strengthening and improved activity tolerance.    Patient prognosis is Good.      Anticipated barriers to physical therapy: cognitive limitations with behavioral dysfunction at times.    Goals: Arlin Is progressing well towards her goals.  Short Term Goals: 6 weeks   Patient will report at least 10% disability reduction from initial MDQ score to indicate clinically significant functional improvement  Patient will report at least 10 point increase on initial FOTO score to indicate clinically significant functional improvement  Patient will report 10% pain reduction     Long Term Goals: 12 weeks   Patient will report at least 20% disability reduction from initial MDQ score to indicate clinically significant functional improvement  Patient will report at least 20% increase on initial FOTO score to indicate clinically significant functional improvement  Patient will report 50% overall perceived improvement  Patient  will report ability to walk in grocery store without pain at B hips  Patient will demonstrate independence and compliance with HEP      Plan     2-3x/week x 12 weeks    Toma Dave, PT     08-Apr-2024 22:07

## 2024-04-22 ENCOUNTER — OFFICE VISIT (OUTPATIENT)
Dept: FAMILY MEDICINE | Facility: CLINIC | Age: 24
End: 2024-04-22
Payer: MEDICAID

## 2024-04-22 VITALS — HEIGHT: 58 IN | BODY MASS INDEX: 45.76 KG/M2 | WEIGHT: 218 LBS

## 2024-04-22 DIAGNOSIS — N61.0 MASTITIS: Primary | ICD-10-CM

## 2024-04-22 PROCEDURE — 3008F BODY MASS INDEX DOCD: CPT | Mod: CPTII,95,, | Performed by: FAMILY MEDICINE

## 2024-04-22 PROCEDURE — 1160F RVW MEDS BY RX/DR IN RCRD: CPT | Mod: CPTII,95,, | Performed by: FAMILY MEDICINE

## 2024-04-22 PROCEDURE — 1159F MED LIST DOCD IN RCRD: CPT | Mod: CPTII,95,, | Performed by: FAMILY MEDICINE

## 2024-04-22 PROCEDURE — 99214 OFFICE O/P EST MOD 30 MIN: CPT | Mod: 95,,, | Performed by: FAMILY MEDICINE

## 2024-04-22 PROCEDURE — 3051F HG A1C>EQUAL 7.0%<8.0%: CPT | Mod: CPTII,95,, | Performed by: FAMILY MEDICINE

## 2024-04-22 RX ORDER — CLINDAMYCIN HYDROCHLORIDE 300 MG/1
300 CAPSULE ORAL 3 TIMES DAILY
Qty: 30 CAPSULE | Refills: 0 | Status: SHIPPED | OUTPATIENT
Start: 2024-04-22 | End: 2024-05-02

## 2024-04-22 NOTE — PROGRESS NOTES
"Subjective:       Patient ID: Arlin Daniels is a 24 y.o. female.    Chief Complaint: large lump on breast- ruptured last night, painful      Boil        Review of Systems   Constitutional:  Negative for activity change and unexpected weight change.   HENT:  Negative for hearing loss, rhinorrhea and trouble swallowing.    Eyes:  Negative for discharge and visual disturbance.   Respiratory:  Negative for chest tightness and wheezing.    Cardiovascular:  Negative for chest pain and palpitations.   Gastrointestinal:  Negative for blood in stool, constipation, diarrhea and vomiting.   Endocrine: Negative for polydipsia and polyuria.   Genitourinary:  Negative for difficulty urinating, dysuria, hematuria and menstrual problem.   Musculoskeletal:  Negative for arthralgias, joint swelling and neck pain.   Integumentary:         Boil: located on left breast, present x 2 days, draining, using mupirocin cream   Neurological:  Negative for weakness and headaches.   Psychiatric/Behavioral:  Negative for confusion and dysphoric mood.            Objective:      Ht 4' 10" (1.473 m)   Wt 98.9 kg (218 lb)   LMP  (LMP Unknown)   BMI 45.56 kg/m²    Physical Exam  General:  Alert oriented  Neurologic:  Alert, oriented  Skin: Left medial/lower breast with erythema, drainage noted  Psychiatric:  Cooperative, appropriate mood and affect, normal judgment      The patient location is: [place]  The chief complaint leading to consultation is: mastitis    Visit type: audiovisual    Face to Face time with patient: 15 minutes of total time spent on the encounter, which includes face to face time and non-face to face time preparing to see the patient (eg, review of tests), Obtaining and/or reviewing separately obtained history, Documenting clinical information in the electronic or other health record, Independently interpreting results (not separately reported) and communicating results to the patient/family/caregiver, or Care coordination " (not separately reported).         Each patient to whom he or she provides medical services by telemedicine is:  (1) informed of the relationship between the physician and patient and the respective role of any other health care provider with respect to management of the patient; and (2) notified that he or she may decline to receive medical services by telemedicine and may withdraw from such care at any time.          Assessment:       Problem List Items Addressed This Visit    None  Visit Diagnoses       Mastitis    -  Primary    Relevant Medications    clindamycin (CLEOCIN) 300 MG capsule               Plan:   1. Mastitis  -     clindamycin (CLEOCIN) 300 MG capsule; Take 1 capsule (300 mg total) by mouth 3 (three) times daily. for 10 days  Dispense: 30 capsule; Refill: 0  Rx for clindamycin 300 mg t.i.d. times 10 days  Keep area clean/dry  Monitor  Office visit with any concerns

## 2024-06-07 DIAGNOSIS — F32.A DEPRESSION, UNSPECIFIED DEPRESSION TYPE: ICD-10-CM

## 2024-06-10 RX ORDER — ARIPIPRAZOLE 2 MG/1
2 TABLET ORAL
Qty: 30 TABLET | Refills: 1 | Status: SHIPPED | OUTPATIENT
Start: 2024-06-10

## 2024-07-08 DIAGNOSIS — F32.A DEPRESSION, UNSPECIFIED DEPRESSION TYPE: ICD-10-CM

## 2024-07-08 RX ORDER — ARIPIPRAZOLE 2 MG/1
2 TABLET ORAL
Qty: 30 TABLET | Refills: 1 | Status: SHIPPED | OUTPATIENT
Start: 2024-07-08

## 2024-08-09 DIAGNOSIS — T78.40XD ALLERGY, SUBSEQUENT ENCOUNTER: ICD-10-CM

## 2024-08-09 DIAGNOSIS — L02.91 ABSCESS: ICD-10-CM

## 2024-08-09 RX ORDER — MUPIROCIN 20 MG/G
OINTMENT TOPICAL 3 TIMES DAILY
Qty: 22 G | Refills: 1 | Status: SHIPPED | OUTPATIENT
Start: 2024-08-09

## 2024-08-09 RX ORDER — FLUTICASONE PROPIONATE 50 MCG
SPRAY, SUSPENSION (ML) NASAL
Qty: 16 G | Refills: 3 | Status: SHIPPED | OUTPATIENT
Start: 2024-08-09

## 2024-08-22 ENCOUNTER — TELEPHONE (OUTPATIENT)
Dept: FAMILY MEDICINE | Facility: CLINIC | Age: 24
End: 2024-08-22
Payer: MEDICAID

## 2024-08-22 DIAGNOSIS — F32.A DEPRESSION, UNSPECIFIED DEPRESSION TYPE: ICD-10-CM

## 2024-08-22 RX ORDER — DULOXETIN HYDROCHLORIDE 60 MG/1
CAPSULE, DELAYED RELEASE ORAL
Qty: 30 CAPSULE | Refills: 11 | Status: SHIPPED | OUTPATIENT
Start: 2024-08-22

## 2024-08-22 RX ORDER — ARIPIPRAZOLE 2 MG/1
2 TABLET ORAL DAILY
Qty: 30 TABLET | Refills: 11 | Status: SHIPPED | OUTPATIENT
Start: 2024-08-22

## 2024-08-22 NOTE — TELEPHONE ENCOUNTER
----- Message from France Segura sent at 8/22/2024  3:10 PM CDT -----  Regarding: med refill  Meloxicam    [address] [facility]

## 2024-08-23 ENCOUNTER — OFFICE VISIT (OUTPATIENT)
Dept: FAMILY MEDICINE | Facility: CLINIC | Age: 24
End: 2024-08-23
Payer: MEDICAID

## 2024-08-23 VITALS — BODY MASS INDEX: 46.18 KG/M2 | HEIGHT: 58 IN | WEIGHT: 220 LBS

## 2024-08-23 DIAGNOSIS — L02.91 ABSCESS: Primary | ICD-10-CM

## 2024-08-23 RX ORDER — SULFAMETHOXAZOLE AND TRIMETHOPRIM 800; 160 MG/1; MG/1
1 TABLET ORAL 2 TIMES DAILY
Qty: 14 TABLET | Refills: 0 | Status: SHIPPED | OUTPATIENT
Start: 2024-08-23 | End: 2024-08-30

## 2024-08-23 NOTE — PROGRESS NOTES
"Subjective:      Patient ID: Arlin Daniels is a 24 y.o. female.    Chief Complaint: Recurrent Skin Infections (Boil on abdomen)      Bottle        Review of Systems   Constitutional:  Negative for activity change and unexpected weight change.   HENT:  Negative for hearing loss, rhinorrhea and trouble swallowing.    Eyes:  Negative for discharge and visual disturbance.   Respiratory:  Negative for chest tightness and wheezing.    Cardiovascular:  Negative for chest pain and palpitations.   Gastrointestinal:  Negative for blood in stool, constipation, diarrhea and vomiting.   Endocrine: Negative for polydipsia and polyuria.   Genitourinary:  Negative for difficulty urinating, dysuria, hematuria and menstrual problem.   Musculoskeletal:  Negative for arthralgias, joint swelling and neck pain.   Integumentary:         Boil: located on abdomen, present x 2days, draining   Neurological:  Negative for weakness and headaches.   Psychiatric/Behavioral:  Negative for confusion and dysphoric mood.          Objective:     Ht 4' 9.87" (1.47 m)   Wt 99.8 kg (220 lb)   BMI 46.18 kg/m²    Physical Exam  General:  Alert oriented, no acute distress  Neurologic:  Alert, oriented  Psychiatric:  Cooperative, appropriate mood and affect, normal judgment  Skin: abscess on abdomen    The patient location is: [place]  The chief complaint leading to consultation is: abscess    Visit type: audiovisual    Face to Face time with patient: 15 minutes of total time spent on the encounter, which includes face to face time and non-face to face time preparing to see the patient (eg, review of tests), Obtaining and/or reviewing separately obtained history, Documenting clinical information in the electronic or other health record, Independently interpreting results (not separately reported) and communicating results to the patient/family/caregiver, or Care coordination (not separately reported).         Each patient to whom he or she provides " medical services by telemedicine is:  (1) informed of the relationship between the physician and patient and the respective role of any other health care provider with respect to management of the patient; and (2) notified that he or she may decline to receive medical services by telemedicine and may withdraw from such care at any time.        Assessment:     Problem List Items Addressed This Visit    None  Visit Diagnoses       Abscess    -  Primary    Relevant Medications    sulfamethoxazole-trimethoprim 800-160mg (BACTRIM DS) 800-160 mg Tab             Plan:   1. Abscess  -     sulfamethoxazole-trimethoprim 800-160mg (BACTRIM DS) 800-160 mg Tab; Take 1 tablet by mouth 2 (two) times daily. for 7 days  Dispense: 14 tablet; Refill: 0  Rx for Bactrim DS b.i.d. x1 week   Warm compress t.i.d.   Keep area clean/dry; antibacterial soap   Mupirocin cream p.r.n.  Monitor   Return to clinic with any concerns

## 2024-09-30 ENCOUNTER — HOSPITAL ENCOUNTER (EMERGENCY)
Facility: HOSPITAL | Age: 24
Discharge: HOME | End: 2024-09-30
Attending: FAMILY MEDICINE
Payer: MEDICAID

## 2024-09-30 VITALS
HEART RATE: 86 BPM | BODY MASS INDEX: 42.33 KG/M2 | RESPIRATION RATE: 19 BRPM | TEMPERATURE: 98 F | OXYGEN SATURATION: 98 % | SYSTOLIC BLOOD PRESSURE: 129 MMHG | WEIGHT: 210 LBS | DIASTOLIC BLOOD PRESSURE: 82 MMHG | HEIGHT: 59 IN

## 2024-09-30 DIAGNOSIS — J06.9 VIRAL URI: Primary | ICD-10-CM

## 2024-09-30 DIAGNOSIS — R05.9 COUGH: ICD-10-CM

## 2024-09-30 LAB
FLUAV AG UPPER RESP QL IA.RAPID: NOT DETECTED
FLUBV AG UPPER RESP QL IA.RAPID: NOT DETECTED
SARS-COV-2 RNA RESP QL NAA+PROBE: NOT DETECTED
STREP A PCR (OHS): NOT DETECTED

## 2024-09-30 PROCEDURE — 99283 EMERGENCY DEPT VISIT LOW MDM: CPT | Mod: 25

## 2024-09-30 PROCEDURE — 0240U COVID/FLU A&B PCR: CPT | Performed by: FAMILY MEDICINE

## 2024-09-30 PROCEDURE — 87651 STREP A DNA AMP PROBE: CPT | Performed by: FAMILY MEDICINE

## 2024-09-30 RX ORDER — METHYLPREDNISOLONE 4 MG/1
TABLET ORAL
Qty: 1 EACH | Refills: 0 | Status: ON HOLD | OUTPATIENT
Start: 2024-09-30 | End: 2024-10-03

## 2024-09-30 NOTE — ED PROVIDER NOTES
Encounter Date: 9/30/2024       History     Chief Complaint   Patient presents with    Cough    Nasal Congestion     Cough and nasal congestion since last night.  Also c/o sore throat.       Patient is a 25-year-old female with a history of down syndrome who comes in with cough nasal congestion since last night.  She also complains of a sore throat.  Mom concerned because patient gets pneumonia easily      The history is provided by the patient.     Review of patient's allergies indicates:  No Known Allergies  Past Medical History:   Diagnosis Date    Diabetes mellitus     Down syndrome     Heart valve insufficiency     Hypothyroidism     Psoriasis      Past Surgical History:   Procedure Laterality Date    TUMOR REMOVAL       No family history on file.  Social History     Tobacco Use    Smoking status: Never    Smokeless tobacco: Never   Substance Use Topics    Alcohol use: Not Currently    Drug use: Not Currently     Review of Systems   Constitutional: Negative.    HENT:  Positive for congestion.    Respiratory:  Positive for cough.    Cardiovascular: Negative.    Endocrine: Negative.    Neurological: Negative.    Psychiatric/Behavioral: Negative.     All other systems reviewed and are negative.      Physical Exam     Initial Vitals [09/30/24 1302]   BP Pulse Resp Temp SpO2   (!) 133/90 84 (!) 22 98.1 °F (36.7 °C) 97 %      MAP       --         Physical Exam    Vitals reviewed.  Constitutional: She appears well-developed.   HENT:   Head: Normocephalic.   Nose: Mucosal edema present. Right sinus exhibits maxillary sinus tenderness. Left sinus exhibits maxillary sinus tenderness.   Eyes: Pupils are equal, round, and reactive to light.   Neck:   Normal range of motion.  Cardiovascular:  Normal rate, regular rhythm and normal heart sounds.           Pulmonary/Chest: Breath sounds normal.   Abdominal: Abdomen is soft.   Musculoskeletal:         General: Normal range of motion.      Cervical back: Normal range of motion.      Neurological: She is alert and oriented to person, place, and time. GCS score is 15. GCS eye subscore is 4. GCS verbal subscore is 5. GCS motor subscore is 6.   Skin: Skin is warm and dry.   Psychiatric: She has a normal mood and affect.         ED Course   Procedures  Labs Reviewed   COVID/FLU A&B PCR - Normal       Result Value    Influenza A PCR Not Detected      Influenza B PCR Not Detected      SARS-CoV-2 PCR Not Detected      Narrative:     The Xpert Xpress SARS-CoV-2/FLU/RSV plus is a rapid, multiplexed real-time PCR test intended for the simultaneous qualitative detection and differentiation of SARS-CoV-2, Influenza A, Influenza B, and respiratory syncytial virus (RSV) viral RNA in either nasopharyngeal swab or nasal swab specimens.         STREP GROUP A BY PCR - Normal    STREP A PCR ([facility]) Not Detected      Narrative:     The Xpert Xpress Strep A test is a rapid, qualitative in vitro diagnostic test for the detection of Streptococcus pyogenes (Group A ß-hemolytic Streptococcus, Strep A) in throat swab specimens from patients with signs and symptoms of pharyngitis.            Imaging Results              X-Ray Chest AP Portable (Final result)  Result time 09/30/24 14:07:43      Final result by Ady Rowe MD (09/30/24 14:07:43)                   Impression:      Mild central vascular congestion, possibly accentuated due to low lung volumes.      Electronically signed by: Ady Rowe MD  Date:    09/30/2024  Time:    14:07               Narrative:    EXAMINATION:  XR CHEST AP PORTABLE    CLINICAL HISTORY:  Cough, unspecified    COMPARISON:  12/30/2023    FINDINGS:  Single view of the chest shows low lung volumes with mild central vascular congestion.  No lobar type consolidation, pneumothorax or large effusion.  Heart size is normal.                                       Medications - No data to display  Medical Decision Making  This patient is a 24-year-old female who comes in with nasal  congestion, cough, headache  Differential diagnosis:  Viral URI, COVID, flu, sinusitis, strep throat    Amount and/or Complexity of Data Reviewed  Radiology: ordered.     Details: Patient's strep, flu, and COVID are negative    Risk  Prescription drug management.                                      Clinical Impression:  Final diagnoses:  [R05.9] Cough  [J06.9] Viral URI (Primary)          ED Disposition Condition    Discharge Stable          ED Prescriptions       Medication Sig Dispense Start Date End Date Auth. Provider    methylPREDNISolone (MEDROL DOSEPACK) 4 mg tablet Take as directed 1 each 9/30/2024 10/21/2024 Nguyễn Dixon MD          Follow-up Information       Follow up With Specialties Details Why Contact Info    Ferny Whiting MD Family Medicine Schedule an appointment as soon as possible for a visit in 2 days  [address] [place] [address]  [phone]               Nguyễn Dixon MD  09/30/24 8413

## 2024-10-03 ENCOUNTER — HOSPITAL ENCOUNTER (OUTPATIENT)
Facility: HOSPITAL | Age: 24
Discharge: HOME | End: 2024-10-04
Attending: GENERAL PRACTICE | Admitting: FAMILY MEDICINE
Payer: MEDICAID

## 2024-10-03 DIAGNOSIS — E10.65 HYPERGLYCEMIA DUE TO TYPE 1 DIABETES MELLITUS: Primary | ICD-10-CM

## 2024-10-03 LAB
ALBUMIN SERPL-MCNC: 3.8 G/DL (ref 3.5–5)
ALBUMIN/GLOB SERPL: 1.1 RATIO (ref 1.1–2)
ALLENS TEST: ABNORMAL
ALP SERPL-CCNC: 108 UNIT/L (ref 40–150)
ALT SERPL-CCNC: 15 UNIT/L (ref 0–55)
ANION GAP SERPL CALC-SCNC: 13 MEQ/L
AST SERPL-CCNC: 13 UNIT/L (ref 5–34)
BASOPHILS # BLD AUTO: 0.06 X10(3)/MCL
BASOPHILS NFR BLD AUTO: 0.9 %
BILIRUB SERPL-MCNC: 0.7 MG/DL
BILIRUB UR QL STRIP.AUTO: NEGATIVE
BUN SERPL-MCNC: 21 MG/DL (ref 7–18.7)
CALCIUM SERPL-MCNC: 9.7 MG/DL (ref 8.4–10.2)
CHLORIDE SERPL-SCNC: 94 MMOL/L (ref 98–107)
CLARITY UR: CLEAR
CO2 SERPL-SCNC: 23 MMOL/L (ref 22–29)
COLOR UR AUTO: YELLOW
CREAT SERPL-MCNC: 1.38 MG/DL (ref 0.55–1.02)
CREAT/UREA NIT SERPL: 15
DELSYS: ABNORMAL
EOSINOPHIL # BLD AUTO: 0.02 X10(3)/MCL (ref 0–0.9)
EOSINOPHIL NFR BLD AUTO: 0.3 %
ERYTHROCYTE [DISTWIDTH] IN BLOOD BY AUTOMATED COUNT: 13.7 % (ref 11.5–17)
EST. AVERAGE GLUCOSE BLD GHB EST-MCNC: 159.9 MG/DL
GFR SERPLBLD CREATININE-BSD FMLA CKD-EPI: 56 ML/MIN/1.73/M2
GLOBULIN SER-MCNC: 3.6 GM/DL (ref 2.4–3.5)
GLUCOSE SERPL-MCNC: 688 MG/DL (ref 74–100)
GLUCOSE UR QL STRIP: ABNORMAL
HBA1C MFR BLD: 7.2 %
HCO3 UR-SCNC: 23.5 MMOL/L (ref 24–28)
HCT VFR BLD AUTO: 43 % (ref 37–47)
HGB BLD-MCNC: 14.3 G/DL (ref 12–16)
HGB UR QL STRIP: NEGATIVE
IMM GRANULOCYTES # BLD AUTO: 0.01 X10(3)/MCL (ref 0–0.04)
IMM GRANULOCYTES NFR BLD AUTO: 0.1 %
KETONES UR QL STRIP: ABNORMAL
LEUKOCYTE ESTERASE UR QL STRIP: NEGATIVE
LYMPHOCYTES # BLD AUTO: 1.55 X10(3)/MCL (ref 0.6–4.6)
LYMPHOCYTES NFR BLD AUTO: 22.6 %
MCH RBC QN AUTO: 31.5 PG (ref 27–31)
MCHC RBC AUTO-ENTMCNC: 33.3 G/DL (ref 33–36)
MCV RBC AUTO: 94.7 FL (ref 80–94)
MONOCYTES # BLD AUTO: 0.5 X10(3)/MCL (ref 0.1–1.3)
MONOCYTES NFR BLD AUTO: 7.3 %
NEUTROPHILS # BLD AUTO: 4.71 X10(3)/MCL (ref 2.1–9.2)
NEUTROPHILS NFR BLD AUTO: 68.8 %
NITRITE UR QL STRIP: NEGATIVE
NRBC BLD AUTO-RTO: 0 %
PCO2 BLDA: 42.3 MMHG (ref 35–45)
PH SMN: 7.35 [PH] (ref 7.35–7.45)
PH UR STRIP: 6 [PH]
PLATELET # BLD AUTO: 215 X10(3)/MCL (ref 130–400)
PMV BLD AUTO: 10.7 FL (ref 7.4–10.4)
PO2 BLDA: 87 MMHG (ref 80–100)
POC BE: -2 MMOL/L
POC SATURATED O2: 96 % (ref 95–100)
POC TCO2: 25 MMOL/L (ref 23–27)
POCT GLUCOSE: 312 MG/DL (ref 70–110)
POCT GLUCOSE: 370 MG/DL (ref 70–110)
POCT GLUCOSE: 401 MG/DL (ref 70–110)
POTASSIUM SERPL-SCNC: 4.4 MMOL/L (ref 3.5–5.1)
PROT SERPL-MCNC: 7.4 GM/DL (ref 6.4–8.3)
PROT UR QL STRIP: NEGATIVE
RBC # BLD AUTO: 4.54 X10(6)/MCL (ref 4.2–5.4)
SAMPLE: ABNORMAL
SITE: ABNORMAL
SODIUM SERPL-SCNC: 130 MMOL/L (ref 136–145)
SP GR UR STRIP.AUTO: 1.01 (ref 1–1.03)
UROBILINOGEN UR STRIP-ACNC: 0.2
WBC # BLD AUTO: 6.85 X10(3)/MCL (ref 4.5–11.5)

## 2024-10-03 PROCEDURE — 99222 1ST HOSP IP/OBS MODERATE 55: CPT | Mod: ,,, | Performed by: FAMILY MEDICINE

## 2024-10-03 PROCEDURE — 82803 BLOOD GASES ANY COMBINATION: CPT

## 2024-10-03 PROCEDURE — 85025 COMPLETE CBC W/AUTO DIFF WBC: CPT | Performed by: GENERAL PRACTICE

## 2024-10-03 PROCEDURE — 25000003 PHARM REV CODE 250: Performed by: GENERAL PRACTICE

## 2024-10-03 PROCEDURE — 36600 WITHDRAWAL OF ARTERIAL BLOOD: CPT

## 2024-10-03 PROCEDURE — 96372 THER/PROPH/DIAG INJ SC/IM: CPT | Performed by: FAMILY MEDICINE

## 2024-10-03 PROCEDURE — 63600175 PHARM REV CODE 636 W HCPCS: Performed by: FAMILY MEDICINE

## 2024-10-03 PROCEDURE — 99900035 HC TECH TIME PER 15 MIN (STAT)

## 2024-10-03 PROCEDURE — 63600175 PHARM REV CODE 636 W HCPCS: Performed by: GENERAL PRACTICE

## 2024-10-03 PROCEDURE — G0378 HOSPITAL OBSERVATION PER HR: HCPCS

## 2024-10-03 PROCEDURE — 83036 HEMOGLOBIN GLYCOSYLATED A1C: CPT | Performed by: FAMILY MEDICINE

## 2024-10-03 PROCEDURE — 80053 COMPREHEN METABOLIC PANEL: CPT | Performed by: GENERAL PRACTICE

## 2024-10-03 PROCEDURE — 81003 URINALYSIS AUTO W/O SCOPE: CPT | Performed by: GENERAL PRACTICE

## 2024-10-03 PROCEDURE — 36415 COLL VENOUS BLD VENIPUNCTURE: CPT | Performed by: FAMILY MEDICINE

## 2024-10-03 PROCEDURE — 94761 N-INVAS EAR/PLS OXIMETRY MLT: CPT | Mod: XB

## 2024-10-03 RX ORDER — ARIPIPRAZOLE 2 MG/1
2 TABLET ORAL DAILY
Status: DISCONTINUED | OUTPATIENT
Start: 2024-10-04 | End: 2024-10-03

## 2024-10-03 RX ORDER — LEVOTHYROXINE SODIUM 100 UG/1
100 TABLET ORAL DAILY
Status: DISCONTINUED | OUTPATIENT
Start: 2024-10-04 | End: 2024-10-04 | Stop reason: HOSPADM

## 2024-10-03 RX ORDER — ONDANSETRON HYDROCHLORIDE 4 MG/5ML
4 SOLUTION ORAL EVERY 6 HOURS PRN
Status: DISCONTINUED | OUTPATIENT
Start: 2024-10-03 | End: 2024-10-03

## 2024-10-03 RX ORDER — DULOXETIN HYDROCHLORIDE 30 MG/1
60 CAPSULE, DELAYED RELEASE ORAL NIGHTLY
Status: DISCONTINUED | OUTPATIENT
Start: 2024-10-03 | End: 2024-10-04 | Stop reason: HOSPADM

## 2024-10-03 RX ORDER — INSULIN ASPART 100 [IU]/ML
0-10 INJECTION, SOLUTION INTRAVENOUS; SUBCUTANEOUS
Status: DISCONTINUED | OUTPATIENT
Start: 2024-10-03 | End: 2024-10-03

## 2024-10-03 RX ORDER — SODIUM CHLORIDE 9 MG/ML
INJECTION, SOLUTION INTRAVENOUS CONTINUOUS
Status: DISCONTINUED | OUTPATIENT
Start: 2024-10-03 | End: 2024-10-04 | Stop reason: HOSPADM

## 2024-10-03 RX ORDER — IBUPROFEN 200 MG
24 TABLET ORAL
Status: DISCONTINUED | OUTPATIENT
Start: 2024-10-03 | End: 2024-10-03

## 2024-10-03 RX ORDER — IBUPROFEN 200 MG
16 TABLET ORAL
Status: DISCONTINUED | OUTPATIENT
Start: 2024-10-03 | End: 2024-10-04 | Stop reason: HOSPADM

## 2024-10-03 RX ORDER — INSULIN ASPART 100 [IU]/ML
0-5 INJECTION, SOLUTION INTRAVENOUS; SUBCUTANEOUS
Status: DISCONTINUED | OUTPATIENT
Start: 2024-10-03 | End: 2024-10-03

## 2024-10-03 RX ORDER — SODIUM CHLORIDE 450 MG/100ML
1000 INJECTION, SOLUTION INTRAVENOUS
Status: COMPLETED | OUTPATIENT
Start: 2024-10-03 | End: 2024-10-03

## 2024-10-03 RX ORDER — SULFAMETHOXAZOLE AND TRIMETHOPRIM 800; 160 MG/1; MG/1
1 TABLET ORAL 2 TIMES DAILY
Status: DISCONTINUED | OUTPATIENT
Start: 2024-10-04 | End: 2024-10-04 | Stop reason: HOSPADM

## 2024-10-03 RX ORDER — INSULIN ASPART 100 [IU]/ML
0-5 INJECTION, SOLUTION INTRAVENOUS; SUBCUTANEOUS
Status: DISCONTINUED | OUTPATIENT
Start: 2024-10-03 | End: 2024-10-04

## 2024-10-03 RX ORDER — GLUCAGON 1 MG
1 KIT INJECTION
Status: DISCONTINUED | OUTPATIENT
Start: 2024-10-03 | End: 2024-10-04 | Stop reason: HOSPADM

## 2024-10-03 RX ORDER — IBUPROFEN 200 MG
16 TABLET ORAL
Status: DISCONTINUED | OUTPATIENT
Start: 2024-10-03 | End: 2024-10-03

## 2024-10-03 RX ORDER — GLUCAGON 1 MG
1 KIT INJECTION
Status: DISCONTINUED | OUTPATIENT
Start: 2024-10-03 | End: 2024-10-03

## 2024-10-03 RX ORDER — ONDANSETRON HYDROCHLORIDE 2 MG/ML
4 INJECTION, SOLUTION INTRAVENOUS EVERY 6 HOURS PRN
Status: DISCONTINUED | OUTPATIENT
Start: 2024-10-03 | End: 2024-10-04 | Stop reason: HOSPADM

## 2024-10-03 RX ORDER — DULOXETIN HYDROCHLORIDE 30 MG/1
60 CAPSULE, DELAYED RELEASE ORAL DAILY
Status: DISCONTINUED | OUTPATIENT
Start: 2024-10-04 | End: 2024-10-03

## 2024-10-03 RX ORDER — TALC
6 POWDER (GRAM) TOPICAL NIGHTLY PRN
Status: DISCONTINUED | OUTPATIENT
Start: 2024-10-03 | End: 2024-10-04 | Stop reason: HOSPADM

## 2024-10-03 RX ORDER — IBUPROFEN 200 MG
24 TABLET ORAL
Status: DISCONTINUED | OUTPATIENT
Start: 2024-10-03 | End: 2024-10-04 | Stop reason: HOSPADM

## 2024-10-03 RX ORDER — ARIPIPRAZOLE 2 MG/1
2 TABLET ORAL NIGHTLY
Status: DISCONTINUED | OUTPATIENT
Start: 2024-10-03 | End: 2024-10-04 | Stop reason: HOSPADM

## 2024-10-03 RX ORDER — SODIUM CHLORIDE 0.9 % (FLUSH) 0.9 %
10 SYRINGE (ML) INJECTION
Status: DISCONTINUED | OUTPATIENT
Start: 2024-10-03 | End: 2024-10-04 | Stop reason: HOSPADM

## 2024-10-03 RX ADMIN — INSULIN HUMAN 10 UNITS: 100 INJECTION, SOLUTION PARENTERAL at 01:10

## 2024-10-03 RX ADMIN — SODIUM CHLORIDE, POTASSIUM CHLORIDE, SODIUM LACTATE AND CALCIUM CHLORIDE 1000 ML: 600; 310; 30; 20 INJECTION, SOLUTION INTRAVENOUS at 02:10

## 2024-10-03 RX ADMIN — SODIUM CHLORIDE: 9 INJECTION, SOLUTION INTRAVENOUS at 05:10

## 2024-10-03 RX ADMIN — ONDANSETRON 4 MG: 2 INJECTION INTRAMUSCULAR; INTRAVENOUS at 07:10

## 2024-10-03 RX ADMIN — SODIUM CHLORIDE 1000 ML: 4.5 INJECTION, SOLUTION INTRAVENOUS at 04:10

## 2024-10-03 RX ADMIN — INSULIN HUMAN 12 UNITS: 100 INJECTION, SOLUTION PARENTERAL at 03:10

## 2024-10-03 RX ADMIN — INSULIN ASPART 8 UNITS: 100 INJECTION, SOLUTION INTRAVENOUS; SUBCUTANEOUS at 06:10

## 2024-10-03 RX ADMIN — SODIUM CHLORIDE 1000 ML: 9 INJECTION, SOLUTION INTRAVENOUS at 01:10

## 2024-10-03 RX ADMIN — INSULIN ASPART 6 UNITS: 100 INJECTION, SOLUTION INTRAVENOUS; SUBCUTANEOUS at 10:10

## 2024-10-03 NOTE — ASSESSMENT & PLAN NOTE
Admit patient   IV fluids   Monitor CBGS q.2 hours   Insulin sliding scale as needed  Trend lab work

## 2024-10-03 NOTE — H&P
[facility] Medicine  History & Physical    Patient Name: Arlin Daniels  MRN: [id number]  Patient Class: OP- Observation  Admission Date: 10/3/2024  Attending Physician: Ferny Whiting MD   Primary Care Provider: Ferny Whiting MD         Patient information was obtained from parent and ER records.     Subjective:     Principal Problem:<principal problem not specified>    Chief Complaint:   Chief Complaint   Patient presents with    Hyperglycemia     Patient having dizziness and high blood sugar after taken steroids for upper resp infection.        HPI:   Chief Complaint   Patient presents with    Hyperglycemia       Patient having dizziness and high blood sugar after taken steroids for upper resp infection.      Patient having dizziness and high blood sugar after taken steroids for upper resp infection. Type 1 Diabetic with an insulin pump. Blood sugar reading high and the insulin pump did not help it come down.  Patient with recent upper respiratory infection and started on Medrol Dosepak.  Patient unable to keep sugars controlled past that point.  Patient presented to ER for further evaluation.  Workup revealed patient to not be in DKA but ER physician was unable to get glucose to trend down on a timely manner.  Patient will be admitted for IV fluids and insulin sliding scale in order to return glucose back to normal.    Past Medical History:   Diagnosis Date    Diabetes mellitus     Down syndrome     Heart valve insufficiency     Hypothyroidism     Psoriasis        Past Surgical History:   Procedure Laterality Date    TUMOR REMOVAL         Review of patient's allergies indicates:  No Known Allergies    No current facility-administered medications on file prior to encounter.     Current Outpatient Medications on File Prior to Encounter   Medication Sig    ARIPiprazole (ABILIFY) 2 MG Tab Take 1 tablet (2 mg total) by mouth once daily.    BAQSIMI 3 mg/actuation  "[place] by Each Nostril route.    DULoxetine (CYMBALTA) 60 MG capsule TAKE ONE CAPSULE BY MOUTH ONCE DAILY    levothyroxine (SYNTHROID) 100 MCG tablet Take 100 mcg by mouth once daily.    acetone, urine, test (KETONE URINE TEST) Strp Check when blood sugars are above 240 mg/dl    albuterol (ACCUNEB) 1.25 mg/3 mL Nebu Take 3 mLs (1.25 mg total) by nebulization every 6 (six) hours as needed (wheezing).    blood sugar diagnostic Strp Check blood sugar 6 - 8  times daily    blood-glucose meter,continuous (DEXCOM G6 ) Misc Use as directed.    blood-glucose sensor (DEXCOM G6 SENSOR) Callie Use as directed.  3 sensors per month.    blood-glucose transmitter (DEXCOM G6 TRANSMITTER) Callie Use as directed.  4 per year.    fluticasone propionate (FLONASE) 50 mcg/actuation nasal spray USE 2 SPRAYS IN EACH NOSTRIL DAILY    HUMALOG KWIKPEN INSULIN 100 unit/mL pen Inject into the skin.    insulin glargine (LANTUS) 100 unit/mL injection Inject into the skin.    KETOSTIX strip     lancets 33 gauge Misc Check blood sugar 6 - 8  times daily    loratadine (CLARITIN) 10 mg tablet TAKE ONE TABLET BY MOUTH DAILY FOR SINUS    mupirocin (BACTROBAN) 2 % ointment APPLY topically THREE TIMES DAILY    OMNIPOD 5 G6 PODS, GEN 5, Crtg SMARTSIG:SUB-Q Every Other Day    ONETOUCH VERIO TEST STRIPS Strp     pen needle, diabetic 32 gauge x 5/32" Ndle USE AS DIRECTED FOR 6 INJECTIONS DAILY    [DISCONTINUED] dicyclomine (BENTYL) 10 MG capsule Take 1 capsule (10 mg total) by mouth every 6 (six) hours as needed (diarrhea).    [DISCONTINUED] methylPREDNISolone (MEDROL DOSEPACK) 4 mg tablet Take as directed    [DISCONTINUED] ondansetron (ZOFRAN) 4 MG tablet Take 1 tablet (4 mg total) by mouth 2 (two) times daily. (Patient not taking: Reported on 8/23/2024)     Family History    None       Tobacco Use    Smoking status: Never    Smokeless tobacco: Never   Substance and Sexual Activity    Alcohol use: Not Currently    Drug use: Not Currently    Sexual " activity: Not Currently     Review of Systems   Constitutional: Negative.    Respiratory: Negative.     Cardiovascular: Negative.    Endocrine:        Hyperglycemia     Objective:     Vital Signs (Most Recent):  Temp: 98.6 °F (37 °C) (10/03/24 1715)  Pulse: 85 (10/03/24 1715)  Resp: 18 (10/03/24 1715)  BP: 102/70 (10/03/24 1715)  SpO2: 95 % (10/03/24 1800) Vital Signs (24h Range):  Temp:  [96.8 °F (36 °C)-98.6 °F (37 °C)] 98.6 °F (37 °C)  Pulse:  [85-99] 85  Resp:  [18-20] 18  SpO2:  [95 %-97 %] 95 %  BP: (102-124)/(70-77) 102/70     Weight: 99.1 kg (218 lb 7.6 oz)  Body mass index is 45.66 kg/m².     Physical Exam  Constitutional:       Appearance: Normal appearance.   Cardiovascular:      Rate and Rhythm: Normal rate and regular rhythm.      Heart sounds: Normal heart sounds.   Pulmonary:      Effort: Pulmonary effort is normal.      Breath sounds: Normal breath sounds.   Abdominal:      General: Bowel sounds are normal.      Palpations: Abdomen is soft.   Skin:     General: Skin is warm.   Neurological:      Mental Status: She is alert.   Psychiatric:         Mood and Affect: Mood normal.         Behavior: Behavior normal.         Thought Content: Thought content normal.         Judgment: Judgment normal.                Significant Labs: All pertinent labs within the past 24 hours have been reviewed.  CBC:   Recent Labs   Lab 10/03/24  1330   WBC 6.85   HGB 14.3   HCT 43.0        CMP:   Recent Labs   Lab 10/03/24  1330   *   K 4.4   CL 94*   CO2 23   BUN 21.0*   CREATININE 1.38*   CALCIUM 9.7   ALBUMIN 3.8   BILITOT 0.7   ALKPHOS 108   AST 13   ALT 15       Significant Imaging: I have reviewed all pertinent imaging results/findings within the past 24 hours.  Assessment/Plan:     Hyperglycemia due to type 1 diabetes mellitus  Admit patient   IV fluids   Monitor CBGS q.2 hours   Insulin sliding scale as needed  Trend lab work      Hypothyroidism  Continue home medication        VTE Risk Mitigation  (From admission, onward)           Ordered     IP VTE LOW RISK PATIENT  Once         10/03/24 1728                       On 10/03/2024, patient should be placed in hospital observation services under my care.             Ferny Whiting MD  Department of Hospital Medicine  [facility]

## 2024-10-03 NOTE — SUBJECTIVE & OBJECTIVE
"Past Medical History:   Diagnosis Date    Diabetes mellitus     Down syndrome     Heart valve insufficiency     Hypothyroidism     Psoriasis        Past Surgical History:   Procedure Laterality Date    TUMOR REMOVAL         Review of patient's allergies indicates:  No Known Allergies    No current facility-administered medications on file prior to encounter.     Current Outpatient Medications on File Prior to Encounter   Medication Sig    ARIPiprazole (ABILIFY) 2 MG Tab Take 1 tablet (2 mg total) by mouth once daily.    BAQSIMI 3 mg/actuation [place] by Each Nostril route.    DULoxetine (CYMBALTA) 60 MG capsule TAKE ONE CAPSULE BY MOUTH ONCE DAILY    levothyroxine (SYNTHROID) 100 MCG tablet Take 100 mcg by mouth once daily.    acetone, urine, test (KETONE URINE TEST) Strp Check when blood sugars are above 240 mg/dl    albuterol (ACCUNEB) 1.25 mg/3 mL Nebu Take 3 mLs (1.25 mg total) by nebulization every 6 (six) hours as needed (wheezing).    blood sugar diagnostic Strp Check blood sugar 6 - 8  times daily    blood-glucose meter,continuous (DEXCOM G6 ) Misc Use as directed.    blood-glucose sensor (DEXCOM G6 SENSOR) Callie Use as directed.  3 sensors per month.    blood-glucose transmitter (DEXCOM G6 TRANSMITTER) Callie Use as directed.  4 per year.    fluticasone propionate (FLONASE) 50 mcg/actuation nasal spray USE 2 SPRAYS IN EACH NOSTRIL DAILY    HUMALOG KWIKPEN INSULIN 100 unit/mL pen Inject into the skin.    insulin glargine (LANTUS) 100 unit/mL injection Inject into the skin.    KETOSTIX strip     lancets 33 gauge Misc Check blood sugar 6 - 8  times daily    loratadine (CLARITIN) 10 mg tablet TAKE ONE TABLET BY MOUTH DAILY FOR SINUS    mupirocin (BACTROBAN) 2 % ointment APPLY topically THREE TIMES DAILY    OMNIPOD 5 G6 PODS, GEN 5, Crtg SMARTSIG:SUB-Q Every Other Day    ONETOUCH VERIO TEST STRIPS Strp     pen needle, diabetic 32 gauge x 5/32" Ndle USE AS DIRECTED FOR 6 INJECTIONS DAILY    [DISCONTINUED] " dicyclomine (BENTYL) 10 MG capsule Take 1 capsule (10 mg total) by mouth every 6 (six) hours as needed (diarrhea).    [DISCONTINUED] methylPREDNISolone (MEDROL DOSEPACK) 4 mg tablet Take as directed    [DISCONTINUED] ondansetron (ZOFRAN) 4 MG tablet Take 1 tablet (4 mg total) by mouth 2 (two) times daily. (Patient not taking: Reported on 8/23/2024)     Family History    None       Tobacco Use    Smoking status: Never    Smokeless tobacco: Never   Substance and Sexual Activity    Alcohol use: Not Currently    Drug use: Not Currently    Sexual activity: Not Currently     Review of Systems   Constitutional: Negative.    Respiratory: Negative.     Cardiovascular: Negative.    Endocrine:        Hyperglycemia     Objective:     Vital Signs (Most Recent):  Temp: 98.6 °F (37 °C) (10/03/24 1715)  Pulse: 85 (10/03/24 1715)  Resp: 18 (10/03/24 1715)  BP: 102/70 (10/03/24 1715)  SpO2: 95 % (10/03/24 1800) Vital Signs (24h Range):  Temp:  [96.8 °F (36 °C)-98.6 °F (37 °C)] 98.6 °F (37 °C)  Pulse:  [85-99] 85  Resp:  [18-20] 18  SpO2:  [95 %-97 %] 95 %  BP: (102-124)/(70-77) 102/70     Weight: 99.1 kg (218 lb 7.6 oz)  Body mass index is 45.66 kg/m².     Physical Exam  Constitutional:       Appearance: Normal appearance.   Cardiovascular:      Rate and Rhythm: Normal rate and regular rhythm.      Heart sounds: Normal heart sounds.   Pulmonary:      Effort: Pulmonary effort is normal.      Breath sounds: Normal breath sounds.   Abdominal:      General: Bowel sounds are normal.      Palpations: Abdomen is soft.   Skin:     General: Skin is warm.   Neurological:      Mental Status: She is alert.   Psychiatric:         Mood and Affect: Mood normal.         Behavior: Behavior normal.         Thought Content: Thought content normal.         Judgment: Judgment normal.                Significant Labs: All pertinent labs within the past 24 hours have been reviewed.  CBC:   Recent Labs   Lab 10/03/24  1330   WBC 6.85   HGB 14.3   HCT 43.0         CMP:   Recent Labs   Lab 10/03/24  1330   *   K 4.4   CL 94*   CO2 23   BUN 21.0*   CREATININE 1.38*   CALCIUM 9.7   ALBUMIN 3.8   BILITOT 0.7   ALKPHOS 108   AST 13   ALT 15       Significant Imaging: I have reviewed all pertinent imaging results/findings within the past 24 hours.

## 2024-10-03 NOTE — HPI
Chief Complaint   Patient presents with    Hyperglycemia       Patient having dizziness and high blood sugar after taken steroids for upper resp infection.      Patient having dizziness and high blood sugar after taken steroids for upper resp infection. Type 1 Diabetic with an insulin pump. Blood sugar reading high and the insulin pump did not help it come down.  Patient with recent upper respiratory infection and started on Medrol Dosepak.  Patient unable to keep sugars controlled past that point.  Patient presented to ER for further evaluation.  Workup revealed patient to not be in DKA but ER physician was unable to get glucose to trend down on a timely manner.  Patient will be admitted for IV fluids and insulin sliding scale in order to return glucose back to normal.

## 2024-10-03 NOTE — ED PROVIDER NOTES
Encounter Date: 10/3/2024       History     Chief Complaint   Patient presents with    Hyperglycemia     Patient having dizziness and high blood sugar after taken steroids for upper resp infection.     Patient having dizziness and high blood sugar after taken steroids for upper resp infection. Type 1 Diabetic with an insulin pump. Blood sugar reading high and the insulin pump did not help it come down.    The history is provided by a caregiver.     Review of patient's allergies indicates:  No Known Allergies  Past Medical History:   Diagnosis Date    Diabetes mellitus     Down syndrome     Heart valve insufficiency     Hypothyroidism     Psoriasis      Past Surgical History:   Procedure Laterality Date    TUMOR REMOVAL       No family history on file.  Social History     Tobacco Use    Smoking status: Never    Smokeless tobacco: Never   Substance Use Topics    Alcohol use: Not Currently    Drug use: Not Currently     Review of Systems   Constitutional: Negative.    HENT: Negative.     Eyes: Negative.    Respiratory: Negative.     Cardiovascular: Negative.    Gastrointestinal: Negative.    Endocrine: Positive for polydipsia and polyuria.   Genitourinary: Negative.    Musculoskeletal: Negative.    Skin: Negative.    Allergic/Immunologic: Negative.    Neurological:  Positive for dizziness.   Hematological: Negative.    Psychiatric/Behavioral: Negative.     All other systems reviewed and are negative.      Physical Exam     Initial Vitals   BP Pulse Resp Temp SpO2   10/03/24 1324 10/03/24 1324 10/03/24 1324 10/03/24 1324 10/03/24 1326   124/77 99 20 96.8 °F (36 °C) 97 %      MAP       --                Physical Exam    Nursing note and vitals reviewed.  Constitutional: She appears well-developed and well-nourished.   Obese, down's features   HENT:   Head: Normocephalic and atraumatic.   Eyes: EOM are normal. Pupils are equal, round, and reactive to light.   Neck: Neck supple.   Normal range of motion.  Cardiovascular:   Normal rate, regular rhythm, normal heart sounds and intact distal pulses.           Pulmonary/Chest: Breath sounds normal.   Abdominal: Abdomen is soft. Bowel sounds are normal.   Musculoskeletal:         General: Normal range of motion.      Cervical back: Normal range of motion and neck supple.     Neurological: She is alert and oriented to person, place, and time. She has normal strength. GCS score is 15. GCS eye subscore is 4. GCS verbal subscore is 5. GCS motor subscore is 6.   Skin: Skin is warm and dry.   Psychiatric: She has a normal mood and affect. Her behavior is normal. Judgment and thought content normal.         ED Course   Procedures  Labs Reviewed   COMPREHENSIVE METABOLIC PANEL - Abnormal       Result Value    Sodium 130 (*)     Potassium 4.4      Chloride 94 (*)     CO2 23      Glucose 688 (*)     Blood Urea Nitrogen 21.0 (*)     Creatinine 1.38 (*)     Calcium 9.7      Protein Total 7.4      Albumin 3.8      Globulin 3.6 (*)     Albumin/Globulin Ratio 1.1      Bilirubin Total 0.7            ALT 15      AST 13      eGFR 56      Anion Gap 13.0      BUN/Creatinine Ratio 15     CBC WITH DIFFERENTIAL - Abnormal    WBC 6.85      RBC 4.54      Hgb 14.3      Hct 43.0      MCV 94.7 (*)     MCH 31.5 (*)     MCHC 33.3      RDW 13.7      Platelet 215      MPV 10.7 (*)     Neut % 68.8      Lymph % 22.6      Mono % 7.3      Eos % 0.3      Basophil % 0.9      Lymph # 1.55      Neut # 4.71      Mono # 0.50      Eos # 0.02      Baso # 0.06      IG# 0.01      IG% 0.1      NRBC% 0.0     URINALYSIS, REFLEX TO URINE CULTURE - Abnormal    Color, UA Yellow      Appearance, UA Clear      Specific Gravity, UA 1.010      pH, UA 6.0      Protein, UA Negative      Glucose, UA 3+ (*)     Ketones, UA 1+ (*)     Blood, UA Negative      Bilirubin, UA Negative      Urobilinogen, UA 0.2      Nitrites, UA Negative      Leukocyte Esterase, UA Negative     ISTAT PROCEDURE - Abnormal    POC PH 7.352      POC PCO2 42.3      POC  PO2 87      POC HCO3 23.5 (*)     POC BE -2      POC SATURATED O2 96      POC TCO2 25      Sample ARTERIAL      Site LR      Allens Test Pass      DelSys Room Air     CBC W/ AUTO DIFFERENTIAL    Narrative:     The following orders were created for panel order CBC auto differential.  Procedure                               Abnormality         Status                     ---------                               -----------         ------                     CBC with Differential[[id number]]       Abnormal            Final result                 Please view results for these tests on the individual orders.   POCT GLUCOSE MONITORING CONTINUOUS          Imaging Results              X-Ray Chest PA And Lateral (Final result)  Result time 10/03/24 14:45:57      Final result by Romie Ochoa MD (10/03/24 14:45:57)                   Impression:      No acute cardiopulmonary process identified.      Electronically signed by: Romie Ochoa  Date:    10/03/2024  Time:    14:45               Narrative:    EXAMINATION:  XR CHEST PA AND LATERAL    CLINICAL HISTORY:  Type 1 diabetes mellitus with hyperglycemia    TECHNIQUE:  Two-view    COMPARISON:  March 30, 2024.    FINDINGS:  Cardiopericardial silhouette is within normal limits.  No acute dense focal or segmental consolidation, congestion, pleural effusion or pneumothorax.                                       Medications   sodium chloride 0.9% bolus 1,000 mL 1,000 mL (0 mLs Intravenous Stopped 10/3/24 1448)   insulin regular injection 10 Units 0.1 mL (10 Units Intravenous Given 10/3/24 1356)   lactated ringers bolus 1,000 mL (0 mLs Intravenous Stopped 10/3/24 1619)   insulin regular injection 12 Units 0.12 mL (12 Units Intravenous Given 10/3/24 1504)   0.45% NaCl infusion (1,000 mLs Intravenous New Bag 10/3/24 1630)     Medical Decision Making  Discussed with the primary care physician, and we will admit the patient to the hospital with IV fluids and sliding scale  insulin.    Amount and/or Complexity of Data Reviewed  Labs: ordered.  Radiology: ordered.    Risk  OTC drugs.  Prescription drug management.               ED Course as of 10/03/24 1652   Thu Oct 03, 2024   1631 Blood sugar remains elevated.  This will probably require overnight stay with IV fluids as well as continued sliding scale insulin.  The patient does not have much insulin left in her insulin pump and as such it would be appropriate to admit the patient for IV fluids as well as sliding scale insulin.  I have called the primary care physician's office and I am awaiting a call back from them for admission. [PG]      ED Course User Index  [PG] Melvin Mallory MD                           Clinical Impression:  Final diagnoses:  [E10.65] Hyperglycemia due to type 1 diabetes mellitus (Primary)          ED Disposition Condition    Observation Stable                Melvin Mallory MD  10/03/24 1652

## 2024-10-03 NOTE — ED NOTES
Patient ambulated to ER with steady gait.  Reports that three days ago (approx) she placed on steroids and now her glucose meter is reading >500 high.  Pt has a disability that makes it difficult to get a clear picture of symptoms and pain.  Using the faces scale pt states that her poop is a 10/10.  Mother believes this to mean her lower stomach and that she has been having diarrhea as well.

## 2024-10-03 NOTE — ED NOTES
Pt finished 2nd liter of fluid and glucose monitor still confirms sugar too high to read. Notified Dr. Mallory

## 2024-10-03 NOTE — PLAN OF CARE
Problem: Adult Inpatient Plan of Care  Goal: Plan of Care Review  Outcome: Progressing  Goal: Patient-Specific Goal (Individualized)  Outcome: Progressing  Goal: Absence of Hospital-Acquired Illness or Injury  Outcome: Progressing  Goal: Optimal Comfort and Wellbeing  Outcome: Progressing  Goal: Readiness for Transition of Care  Outcome: Progressing     Problem: Diabetes Comorbidity  Goal: Blood Glucose Level Within Targeted Range  Outcome: Progressing     Problem: Bariatric Environmental Safety  Goal: Safety Maintained with Care  Outcome: Progressing

## 2024-10-04 VITALS
TEMPERATURE: 98 F | DIASTOLIC BLOOD PRESSURE: 77 MMHG | OXYGEN SATURATION: 95 % | WEIGHT: 218.5 LBS | HEIGHT: 58 IN | BODY MASS INDEX: 45.87 KG/M2 | RESPIRATION RATE: 18 BRPM | HEART RATE: 94 BPM | SYSTOLIC BLOOD PRESSURE: 124 MMHG

## 2024-10-04 LAB
ANION GAP SERPL CALC-SCNC: 8 MEQ/L
BASOPHILS # BLD AUTO: 0.05 X10(3)/MCL
BASOPHILS NFR BLD AUTO: 1.4 %
BUN SERPL-MCNC: 14 MG/DL (ref 7–18.7)
CALCIUM SERPL-MCNC: 8.7 MG/DL (ref 8.4–10.2)
CHLORIDE SERPL-SCNC: 107 MMOL/L (ref 98–107)
CO2 SERPL-SCNC: 26 MMOL/L (ref 22–29)
CREAT SERPL-MCNC: 0.85 MG/DL (ref 0.55–1.02)
CREAT/UREA NIT SERPL: 16
EOSINOPHIL # BLD AUTO: 0.05 X10(3)/MCL (ref 0–0.9)
EOSINOPHIL NFR BLD AUTO: 1.4 %
ERYTHROCYTE [DISTWIDTH] IN BLOOD BY AUTOMATED COUNT: 13.7 % (ref 11.5–17)
GFR SERPLBLD CREATININE-BSD FMLA CKD-EPI: >60 ML/MIN/1.73/M2
GLUCOSE SERPL-MCNC: 179 MG/DL (ref 74–100)
HCT VFR BLD AUTO: 40.1 % (ref 37–47)
HGB BLD-MCNC: 12.8 G/DL (ref 12–16)
IMM GRANULOCYTES # BLD AUTO: 0.01 X10(3)/MCL (ref 0–0.04)
IMM GRANULOCYTES NFR BLD AUTO: 0.3 %
LYMPHOCYTES # BLD AUTO: 1.35 X10(3)/MCL (ref 0.6–4.6)
LYMPHOCYTES NFR BLD AUTO: 36.8 %
MCH RBC QN AUTO: 30.7 PG (ref 27–31)
MCHC RBC AUTO-ENTMCNC: 31.9 G/DL (ref 33–36)
MCV RBC AUTO: 96.2 FL (ref 80–94)
MONOCYTES # BLD AUTO: 0.34 X10(3)/MCL (ref 0.1–1.3)
MONOCYTES NFR BLD AUTO: 9.3 %
NEUTROPHILS # BLD AUTO: 1.87 X10(3)/MCL (ref 2.1–9.2)
NEUTROPHILS NFR BLD AUTO: 50.8 %
NRBC BLD AUTO-RTO: 0 %
PLATELET # BLD AUTO: 204 X10(3)/MCL (ref 130–400)
PMV BLD AUTO: 9.7 FL (ref 7.4–10.4)
POCT GLUCOSE: 218 MG/DL (ref 70–110)
POCT GLUCOSE: 237 MG/DL (ref 70–110)
POCT GLUCOSE: 272 MG/DL (ref 70–110)
POCT GLUCOSE: 350 MG/DL (ref 70–110)
POCT GLUCOSE: 425 MG/DL (ref 70–110)
POCT GLUCOSE: 476 MG/DL (ref 70–110)
POCT GLUCOSE: >500 MG/DL (ref 70–110)
POCT GLUCOSE: >500 MG/DL (ref 70–110)
POTASSIUM SERPL-SCNC: 3.8 MMOL/L (ref 3.5–5.1)
RBC # BLD AUTO: 4.17 X10(6)/MCL (ref 4.2–5.4)
SODIUM SERPL-SCNC: 141 MMOL/L (ref 136–145)
WBC # BLD AUTO: 3.67 X10(3)/MCL (ref 4.5–11.5)

## 2024-10-04 PROCEDURE — 80048 BASIC METABOLIC PNL TOTAL CA: CPT | Performed by: GENERAL PRACTICE

## 2024-10-04 PROCEDURE — 25000003 PHARM REV CODE 250: Performed by: GENERAL PRACTICE

## 2024-10-04 PROCEDURE — 85025 COMPLETE CBC W/AUTO DIFF WBC: CPT | Performed by: GENERAL PRACTICE

## 2024-10-04 PROCEDURE — 63600175 PHARM REV CODE 636 W HCPCS: Performed by: FAMILY MEDICINE

## 2024-10-04 PROCEDURE — G0378 HOSPITAL OBSERVATION PER HR: HCPCS

## 2024-10-04 PROCEDURE — 36415 COLL VENOUS BLD VENIPUNCTURE: CPT | Performed by: GENERAL PRACTICE

## 2024-10-04 PROCEDURE — 94761 N-INVAS EAR/PLS OXIMETRY MLT: CPT

## 2024-10-04 PROCEDURE — 99238 HOSP IP/OBS DSCHRG MGMT 30/<: CPT | Mod: ,,, | Performed by: FAMILY MEDICINE

## 2024-10-04 PROCEDURE — 25000003 PHARM REV CODE 250: Performed by: FAMILY MEDICINE

## 2024-10-04 PROCEDURE — 96372 THER/PROPH/DIAG INJ SC/IM: CPT | Performed by: FAMILY MEDICINE

## 2024-10-04 RX ORDER — SULFAMETHOXAZOLE AND TRIMETHOPRIM 800; 160 MG/1; MG/1
1 TABLET ORAL 2 TIMES DAILY
Qty: 14 TABLET | Refills: 0 | Status: SHIPPED | OUTPATIENT
Start: 2024-10-04 | End: 2024-10-11

## 2024-10-04 RX ORDER — ONDANSETRON 4 MG/1
4 TABLET, ORALLY DISINTEGRATING ORAL EVERY 6 HOURS PRN
Qty: 15 TABLET | Refills: 0 | Status: SHIPPED | OUTPATIENT
Start: 2024-10-04 | End: 2024-10-11

## 2024-10-04 RX ORDER — INSULIN ASPART 100 [IU]/ML
0-10 INJECTION, SOLUTION INTRAVENOUS; SUBCUTANEOUS
Status: DISCONTINUED | OUTPATIENT
Start: 2024-10-04 | End: 2024-10-04 | Stop reason: HOSPADM

## 2024-10-04 RX ADMIN — INSULIN ASPART 2 UNITS: 100 INJECTION, SOLUTION INTRAVENOUS; SUBCUTANEOUS at 04:10

## 2024-10-04 RX ADMIN — INSULIN ASPART 2 UNITS: 100 INJECTION, SOLUTION INTRAVENOUS; SUBCUTANEOUS at 02:10

## 2024-10-04 RX ADMIN — SULFAMETHOXAZOLE AND TRIMETHOPRIM 1 TABLET: 800; 160 TABLET ORAL at 09:10

## 2024-10-04 RX ADMIN — SODIUM CHLORIDE: 9 INJECTION, SOLUTION INTRAVENOUS at 02:10

## 2024-10-04 RX ADMIN — ONDANSETRON 4 MG: 2 INJECTION INTRAMUSCULAR; INTRAVENOUS at 03:10

## 2024-10-04 RX ADMIN — INSULIN ASPART 4 UNITS: 100 INJECTION, SOLUTION INTRAVENOUS; SUBCUTANEOUS at 12:10

## 2024-10-04 RX ADMIN — INSULIN ASPART 10 UNITS: 100 INJECTION, SOLUTION INTRAVENOUS; SUBCUTANEOUS at 11:10

## 2024-10-04 RX ADMIN — INSULIN ASPART 6 UNITS: 100 INJECTION, SOLUTION INTRAVENOUS; SUBCUTANEOUS at 09:10

## 2024-10-04 NOTE — DISCHARGE SUMMARY
[facility] - Medical Surgical Unit  [facility] Medicine  Discharge Summary      Patient Name: Arlin Daniels  MRN: [id number]  BRODERICK: [id number]  Patient Class: OP- Observation  Admission Date: 10/3/2024  Hospital Length of Stay: 0 days  Discharge Date and Time:  10/04/2024 1:09 PM  Attending Physician: Ferny Whiting MD   Discharging Provider: Ferny Whiting MD  Primary Care Provider: Ferny Whiting MD    Primary Care Team: Networked reference to record PCT     HPI:   Chief Complaint   Patient presents with    Hyperglycemia       Patient having dizziness and high blood sugar after taken steroids for upper resp infection.      Patient having dizziness and high blood sugar after taken steroids for upper resp infection. Type 1 Diabetic with an insulin pump. Blood sugar reading high and the insulin pump did not help it come down.  Patient with recent upper respiratory infection and started on Medrol Dosepak.  Patient unable to keep sugars controlled past that point.  Patient presented to ER for further evaluation.  Workup revealed patient to not be in DKA but ER physician was unable to get glucose to trend down on a timely manner.  Patient will be admitted for IV fluids and insulin sliding scale in order to return glucose back to normal.    * No surgery found *      Hospital Course:   10/04/2024:  Patient has returned back to baseline.  CBGS running in 200s.  Patient is tolerating diet.  Rx Bactrim DS b.i.d. x1 week sent to pharmacy (left breast abscess).  Rx for Zofran 4 mg ODT p.r.n. nausea.  Patient has restarted insulin pump.  Monitor CBGS.  Diet modification discussed.  We will follow up with patient in clinic on 10/08/2024.  ER precautions discussed.     Goals of Care Treatment Preferences:  Code Status: Full Code    Physical Exam  Constitutional:       General: She is not in acute distress.  Cardiovascular:      Rate and Rhythm: Normal rate and regular rhythm.      Pulses: Normal pulses.       Heart sounds: Normal heart sounds.   Skin:     General: Skin is warm.   Neurological:      Mental Status: She is alert.   Psychiatric:         Mood and Affect: Mood normal.         Thought Content: Thought content normal.         SDOH Screening:  The patient was screened for utility difficulties, food insecurity, transport difficulties, housing insecurity, and interpersonal safety and there were no concerns identified this admission.     Consults:     Endocrine  * Hyperglycemia due to type 1 diabetes mellitus  Continue home medication   Monitor CBGS   Follow up patient in clinic on 10/08/2024        Final Active Diagnoses:    Diagnosis Date Noted POA    PRINCIPAL PROBLEM:  Hyperglycemia due to type 1 diabetes mellitus [E10.65] 10/03/2024 Unknown    Hypothyroidism [E03.9] 11/07/2019 Yes      Problems Resolved During this Admission:       Discharged Condition: good    Disposition: Home or Self Care    Follow Up:   Follow-up Information       Ferny Whiting MD. Go on 10/8/2024.    Specialty: Family Medicine  Why: @9:30am follow up appt  Contact information:  [address]  [place] [address] [phone]                           Patient Instructions:      Diet diabetic     Activity as tolerated       Significant Diagnostic Studies: Labs: All labs within the past 24 hours have been reviewed    Pending Diagnostic Studies:       None           Medications:  Reconciled Home Medications:      Medication List        START taking these medications      ondansetron 4 MG Tbdl  Commonly known as: ZOFRAN-ODT  Take 1 tablet (4 mg total) by mouth every 6 (six) hours as needed (nausea).     sulfamethoxazole-trimethoprim 800-160mg 800-160 mg Tab  Commonly known as: BACTRIM DS  Take 1 tablet by mouth 2 (two) times daily. for 7 days            CONTINUE taking these medications      albuterol 1.25 mg/3 mL Nebu  Commonly known as: ACCUNEB  Take 3 mLs (1.25 mg total) by nebulization every 6 (six) hours as needed (wheezing).    "  ARIPiprazole 2 MG Tab  Commonly known as: ABILIFY  Take 1 tablet (2 mg total) by mouth once daily.     BAQSIMI 3 mg/actuation Spry  Generic drug: glucagon  by Each Nostril route.     * blood sugar diagnostic Strp  Check blood sugar 6 - 8  times daily     * ONETOUCH VERIO TEST STRIPS Strp  Generic drug: blood sugar diagnostic     DEXCOM G6  Misc  Generic drug: blood-glucose meter,continuous  Use as directed.     DEXCOM G6 SENSOR Callie  Generic drug: blood-glucose sensor  Use as directed.  3 sensors per month.     DEXCOM G6 TRANSMITTER Callie  Generic drug: blood-glucose transmitter  Use as directed.  4 per year.     DULoxetine 60 MG capsule  Commonly known as: CYMBALTA  TAKE ONE CAPSULE BY MOUTH ONCE DAILY     fluticasone propionate 50 mcg/actuation nasal spray  Commonly known as: FLONASE  USE 2 SPRAYS IN EACH NOSTRIL DAILY     HumaLOG KwikPen Insulin 100 unit/mL pen  Generic drug: insulin lispro  Inject into the skin.     insulin glargine U-100 (Lantus) 100 unit/mL injection  Inject into the skin.     * KETONE URINE TEST Strp  Generic drug: acetone (urine) test  Check when blood sugars are above 240 mg/dl     * KETOSTIX strip  Generic drug: acetone (urine) test     lancets 33 gauge Misc  Check blood sugar 6 - 8  times daily     levothyroxine 100 MCG tablet  Commonly known as: SYNTHROID  Take 100 mcg by mouth once daily.     loratadine 10 mg tablet  Commonly known as: CLARITIN  TAKE ONE TABLET BY MOUTH DAILY FOR SINUS     mupirocin 2 % ointment  Commonly known as: BACTROBAN  APPLY topically THREE TIMES DAILY     OMNIPOD 5 G6 PODS (GEN 5) Crtg  Generic drug: insulin pump cart,automated,BT  SMARTSIG:SUB-Q Every Other Day     pen needle, diabetic 32 gauge x 5/32" Ndle  USE AS DIRECTED FOR 6 INJECTIONS DAILY           * This list has 4 medication(s) that are the same as other medications prescribed for you. Read the directions carefully, and ask your doctor or other care provider to review them with you.       "            Indwelling Lines/Drains at time of discharge:   Lines/Drains/Airways       None                   Time spent on the discharge of patient: 45 minutes         Ferny Whiting MD  Department of Hospital Medicine  [facility]

## 2024-10-04 NOTE — PLAN OF CARE
10/04/24 1818   Discharge Assessment   Assessment Type Discharge Planning Assessment   Confirmed/corrected address, phone number and insurance Yes   Confirmed Demographics Correct on Facesheet   Source of Information patient   Does patient/caregiver understand observation status Yes   Communicated MANOHAR with patient/caregiver Yes   Reason For Admission Hyperglycemia   People in Home parent(s)   Facility Arrived From: Home   Do you expect to return to your current living situation? Yes   Do you have help at home or someone to help you manage your care at home? Yes   Who are your caregiver(s) and their phone number(s)? Mila Melendez (Mother)  [phone]   Prior to hospitilization cognitive status: Alert/Oriented   Current cognitive status: Alert/Oriented   Walking or Climbing Stairs Difficulty no   Dressing/Bathing Difficulty no   Equipment Currently Used at Home none   Readmission within 30 days? No   Do you currently have service(s) that help you manage your care at home? No   Do you take prescription medications? Yes   Do you have prescription coverage? Yes   Coverage Medicaid   Do you have any problems affording any of your prescribed medications? No   Is the patient taking medications as prescribed? yes   Who is going to help you get home at discharge? Mila Melendez (Mother)  [phone]   How do you get to doctors appointments? family or friend will provide;car, drives self   Are you on dialysis? No   Do you take coumadin? No   Discharge Plan A Home   DME Needed Upon Discharge  none   Discharge Plan discussed with: Patient   Transition of Care Barriers None   Physical Activity   On average, how many days per week do you engage in moderate to strenuous exercise (like a brisk walk)? 1 day   On average, how many minutes do you engage in exercise at this level? 10 min   Financial Resource Strain   How hard is it for you to pay for the very basics like food, housing, medical care, and heating? Not hard   Housing  Stability   In the last 12 months, was there a time when you were not able to pay the mortgage or rent on time? N   At any time in the past 12 months, were you homeless or living in a shelter (including now)? N   Transportation Needs   Has the lack of transportation kept you from medical appointments, meetings, work or from getting things needed for daily living? No   Food Insecurity   Within the past 12 months, you worried that your food would run out before you got the money to buy more. Never true   Within the past 12 months, the food you bought just didn't last and you didn't have money to get more. Never true   Stress   Do you feel stress - tense, restless, nervous, or anxious, or unable to sleep at night because your mind is troubled all the time - these days? Not at all   Social Isolation   How often do you feel lonely or isolated from those around you?  Never   Alcohol Use   Q1: How often do you have a drink containing alcohol? Never   Q2: How many drinks containing alcohol do you have on a typical day when you are drinking? None   Q3: How often do you have six or more drinks on one occasion? Never   Utilities   In the past 12 months has the electric, gas, oil, or water company threatened to shut off services in your home? No   Health Literacy   How often do you need to have someone help you when you read instructions, pamphlets, or other written material from your doctor or pharmacy? Never

## 2024-10-04 NOTE — HOSPITAL COURSE
10/04/2024:  Patient has returned back to baseline.  CBGS running in 200s.  Patient is tolerating diet.  Rx Bactrim DS b.i.d. x1 week sent to pharmacy (left breast abscess).  Rx for Zofran 4 mg ODT p.r.n. nausea.  Patient has restarted insulin pump.  Monitor CBGS.  Diet modification discussed.  We will follow up with patient in clinic on 10/08/2024.  ER precautions discussed.

## 2024-10-04 NOTE — PLAN OF CARE
Problem: Adult Inpatient Plan of Care  Goal: Plan of Care Review  10/4/2024 1850 by Unique Patel RN  Outcome: Met  10/4/2024 1847 by Unique Patel RN  Outcome: Progressing  Goal: Patient-Specific Goal (Individualized)  10/4/2024 1850 by Unique Patel RN  Outcome: Met  10/4/2024 1847 by Unique Patel RN  Outcome: Progressing  Goal: Absence of Hospital-Acquired Illness or Injury  10/4/2024 1850 by Unique Patel RN  Outcome: Met  10/4/2024 1847 by Unique Patel RN  Outcome: Progressing  Goal: Optimal Comfort and Wellbeing  10/4/2024 1850 by Unique Patel RN  Outcome: Met  10/4/2024 1847 by Unique Patel RN  Outcome: Progressing  Goal: Readiness for Transition of Care  10/4/2024 1850 by Unique Patel RN  Outcome: Met  10/4/2024 1847 by Unique Patel RN  Outcome: Progressing     Problem: Diabetes Comorbidity  Goal: Blood Glucose Level Within Targeted Range  10/4/2024 1850 by Unique Patel RN  Outcome: Met  10/4/2024 1847 by Unique Patel RN  Outcome: Progressing     Problem: Bariatric Environmental Safety  Goal: Safety Maintained with Care  10/4/2024 1850 by Unique Patel RN  Outcome: Met  10/4/2024 1847 by Unique Patel RN  Outcome: Progressing     Problem: Fall Injury Risk  Goal: Absence of Fall and Fall-Related Injury  10/4/2024 1850 by Unique Patel RN  Outcome: Met  10/4/2024 1847 by Unique Patel RN  Outcome: Progressing     Problem: Pain Acute  Goal: Optimal Pain Control and Function  10/4/2024 1850 by Unique Patel RN  Outcome: Met  10/4/2024 1847 by Unique Patel RN  Outcome: Progressing     Problem: Glycemic Control Impaired  Goal: Blood Glucose Level Within Targeted Range  10/4/2024 1850 by Unique Patel RN  Outcome: Met  10/4/2024 1847 by Unique Patel RN  Outcome: Progressing  Goal: Minimize Risk of Hypoglycemia  10/4/2024 1850 by Unique Patel RN  Outcome: Met  10/4/2024 1847 by Unique Patel, RN  Outcome: Progressing

## 2024-10-04 NOTE — PLAN OF CARE
Problem: Adult Inpatient Plan of Care  Goal: Plan of Care Review  Outcome: Progressing  Goal: Patient-Specific Goal (Individualized)  Outcome: Progressing  Goal: Absence of Hospital-Acquired Illness or Injury  Outcome: Progressing  Goal: Optimal Comfort and Wellbeing  Outcome: Progressing  Goal: Readiness for Transition of Care  Outcome: Progressing     Problem: Diabetes Comorbidity  Goal: Blood Glucose Level Within Targeted Range  Outcome: Progressing     Problem: Bariatric Environmental Safety  Goal: Safety Maintained with Care  Outcome: Progressing     Problem: Fall Injury Risk  Goal: Absence of Fall and Fall-Related Injury  Outcome: Progressing     Problem: Pain Acute  Goal: Optimal Pain Control and Function  Outcome: Progressing     Problem: Glycemic Control Impaired  Goal: Blood Glucose Level Within Targeted Range  Outcome: Progressing  Goal: Minimize Risk of Hypoglycemia  Outcome: Progressing

## 2024-10-05 LAB — POCT GLUCOSE: 184 MG/DL (ref 70–110)

## 2024-10-07 DIAGNOSIS — E10.9 TYPE 1 DIABETES MELLITUS WITHOUT COMPLICATIONS: ICD-10-CM

## 2024-10-07 RX ORDER — BLOOD-GLUCOSE METER
EACH MISCELLANEOUS
Qty: 200 STRIP | Refills: 3 | Status: SHIPPED | OUTPATIENT
Start: 2024-10-07

## 2024-10-08 ENCOUNTER — OFFICE VISIT (OUTPATIENT)
Dept: FAMILY MEDICINE | Facility: CLINIC | Age: 24
End: 2024-10-08
Payer: MEDICAID

## 2024-10-08 VITALS
RESPIRATION RATE: 18 BRPM | DIASTOLIC BLOOD PRESSURE: 72 MMHG | HEIGHT: 58 IN | WEIGHT: 222 LBS | TEMPERATURE: 97 F | HEART RATE: 99 BPM | SYSTOLIC BLOOD PRESSURE: 120 MMHG | BODY MASS INDEX: 46.6 KG/M2 | OXYGEN SATURATION: 100 %

## 2024-10-08 DIAGNOSIS — E11.9 TYPE 2 DIABETES MELLITUS WITHOUT COMPLICATION, WITHOUT LONG-TERM CURRENT USE OF INSULIN: Primary | ICD-10-CM

## 2024-10-08 DIAGNOSIS — H61.22 HEARING LOSS OF LEFT EAR DUE TO CERUMEN IMPACTION: ICD-10-CM

## 2024-10-08 PROCEDURE — 3066F NEPHROPATHY DOC TX: CPT | Mod: CPTII,,, | Performed by: FAMILY MEDICINE

## 2024-10-08 PROCEDURE — 3074F SYST BP LT 130 MM HG: CPT | Mod: CPTII,,, | Performed by: FAMILY MEDICINE

## 2024-10-08 PROCEDURE — 3008F BODY MASS INDEX DOCD: CPT | Mod: CPTII,,, | Performed by: FAMILY MEDICINE

## 2024-10-08 PROCEDURE — 1159F MED LIST DOCD IN RCRD: CPT | Mod: CPTII,,, | Performed by: FAMILY MEDICINE

## 2024-10-08 PROCEDURE — 3051F HG A1C>EQUAL 7.0%<8.0%: CPT | Mod: CPTII,,, | Performed by: FAMILY MEDICINE

## 2024-10-08 PROCEDURE — 3078F DIAST BP <80 MM HG: CPT | Mod: CPTII,,, | Performed by: FAMILY MEDICINE

## 2024-10-08 PROCEDURE — 3061F NEG MICROALBUMINURIA REV: CPT | Mod: CPTII,,, | Performed by: FAMILY MEDICINE

## 2024-10-08 PROCEDURE — 99214 OFFICE O/P EST MOD 30 MIN: CPT | Mod: ,,, | Performed by: FAMILY MEDICINE

## 2024-10-08 PROCEDURE — 1160F RVW MEDS BY RX/DR IN RCRD: CPT | Mod: CPTII,,, | Performed by: FAMILY MEDICINE

## 2024-10-08 NOTE — PROGRESS NOTES
"Subjective:      Patient ID: Arlin Daniels is a 24 y.o. female.    Chief Complaint: TCM [facility] d/c 10/4 (Patient went to ER for Hyperglycemia.)      TCM      HPI:        Chief Complaint   Patient presents with    Hyperglycemia       Patient having dizziness and high blood sugar after taken steroids for upper resp infection.      Patient having dizziness and high blood sugar after taken steroids for upper resp infection. Type 1 Diabetic with an insulin pump. Blood sugar reading high and the insulin pump did not help it come down.  Patient with recent upper respiratory infection and started on Medrol Dosepak.  Patient unable to keep sugars controlled past that point.  Patient presented to ER for further evaluation.  Workup revealed patient to not be in DKA but ER physician was unable to get glucose to trend down on a timely manner.  Patient will be admitted for IV fluids and insulin sliding scale in order to return glucose back to normal.     * No surgery found *       Hospital Course:   10/04/2024:  Patient has returned back to baseline.  CBGS running in 200s.  Patient is tolerating diet.  Rx Bactrim DS b.i.d. x1 week sent to pharmacy (left breast abscess).  Rx for Zofran 4 mg ODT p.r.n. nausea.  Patient has restarted insulin pump.  Monitor CBGS.  Diet modification discussed.  We will follow up with patient in clinic on 10/08/2024.  ER precautions discussed.       Diabetes  - tolerating medication (no side-effects)  - DXT: 100-130's  - watching diet    Review of Systems   Constitutional: Negative.    HENT:  Positive for ear pain (left).    Respiratory: Negative.     Cardiovascular: Negative.    Gastrointestinal: Negative.    Psychiatric/Behavioral: Negative.           Objective:     /72 (BP Location: Left arm, Patient Position: Sitting)   Pulse 99   Temp 97 °F (36.1 °C)   Resp 18   Ht 4' 10" (1.473 m)   Wt 100.7 kg (222 lb)   LMP 10/07/2024 (Exact Date)   SpO2 100%   BMI 46.40 kg/m²    Physical " Exam  Constitutional:       Appearance: Normal appearance.   HENT:      Right Ear: Tympanic membrane and ear canal normal.      Left Ear: There is impacted cerumen.   Cardiovascular:      Rate and Rhythm: Normal rate and regular rhythm.      Heart sounds: Normal heart sounds.   Pulmonary:      Effort: Pulmonary effort is normal.      Breath sounds: Normal breath sounds.   Neurological:      Mental Status: She is alert.   Psychiatric:         Mood and Affect: Mood normal.         Behavior: Behavior normal.         Thought Content: Thought content normal.         Judgment: Judgment normal.             Assessment:     Problem List Items Addressed This Visit          Endocrine    Diabetes mellitus - Primary     Other Visit Diagnoses       Hearing loss of left ear due to cerumen impaction                 Plan:   1. Type 2 diabetes mellitus without complication, without long-term current use of insulin  Improving  Monitor CBGS   Keep scheduled follow up with Endocrinology   Return to clinic with any concerns     2. Hearing loss of left ear due to cerumen impaction  Cerumen Impaction left  *Procedure Note*  Posterior traction applied on helix  Bionix lighted ear currette placed in ear canal  Cerumen impaction removed  Ear canal re-examined  Canal normal  Tympanic membrane clear

## 2024-11-04 DIAGNOSIS — L02.91 ABSCESS: ICD-10-CM

## 2024-11-04 RX ORDER — MUPIROCIN 20 MG/G
OINTMENT TOPICAL 3 TIMES DAILY
Qty: 22 G | Refills: 0 | Status: SHIPPED | OUTPATIENT
Start: 2024-11-04

## 2024-11-08 ENCOUNTER — OFFICE VISIT (OUTPATIENT)
Dept: FAMILY MEDICINE | Facility: CLINIC | Age: 24
End: 2024-11-08
Payer: MEDICAID

## 2024-11-08 ENCOUNTER — LAB VISIT (OUTPATIENT)
Dept: LAB | Facility: HOSPITAL | Age: 24
End: 2024-11-08
Payer: MEDICAID

## 2024-11-08 VITALS — OXYGEN SATURATION: 95 % | HEART RATE: 90 BPM | TEMPERATURE: 100 F

## 2024-11-08 DIAGNOSIS — J40 BRONCHITIS: ICD-10-CM

## 2024-11-08 DIAGNOSIS — J01.40 ACUTE NON-RECURRENT PANSINUSITIS: Primary | ICD-10-CM

## 2024-11-08 DIAGNOSIS — L02.91 ABSCESS: ICD-10-CM

## 2024-11-08 DIAGNOSIS — J01.40 ACUTE NON-RECURRENT PANSINUSITIS: ICD-10-CM

## 2024-11-08 LAB
B PERT.PT PRMT NPH QL NAA+NON-PROBE: NOT DETECTED
C PNEUM DNA NPH QL NAA+NON-PROBE: NOT DETECTED
FLUAV AG UPPER RESP QL IA.RAPID: NOT DETECTED
FLUBV AG UPPER RESP QL IA.RAPID: NOT DETECTED
HADV DNA NPH QL NAA+NON-PROBE: NOT DETECTED
HCOV 229E RNA NPH QL NAA+NON-PROBE: NOT DETECTED
HCOV HKU1 RNA NPH QL NAA+NON-PROBE: NOT DETECTED
HCOV NL63 RNA NPH QL NAA+NON-PROBE: NOT DETECTED
HCOV OC43 RNA NPH QL NAA+NON-PROBE: NOT DETECTED
HMPV RNA NPH QL NAA+NON-PROBE: NOT DETECTED
HPIV1 RNA NPH QL NAA+NON-PROBE: NOT DETECTED
HPIV2 RNA NPH QL NAA+NON-PROBE: NOT DETECTED
HPIV3 RNA NPH QL NAA+NON-PROBE: NOT DETECTED
HPIV4 RNA NPH QL NAA+NON-PROBE: NOT DETECTED
M PNEUMO DNA NPH QL NAA+NON-PROBE: NOT DETECTED
RSV RNA NPH QL NAA+NON-PROBE: NOT DETECTED
RV+EV RNA NPH QL NAA+NON-PROBE: DETECTED
SARS-COV-2 RNA RESP QL NAA+PROBE: NOT DETECTED

## 2024-11-08 PROCEDURE — 87632 RESP VIRUS 6-11 TARGETS: CPT

## 2024-11-08 PROCEDURE — 0240U COVID/FLU A&B PCR: CPT

## 2024-11-08 RX ORDER — PROMETHAZINE HYDROCHLORIDE AND DEXTROMETHORPHAN HYDROBROMIDE 6.25; 15 MG/5ML; MG/5ML
5 SYRUP ORAL EVERY 4 HOURS PRN
Qty: 200 ML | Refills: 0 | Status: SHIPPED | OUTPATIENT
Start: 2024-11-08 | End: 2024-11-18

## 2024-11-08 RX ORDER — BENZONATATE 200 MG/1
200 CAPSULE ORAL 3 TIMES DAILY PRN
Qty: 30 CAPSULE | Refills: 0 | Status: CANCELLED | OUTPATIENT
Start: 2024-11-08 | End: 2024-11-18

## 2024-11-08 RX ORDER — DOXYCYCLINE 100 MG/1
100 CAPSULE ORAL EVERY 12 HOURS
Qty: 14 CAPSULE | Refills: 0 | Status: SHIPPED | OUTPATIENT
Start: 2024-11-08 | End: 2024-11-15

## 2024-11-08 RX ORDER — ALBUTEROL SULFATE 1.25 MG/3ML
1.25 SOLUTION RESPIRATORY (INHALATION) EVERY 6 HOURS PRN
Qty: 75 ML | Refills: 1 | Status: SHIPPED | OUTPATIENT
Start: 2024-11-08

## 2024-11-08 RX ORDER — GUAIFENESIN 600 MG/1
600 TABLET, EXTENDED RELEASE ORAL 2 TIMES DAILY
Qty: 30 TABLET | Refills: 0 | Status: SHIPPED | OUTPATIENT
Start: 2024-11-08 | End: 2024-11-23

## 2024-11-08 NOTE — ASSESSMENT & PLAN NOTE
Rx for Doxycycline 100 mg twice daily by mouth for 7 days   Apply Mupirocin BID for 7 days.  Apply warm compress to affected area four times a day for 10 minutes at a time.  Clean with mild soap and water b.i.d.  Wound care as discussed.    Wash your hands thoroughly before and after touching affected area.   Return to clinic if symptoms fail to improve or worsen.

## 2024-11-08 NOTE — ASSESSMENT & PLAN NOTE
Order for COVID/FLU swab + respiratory panel to be completed  Start doxycycline 100 mg by mouth b.i.d. x7 days + Mucinex 600 mg by mouth b.i.d. p.r.n. congestion  Educated on importance of completing full course of antibiotic regimen  Treat fever/pain with acetaminophen or ibuprofen as needed - take according to package directions  Avoid Irritants and allergens.  Wash hands frequently to prevent common colds.  Drink plenty of fluids to thin mucous and/or use humidifier.    Consider NeilMed Saline Sinus Rinse BID.  Apply warm compress for sinus pain.  Use OTC decongestants as needed (HTN patients to avoid sudafed products).   Return to clinic with any concerns.

## 2024-11-08 NOTE — ASSESSMENT & PLAN NOTE
Start doxycycline 100 mg by mouth b.i.d. x7 days + albuterol nebs inhaled every 6 hours p.r.n. wheezing/SOB + promethazine DM take 5 mL by mouth every 4 hours p.r.n. cough   Discussed possible CXR d/t SOB; patient/mother defer at this time, reports will call if SOB worsens and will continue to monitor home O2 sat.  Report to ER with hypoxia < 92%, difficulty breathing, or CP.  Educated on importance of completing full course of antibiotic regimen  Treat fever/pain with acetaminophen or ibuprofen as needed - take according to package directions  Wash hands frequently to prevent common colds.  Drink plenty of fluids to thin mucous and/or use humidifier.    Avoid/limit triggers such as pollen, dust, mold and animal dander.  Avoid smoke, strong chemicals, and strong cleaning products in addition to strong perfumes/colognes.  Use nebulizer for wheezing or URI.  Return to clinic with any concerns.

## 2024-11-08 NOTE — PROGRESS NOTES
Family Medicine      Patient ID: [id number]     Chief Complaint: Nasal Congestion (X1 day), Cough (X1 day), Shortness of Breath (X1 day), and Abscess (Located on right breast x3 days)    HPI:     This is a telemedicine note. Patient was treated using telemedicine, real time audio and video, according to [facility] protocols. Sravani DRUMMOND NP, conducted the visit from the [facility]. The patient participated in the visit at a non-[facility] location selected by the patient, identified below. I am licensed in the state where the patient stated they are located. The patient stated that they understood and accepted the privacy and security risks to their information at their location. This visit is not recorded.    Patient was located at the patient's home.      274}    Arlin Daniels is a 24 y.o. female here today for a telemedicine visit.  Patient mother present during telemedicine visit. Complaining of fevers up to 100.0 degrees, hot and cold spells, and sweats, chest congestion, chills, headache, myalgias, nasal blockage, yellow nasal discharge, post nasal drip, productive cough, shortness of breath, sinus and nasal congestion, bilateral sinus pain, and sore throat  x 2 days.  Denies difficulty breathing or CP. Admits to family members with similar symptoms.     Additional complaint of wound to right breast x approximately 3 days.  Reports purulent drainage, redness, warmth, and tenderness to palpation to right lower breast.  Reports history of recurrent abscess in this same area, previously prescribed Bactroban and Bactrim DS which typically resolves symptoms without needing incision and drainage.  Reports minimal relief with applying Bactroban for the past few days and washing with mild soap and water.     Past Medical History:   Diagnosis Date    Diabetes mellitus     Down syndrome     Heart valve insufficiency     Hypothyroidism     Psoriasis         Past Surgical History:   Procedure Laterality Date  "   TUMOR REMOVAL          Social History     Tobacco Use    Smoking status: Never    Smokeless tobacco: Never   Substance and Sexual Activity    Alcohol use: Not Currently    Drug use: Not Currently    Sexual activity: Not Currently        Current Outpatient Medications   Medication Instructions    acetone, urine, test (KETONE URINE TEST) Strp Check when blood sugars are above 240 mg/dl    albuterol (ACCUNEB) 1.25 mg, Nebulization, Every 6 hours PRN    ARIPiprazole (ABILIFY) 2 mg, Oral, Daily    BAQSIMI 3 mg/actuation Spry by Each Nostril route.    blood sugar diagnostic Strp Check blood sugar 6 - 8  times daily    blood-glucose meter,continuous (DEXCOM G6 ) Misc Use as directed.    blood-glucose sensor (DEXCOM G6 SENSOR) Callie Use as directed.  3 sensors per month.    blood-glucose transmitter (DEXCOM G6 TRANSMITTER) Callie Use as directed.  4 per year.    doxycycline (MONODOX) 100 mg, Oral, Every 12 hours    DULoxetine (CYMBALTA) 60 MG capsule TAKE ONE CAPSULE BY MOUTH ONCE DAILY    fluticasone propionate (FLONASE) 50 mcg/actuation nasal spray USE 2 SPRAYS IN EACH NOSTRIL DAILY    guaiFENesin (MUCINEX) 600 mg, Oral, 2 times daily    HUMALOG KWIKPEN INSULIN 100 unit/mL pen Inject into the skin.    insulin glargine (LANTUS) 100 unit/mL injection Inject into the skin.    KETOSTIX strip No dose, route, or frequency recorded.    lancets 33 gauge Misc Check blood sugar 6 - 8  times daily    levothyroxine (SYNTHROID) 100 mcg, Daily    loratadine (CLARITIN) 10 mg tablet TAKE ONE TABLET BY MOUTH DAILY FOR SINUS    mupirocin (BACTROBAN) 2 % ointment 3 times daily, Apply to affected area    OMNIPOD 5 G6 PODS, GEN 5, Crtg SMARTSIG:SUB-Q Every Other Day    ONETOUCH VERIO TEST STRIPS Strp test blood sugar SIX TO EIGHT times PER DAY AS DIRECTED    pen needle, diabetic 32 gauge x 5/32" Ndle USE AS DIRECTED FOR 6 INJECTIONS DAILY    promethazine-dextromethorphan (PROMETHAZINE-DM) 6.25-15 mg/5 mL Syrp 5 mLs, Oral, Every 4 " hours PRN       Review of patient's allergies indicates:   Allergen Reactions    Medrol [methylprednisolone]         Patient Care Team:  Ferny Whiting MD as PCP - General (Family Medicine)  Julianne Shi MD as Consulting Physician (Pediatric Endocrinology)  Rebekah Aguirre MD as Obstetrician (Obstetrics)     Subjective:     Review of Systems    12 point review of systems conducted, negative except as stated in the history of present illness. See HPI for details.    Objective:     Visit Vitals  Pulse 90   Temp 99.7 °F (37.6 °C)   LMP 10/28/2024 (Approximate)   SpO2 95%       Physical Exam    Physical Exam: LIMITED DUE TO TELEMEDICINE RESTRICTIONS.  General: Alert and oriented, No acute distress.  Head: Normocephalic.  Eye: Sclera non-icteric.  Neck/Thyroid:  Full range of motion.  Respiratory: Non-labored respirations, Symmetrical chest wall expansion.  Musculoskeletal: Normal range of motion.  Integumentary:  Small wound to right lower breast at 6:00 a.m. with purulent drainage, surrounding erythema, and TTP.   Neurologic: No focal deficits  Psychiatric: Normal interaction, Coherent speech, Euthymic mood, Appropriate affect     Assessment:       ICD-10-CM ICD-9-CM   1. Acute non-recurrent pansinusitis  J01.40 461.8   2. Bronchitis  J40 490   3. Abscess  L02.91 682.9        Plan:     1. Acute non-recurrent pansinusitis  Assessment & Plan:  Order for COVID/FLU swab + respiratory panel to be completed  Start doxycycline 100 mg by mouth b.i.d. x7 days + Mucinex 600 mg by mouth b.i.d. p.r.n. congestion  Educated on importance of completing full course of antibiotic regimen  Treat fever/pain with acetaminophen or ibuprofen as needed - take according to package directions  Avoid Irritants and allergens.  Wash hands frequently to prevent common colds.  Drink plenty of fluids to thin mucous and/or use humidifier.    Consider NeilMed Saline Sinus Rinse BID.  Apply warm compress for sinus pain.  Use OTC  decongestants as needed (HTN patients to avoid sudafed products).   Return to clinic with any concerns.     Orders:  -     guaiFENesin (MUCINEX) 600 mg 12 hr tablet; Take 1 tablet (600 mg total) by mouth 2 (two) times daily. for 15 days  Dispense: 30 tablet; Refill: 0  -     doxycycline (MONODOX) 100 MG capsule; Take 1 capsule (100 mg total) by mouth every 12 (twelve) hours. for 7 days  Dispense: 14 capsule; Refill: 0  -     albuterol (ACCUNEB) 1.25 mg/3 mL Nebu; Take 3 mLs (1.25 mg total) by nebulization every 6 (six) hours as needed (wheezing).  Dispense: 75 mL; Refill: 1  -     promethazine-dextromethorphan (PROMETHAZINE-DM) 6.25-15 mg/5 mL Syrp; Take 5 mLs by mouth every 4 (four) hours as needed (cough).  Dispense: 200 mL; Refill: 0  -     COVID/FLU A&B PCR; Future; Expected date: 11/08/2024  -     Respiratory Panel; Future; Expected date: 11/08/2024    2. Bronchitis  Assessment & Plan:  Start doxycycline 100 mg by mouth b.i.d. x7 days + albuterol nebs inhaled every 6 hours p.r.n. wheezing/SOB + promethazine DM take 5 mL by mouth every 4 hours p.r.n. cough   Discussed possible CXR d/t SOB; patient/mother defer at this time, reports will call if SOB worsens and will continue to monitor home O2 sat.  Report to ER with hypoxia < 92%, difficulty breathing, or CP.  Educated on importance of completing full course of antibiotic regimen  Treat fever/pain with acetaminophen or ibuprofen as needed - take according to package directions  Wash hands frequently to prevent common colds.  Drink plenty of fluids to thin mucous and/or use humidifier.    Avoid/limit triggers such as pollen, dust, mold and animal dander.  Avoid smoke, strong chemicals, and strong cleaning products in addition to strong perfumes/colognes.  Use nebulizer for wheezing or URI.  Return to clinic with any concerns.    Orders:  -     guaiFENesin (MUCINEX) 600 mg 12 hr tablet; Take 1 tablet (600 mg total) by mouth 2 (two) times daily. for 15 days   Dispense: 30 tablet; Refill: 0  -     doxycycline (MONODOX) 100 MG capsule; Take 1 capsule (100 mg total) by mouth every 12 (twelve) hours. for 7 days  Dispense: 14 capsule; Refill: 0  -     albuterol (ACCUNEB) 1.25 mg/3 mL Nebu; Take 3 mLs (1.25 mg total) by nebulization every 6 (six) hours as needed (wheezing).  Dispense: 75 mL; Refill: 1  -     promethazine-dextromethorphan (PROMETHAZINE-DM) 6.25-15 mg/5 mL Syrp; Take 5 mLs by mouth every 4 (four) hours as needed (cough).  Dispense: 200 mL; Refill: 0  -     COVID/FLU A&B PCR; Future; Expected date: 11/08/2024  -     Respiratory Panel; Future; Expected date: 11/08/2024    3. Abscess  Assessment & Plan:  Rx for Doxycycline 100 mg twice daily by mouth for 7 days   Apply Mupirocin BID for 7 days.  Apply warm compress to affected area four times a day for 10 minutes at a time.  Clean with mild soap and water b.i.d.  Wound care as discussed.    Wash your hands thoroughly before and after touching affected area.   Return to clinic if symptoms fail to improve or worsen.      Orders:  -     doxycycline (MONODOX) 100 MG capsule; Take 1 capsule (100 mg total) by mouth every 12 (twelve) hours. for 7 days  Dispense: 14 capsule; Refill: 0         Follow up if symptoms worsen or fail to improve. In addition to their scheduled follow up, the patient has also been instructed to follow up on as needed basis.     No future appointments.     Video Time Documentation:  Spent 10 minutes with patient face to face discussed health concerns. More than 50% of this time was spent in counseling and coordination of care.    Sravani Rolle NP

## 2024-11-11 ENCOUNTER — TELEPHONE (OUTPATIENT)
Dept: FAMILY MEDICINE | Facility: CLINIC | Age: 24
End: 2024-11-11
Payer: MEDICAID

## 2024-11-11 NOTE — PROGRESS NOTES
Please inform patient of results.    1. COVID/flu swab negative.  Respiratory panel positive for rhinovirus/enterovirus which is a very common virus that causes upper respiratory symptoms.  Continue plan of care as discussed during last visit.

## 2024-11-11 NOTE — TELEPHONE ENCOUNTER
----- Message from Sravani Rolle NP sent at 11/11/2024  8:07 AM CST -----  Please inform patient of results.    1. COVID/flu swab negative.  Respiratory panel positive for rhinovirus/enterovirus which is a very common virus that causes upper respiratory symptoms.  Continue plan of care as discussed during last visit.

## 2024-11-18 DIAGNOSIS — T78.40XD ALLERGY, SUBSEQUENT ENCOUNTER: ICD-10-CM

## 2024-11-19 RX ORDER — FLUTICASONE PROPIONATE 50 MCG
SPRAY, SUSPENSION (ML) NASAL
Qty: 16 G | Refills: 3 | Status: SHIPPED | OUTPATIENT
Start: 2024-11-19

## 2024-12-30 ENCOUNTER — HOSPITAL ENCOUNTER (EMERGENCY)
Facility: HOSPITAL | Age: 24
Discharge: HOME | End: 2024-12-30
Attending: FAMILY MEDICINE
Payer: MEDICAID

## 2024-12-30 VITALS
HEART RATE: 78 BPM | BODY MASS INDEX: 46.18 KG/M2 | HEIGHT: 58 IN | SYSTOLIC BLOOD PRESSURE: 138 MMHG | WEIGHT: 220 LBS | DIASTOLIC BLOOD PRESSURE: 85 MMHG | TEMPERATURE: 97 F | OXYGEN SATURATION: 95 % | RESPIRATION RATE: 20 BRPM

## 2024-12-30 DIAGNOSIS — R56.9 SEIZURE-LIKE ACTIVITY: ICD-10-CM

## 2024-12-30 DIAGNOSIS — E16.2 HYPOGLYCEMIA: Primary | ICD-10-CM

## 2024-12-30 LAB
ANION GAP SERPL CALC-SCNC: 9 MEQ/L
BASOPHILS # BLD AUTO: 0.06 X10(3)/MCL
BASOPHILS NFR BLD AUTO: 1.5 %
BUN SERPL-MCNC: 13 MG/DL (ref 7–18.7)
CALCIUM SERPL-MCNC: 9.5 MG/DL (ref 8.4–10.2)
CHLORIDE SERPL-SCNC: 108 MMOL/L (ref 98–107)
CO2 SERPL-SCNC: 26 MMOL/L (ref 22–29)
CREAT SERPL-MCNC: 0.89 MG/DL (ref 0.55–1.02)
CREAT/UREA NIT SERPL: 15
EOSINOPHIL # BLD AUTO: 0.1 X10(3)/MCL (ref 0–0.9)
EOSINOPHIL NFR BLD AUTO: 2.5 %
ERYTHROCYTE [DISTWIDTH] IN BLOOD BY AUTOMATED COUNT: 15 % (ref 11.5–17)
GFR SERPLBLD CREATININE-BSD FMLA CKD-EPI: >60 ML/MIN/1.73/M2
GLUCOSE SERPL-MCNC: 143 MG/DL (ref 74–100)
HCT VFR BLD AUTO: 39.8 % (ref 37–47)
HGB BLD-MCNC: 13.3 G/DL (ref 12–16)
IMM GRANULOCYTES # BLD AUTO: 0 X10(3)/MCL (ref 0–0.04)
IMM GRANULOCYTES NFR BLD AUTO: 0 %
LYMPHOCYTES # BLD AUTO: 1.18 X10(3)/MCL (ref 0.6–4.6)
LYMPHOCYTES NFR BLD AUTO: 29.3 %
MCH RBC QN AUTO: 31.7 PG (ref 27–31)
MCHC RBC AUTO-ENTMCNC: 33.4 G/DL (ref 33–36)
MCV RBC AUTO: 95 FL (ref 80–94)
MONOCYTES # BLD AUTO: 0.37 X10(3)/MCL (ref 0.1–1.3)
MONOCYTES NFR BLD AUTO: 9.2 %
NEUTROPHILS # BLD AUTO: 2.32 X10(3)/MCL (ref 2.1–9.2)
NEUTROPHILS NFR BLD AUTO: 57.5 %
NRBC BLD AUTO-RTO: 0 %
PLATELET # BLD AUTO: 252 X10(3)/MCL (ref 130–400)
PMV BLD AUTO: 9.7 FL (ref 7.4–10.4)
POTASSIUM SERPL-SCNC: 4.1 MMOL/L (ref 3.5–5.1)
RBC # BLD AUTO: 4.19 X10(6)/MCL (ref 4.2–5.4)
SODIUM SERPL-SCNC: 143 MMOL/L (ref 136–145)
WBC # BLD AUTO: 4.03 X10(3)/MCL (ref 4.5–11.5)

## 2024-12-30 PROCEDURE — 99283 EMERGENCY DEPT VISIT LOW MDM: CPT

## 2024-12-30 PROCEDURE — 80048 BASIC METABOLIC PNL TOTAL CA: CPT | Performed by: FAMILY MEDICINE

## 2024-12-30 PROCEDURE — 85025 COMPLETE CBC W/AUTO DIFF WBC: CPT | Performed by: FAMILY MEDICINE

## 2024-12-30 RX ORDER — INSULIN PMP CART,AUT,G6/7,CNTR
EACH SUBCUTANEOUS
COMMUNITY
Start: 2024-12-26

## 2024-12-30 NOTE — ED PROVIDER NOTES
Encounter Date: 12/30/2024       History     Chief Complaint   Patient presents with    Hypoglycemia     Hypoglycemic episode last night, no episodes today. Doctors office told family to bring to ER.     Pt here with parents. BS got to 37 last pm, and pt had seizure like activity. Mom describes lethargic pt, but no LOC, and wanted to stare to the right. Gave plenty of sugar and symptoms never re-occurred. Doing well today, at baseline. Called PCP and referred to ER. Mom reports several recent issues with low blood sugar over the past few weeks. Pt has down's, but says she feels fine. She remember the episode yesterday.     The history is provided by the patient and a parent.     Review of patient's allergies indicates:   Allergen Reactions    Medrol [methylprednisolone]      Past Medical History:   Diagnosis Date    Diabetes mellitus     Down syndrome     Heart valve insufficiency     Hypothyroidism     Psoriasis      Past Surgical History:   Procedure Laterality Date    TUMOR REMOVAL       No family history on file.  Social History     Tobacco Use    Smoking status: Never    Smokeless tobacco: Never   Substance Use Topics    Alcohol use: Not Currently    Drug use: Not Currently     Review of Systems   Constitutional:  Negative for fever.   HENT:  Negative for sore throat.    Respiratory:  Negative for shortness of breath.    Cardiovascular:  Negative for chest pain.   Gastrointestinal:  Negative for nausea.   Genitourinary:  Negative for dysuria.   Musculoskeletal:  Negative for back pain.   Skin:  Negative for rash.   Neurological:  Negative for weakness.   Hematological:  Does not bruise/bleed easily.   All other systems reviewed and are negative.      Physical Exam     Initial Vitals [12/30/24 1537]   BP Pulse Resp Temp SpO2   138/85 78 20 97 °F (36.1 °C) 95 %      MAP       --         Physical Exam    Nursing note and vitals reviewed.  Constitutional: She appears well-developed and well-nourished.   Down's  faces noted.    Pt playing on tablet throughout exam, pleasant and interactive in NAD.   HENT:   Head: Normocephalic and atraumatic.   Eyes: EOM are normal. Pupils are equal, round, and reactive to light.   Neck: Neck supple.   Normal range of motion.  Cardiovascular:  Normal rate, regular rhythm and normal heart sounds.           Pulmonary/Chest: Breath sounds normal.   Abdominal: Abdomen is soft. Bowel sounds are normal. There is no abdominal tenderness.   Musculoskeletal:         General: No edema. Normal range of motion.      Cervical back: Normal range of motion and neck supple.     Neurological: She is alert and oriented to person, place, and time.   Skin: Skin is warm and dry. Capillary refill takes less than 2 seconds.   Psychiatric: She has a normal mood and affect.         ED Course   Procedures  Labs Reviewed   BASIC METABOLIC PANEL - Abnormal       Result Value    Sodium 143      Potassium 4.1      Chloride 108 (*)     CO2 26      Glucose 143 (*)     Blood Urea Nitrogen 13.0      Creatinine 0.89      BUN/Creatinine Ratio 15      Calcium 9.5      Anion Gap 9.0      eGFR >60     CBC WITH DIFFERENTIAL - Abnormal    WBC 4.03 (*)     RBC 4.19 (*)     Hgb 13.3      Hct 39.8      MCV 95.0 (*)     MCH 31.7 (*)     MCHC 33.4      RDW 15.0      Platelet 252      MPV 9.7      Neut % 57.5      Lymph % 29.3      Mono % 9.2      Eos % 2.5      Basophil % 1.5      Lymph # 1.18      Neut # 2.32      Mono # 0.37      Eos # 0.10      Baso # 0.06      IG# 0.00      IG% 0.0      NRBC% 0.0     CBC W/ AUTO DIFFERENTIAL    Narrative:     The following orders were created for panel order CBC Auto Differential.  Procedure                               Abnormality         Status                     ---------                               -----------         ------                     CBC with Differential[[id number]]       Abnormal            Final result                 Please view results for these tests on the individual  orders.          Imaging Results    None          Medications - No data to display  Medical Decision Making  Pt had seizure like activity, but no LOC, and is fine today. Hypoglycemia definitely capable of causing these symptoms.     D/w parents, advised close sugar monitoring, and discuss long term management with their PCP. Consider outpatient EEG if needed.    Amount and/or Complexity of Data Reviewed  Labs: ordered.     Details: CBC, CMP unremarkable.                                      Clinical Impression:  Final diagnoses:  [E16.2] Hypoglycemia (Primary)  [R56.9] Seizure-like activity          ED Disposition Condition    Discharge Stable          ED Prescriptions    None       Follow-up Information       Follow up With Specialties Details Why Contact Info    Ferny Whiting MD Family Medicine Schedule an appointment as soon as possible for a visit   [address] [facility] [address]  [phone]               Fredy Farris MD  12/30/24 [id number]

## 2024-12-30 NOTE — DISCHARGE INSTRUCTIONS
Consider EEG as outpatient. Keep close watch on blood sugar, discuss long term management with your PCP.

## 2024-12-30 NOTE — ED NOTES
"Patient ambulated to ER room 2 with steady gait.  Stated that last night pt's sugar was in the 30s and had an episode where she was unresponsive, but not unconscious.  Brought pt's sugar up with pop and called PCP today who encouraged them to come to the er for a "panel" to be drawn.  Pt's glucose monitor read 147 upon arrival.  Pt is back to base line and playing on her ipad.   "

## 2025-01-08 ENCOUNTER — TELEPHONE (OUTPATIENT)
Dept: FAMILY MEDICINE | Facility: CLINIC | Age: 25
End: 2025-01-08
Payer: MEDICAID

## 2025-01-08 DIAGNOSIS — E10.65 HYPERGLYCEMIA DUE TO TYPE 1 DIABETES MELLITUS: Primary | ICD-10-CM

## 2025-01-08 DIAGNOSIS — E03.9 HYPOTHYROIDISM, UNSPECIFIED TYPE: ICD-10-CM

## 2025-01-08 NOTE — TELEPHONE ENCOUNTER
----- Message from [place] sent at 1/8/2025  9:30 AM CST -----  Regarding: referral orders  Needs new referral internal to [facility]   Endocrinology-

## 2025-01-15 DIAGNOSIS — E10.9 TYPE 1 DIABETES MELLITUS WITHOUT COMPLICATIONS: ICD-10-CM

## 2025-01-15 RX ORDER — BLOOD-GLUCOSE METER
EACH MISCELLANEOUS
Qty: 200 STRIP | Refills: 3 | Status: SHIPPED | OUTPATIENT
Start: 2025-01-15

## 2025-01-27 ENCOUNTER — OFFICE VISIT (OUTPATIENT)
Dept: FAMILY MEDICINE | Facility: CLINIC | Age: 25
End: 2025-01-27
Payer: MEDICAID

## 2025-01-27 ENCOUNTER — APPOINTMENT (OUTPATIENT)
Dept: LAB | Facility: HOSPITAL | Age: 25
End: 2025-01-27
Attending: FAMILY MEDICINE
Payer: MEDICAID

## 2025-01-27 VITALS
TEMPERATURE: 97 F | HEIGHT: 58 IN | DIASTOLIC BLOOD PRESSURE: 68 MMHG | SYSTOLIC BLOOD PRESSURE: 94 MMHG | BODY MASS INDEX: 46.39 KG/M2 | RESPIRATION RATE: 18 BRPM | WEIGHT: 221 LBS | HEART RATE: 113 BPM | OXYGEN SATURATION: 100 %

## 2025-01-27 DIAGNOSIS — J06.9 ACUTE URI: ICD-10-CM

## 2025-01-27 DIAGNOSIS — R30.0 DYSURIA: Primary | ICD-10-CM

## 2025-01-27 LAB
BACTERIA #/AREA URNS AUTO: ABNORMAL /HPF
BILIRUB SERPL-MCNC: ABNORMAL MG/DL
BILIRUB UR QL STRIP.AUTO: NEGATIVE
BLOOD URINE, POC: ABNORMAL
CLARITY UR: CLEAR
CLARITY, POC UA: ABNORMAL
COLOR UR AUTO: YELLOW
COLOR, POC UA: YELLOW
GLUCOSE UR QL STRIP: ABNORMAL
GLUCOSE UR QL STRIP: NEGATIVE
HGB UR QL STRIP: ABNORMAL
KETONES UR QL STRIP: ABNORMAL
KETONES UR QL STRIP: NEGATIVE
LEUKOCYTE ESTERASE UR QL STRIP: NEGATIVE
LEUKOCYTE ESTERASE URINE, POC: ABNORMAL
NITRITE UR QL STRIP: NEGATIVE
NITRITE, POC UA: ABNORMAL
PH UR STRIP: 8 [PH]
PH, POC UA: 8
PROT UR QL STRIP: NEGATIVE
PROTEIN, POC: ABNORMAL
RBC #/AREA URNS AUTO: ABNORMAL /HPF
SP GR UR STRIP.AUTO: 1.01 (ref 1–1.03)
SPECIFIC GRAVITY, POC UA: <1.005
SQUAMOUS #/AREA URNS AUTO: ABNORMAL /HPF
UROBILINOGEN UR STRIP-ACNC: 0.2
UROBILINOGEN, POC UA: ABNORMAL
WBC #/AREA URNS AUTO: ABNORMAL /HPF

## 2025-01-27 PROCEDURE — 81002 URINALYSIS NONAUTO W/O SCOPE: CPT | Mod: ,,, | Performed by: FAMILY MEDICINE

## 2025-01-27 PROCEDURE — 99214 OFFICE O/P EST MOD 30 MIN: CPT | Mod: ,,, | Performed by: FAMILY MEDICINE

## 2025-01-27 PROCEDURE — 1159F MED LIST DOCD IN RCRD: CPT | Mod: CPTII,,, | Performed by: FAMILY MEDICINE

## 2025-01-27 PROCEDURE — 3078F DIAST BP <80 MM HG: CPT | Mod: CPTII,,, | Performed by: FAMILY MEDICINE

## 2025-01-27 PROCEDURE — 1160F RVW MEDS BY RX/DR IN RCRD: CPT | Mod: CPTII,,, | Performed by: FAMILY MEDICINE

## 2025-01-27 PROCEDURE — 3074F SYST BP LT 130 MM HG: CPT | Mod: CPTII,,, | Performed by: FAMILY MEDICINE

## 2025-01-27 PROCEDURE — 3008F BODY MASS INDEX DOCD: CPT | Mod: CPTII,,, | Performed by: FAMILY MEDICINE

## 2025-01-27 RX ORDER — DEXBROMPHENIRAMINE MALEATE 2 MG/1
1 TABLET ORAL EVERY 4 HOURS PRN
Qty: 30 TABLET | Refills: 0 | Status: SHIPPED | OUTPATIENT
Start: 2025-01-27 | End: 2025-02-06

## 2025-01-27 RX ORDER — LEVOTHYROXINE SODIUM 112 UG/1
112 TABLET ORAL EVERY MORNING
COMMUNITY
Start: 2024-12-27

## 2025-01-27 RX ORDER — PHENAZOPYRIDINE HYDROCHLORIDE 200 MG/1
200 TABLET, FILM COATED ORAL 3 TIMES DAILY PRN
Qty: 9 TABLET | Refills: 0 | Status: SHIPPED | OUTPATIENT
Start: 2025-01-27 | End: 2025-02-06

## 2025-01-27 NOTE — PROGRESS NOTES
"Subjective:      Patient ID: Arlin Daniels is a 24 y.o. female.    Chief Complaint: Hematuria      Hematuria    Hematuria  Irritative symptoms include frequency. Associated symptoms include chills, dysuria, fever and nausea.       Review of Systems   Constitutional:  Positive for chills and fever.   HENT:  Positive for nasal congestion, ear pain and rhinorrhea.    Respiratory:  Positive for cough.    Cardiovascular: Negative.    Gastrointestinal:  Positive for nausea.   Genitourinary:  Positive for dysuria, frequency and hematuria.   Musculoskeletal:  Positive for back pain.         Objective:     BP 94/68   Pulse (!) 113   Temp 97.1 °F (36.2 °C) (Temporal)   Resp 18   Ht 4' 9.87" (1.47 m)   Wt 100.2 kg (221 lb)   SpO2 100%   BMI 46.39 kg/m²    Physical Exam  Constitutional:       Appearance: Normal appearance.   HENT:      Right Ear: Tympanic membrane and ear canal normal.      Left Ear: Tympanic membrane and ear canal normal.      Mouth/Throat:      Mouth: Mucous membranes are moist.      Pharynx: Oropharynx is clear.   Cardiovascular:      Rate and Rhythm: Normal rate and regular rhythm.      Heart sounds: Normal heart sounds.   Pulmonary:      Effort: Pulmonary effort is normal.      Breath sounds: Normal breath sounds.   Neurological:      Mental Status: She is alert.   Psychiatric:         Mood and Affect: Mood normal.         Behavior: Behavior normal.         Thought Content: Thought content normal.         Judgment: Judgment normal.       Recent Results (from the past 3 weeks)   POCT URINE DIPSTICK WITHOUT MICROSCOPE    Collection Time: 01/27/25  3:05 PM   Result Value Ref Range    Glucose, UA -     Bilirubin, POC -     Ketones, UA -     Spec Grav UA <1.005 (A)     Blood, UA 50++ (A)     pH, UA 8.0     Protein, POC -     Urobilinogen, UA -     Nitrite, UA -     WBC, UA -     Color, UA Yellow     Clarity, UA Cloudy             Assessment:     Problem List Items Addressed This Visit    None  Visit " Diagnoses       Dysuria    -  Primary    Relevant Medications    phenazopyridine (PYRIDIUM) 200 MG tablet    Other Relevant Orders    POCT URINE DIPSTICK WITHOUT MICROSCOPE (Completed)    Urinalysis, Reflex to Urine Culture    Acute URI        Relevant Medications    dexbrompheniramine maleate (ALA-HIST IR) 2 mg Tab             Plan:   1. Dysuria  -     POCT URINE DIPSTICK WITHOUT MICROSCOPE  -     phenazopyridine (PYRIDIUM) 200 MG tablet; Take 1 tablet (200 mg total) by mouth 3 (three) times daily as needed for Pain.  Dispense: 9 tablet; Refill: 0  -     Urinalysis, Reflex to Urine Culture  Rx for Pyridium 200 mg t.i.d. x3 days   Send urine for culture   Increase fluids   Monitor   Return to clinic with any concerns     2. Acute URI  -     dexbrompheniramine maleate (ALA-HIST IR) 2 mg Tab; Take 1 tablet by mouth every 4 (four) hours as needed (congestion).  Dispense: 30 tablet; Refill: 0  Rx for above medication  OTC medications as needed  Monitor  Return to clinic with concerns

## 2025-01-28 ENCOUNTER — TELEPHONE (OUTPATIENT)
Dept: FAMILY MEDICINE | Facility: CLINIC | Age: 25
End: 2025-01-28
Payer: MEDICAID

## 2025-01-28 RX ORDER — NIRMATRELVIR AND RITONAVIR 300-100 MG
KIT ORAL
Qty: 30 TABLET | Refills: 0 | Status: SHIPPED | OUTPATIENT
Start: 2025-01-28 | End: 2025-02-02

## 2025-01-28 NOTE — TELEPHONE ENCOUNTER
Ferny Whiting MD Hebert, Bailey, MANDEEP  Caller: Unspecified (Today,  1:41 PM)  Rx for Paxlovid sent to pharmacy

## 2025-01-28 NOTE — TELEPHONE ENCOUNTER
----- Message from [place] sent at 1/28/2025  1:01 PM CST -----  Regarding: covid +/ Katie Zaragoza called [phone]    She tested Arlin for Covid home test and its +    She's asking what to do

## 2025-02-05 DIAGNOSIS — E10.9 TYPE 1 DIABETES MELLITUS WITHOUT COMPLICATIONS: ICD-10-CM

## 2025-02-05 DIAGNOSIS — L02.91 ABSCESS: ICD-10-CM

## 2025-02-05 RX ORDER — MUPIROCIN 20 MG/G
OINTMENT TOPICAL 3 TIMES DAILY
Qty: 22 G | Refills: 0 | Status: SHIPPED | OUTPATIENT
Start: 2025-02-05

## 2025-03-31 ENCOUNTER — OFFICE VISIT (OUTPATIENT)
Dept: ENDOCRINOLOGY | Facility: CLINIC | Age: 25
End: 2025-03-31
Payer: MEDICAID

## 2025-03-31 VITALS
DIASTOLIC BLOOD PRESSURE: 83 MMHG | SYSTOLIC BLOOD PRESSURE: 114 MMHG | HEART RATE: 96 BPM | WEIGHT: 229.19 LBS | BODY MASS INDEX: 49.45 KG/M2 | TEMPERATURE: 98 F | HEIGHT: 57 IN | RESPIRATION RATE: 12 BRPM

## 2025-03-31 DIAGNOSIS — E03.9 HYPOTHYROIDISM, UNSPECIFIED TYPE: ICD-10-CM

## 2025-03-31 DIAGNOSIS — E10.65 HYPERGLYCEMIA DUE TO TYPE 1 DIABETES MELLITUS: Primary | ICD-10-CM

## 2025-03-31 LAB — HBA1C MFR BLD: 7.6 %

## 2025-03-31 PROCEDURE — 83036 HEMOGLOBIN GLYCOSYLATED A1C: CPT | Mod: PBBFAC | Performed by: NURSE PRACTITIONER

## 2025-03-31 PROCEDURE — 99215 OFFICE O/P EST HI 40 MIN: CPT | Mod: PBBFAC | Performed by: NURSE PRACTITIONER

## 2025-03-31 RX ORDER — INSULIN PMP CART,AUT,G6/7,CNTR
EACH SUBCUTANEOUS
Qty: 15 EACH | Refills: 11 | Status: SHIPPED | OUTPATIENT
Start: 2025-03-31

## 2025-03-31 RX ORDER — BLOOD-GLUCOSE TRANSMITTER
EACH MISCELLANEOUS
Qty: 1 EACH | Refills: 3 | Status: SHIPPED | OUTPATIENT
Start: 2025-03-31

## 2025-03-31 RX ORDER — BLOOD-GLUCOSE SENSOR
EACH MISCELLANEOUS
Qty: 3 EACH | Refills: 11 | Status: SHIPPED | OUTPATIENT
Start: 2025-03-31

## 2025-03-31 NOTE — PROGRESS NOTES
Patient Name: Arlin Daniels   : 2000  MRN: [id number]     SUBJECTIVE DATA:    CHIEF COMPLAINT:   Arlin Daniels is a 25 y.o. female who presents to clinic today with type 1 diabetes        HPI 25-year-old female presents to endocrine clinic accompanied by her parents to follow-up on diabetes type 1 and hypothyroidism.  Past medical history of Down syndrome, hypothyroidism, diabetes type 1.    Diabetes type 1: Current  POC hemoglobin A1c 2025 7.6%.  Patient currently on Omnipod 5 with G6 with the insulin lispro 100 unit/mL.  Omni pot readings discussed at bedside with patient and her parents.    Dexcom interpretation 2025 - 2025:  Days with CGM data 14/14 days.  GMI N/A.  Time in range 64 % ,very high13%,  high 22% range, 1% low, less than 0% very low.    On patient's best day average glucose 175 with 77.4% in range.  Insulin data is lacking limiting interpretation at times.  Patient has prandial spikes at time indicating she is not getting enough prandial insulin.  Patient occasionally has prandial spikes to 400 indicating she is eating more food than normal and that is episodic.  One episode patient has hypoglycemia because she entered carbohydrate too late.     Parents are helping entering carbohydrate intake.  Parents report at times omni pod malfunction or leaks and they have to replace sometimes frequently.  At times they ran out of Omni pods too early because of frequent change of device and at time the patient complain of discomfort at site.  Parents will utilize daily injections with Lantus 10 units at bedtime and lispro carb counting 1 unit for every 15 g of carbohydrates if she is out of Omnipod only.    Foot exam was completed with her parents assistant.  Retinal eye exam not completed will wait for patient to complete ophthalmology appointment in the next few months and request results.    Plan of care:  - Labs:  C-peptide, renal function panel , bettie 65.  - Omni pod order  "has been changed to maximum of 80 units per day and change every 2 days and hopefully this prevent patient from not getting her pods on time.  - When out of Omnipod Continue to utilize Lantus 10 units at bedtime may change to morning time if worrying about hypoglycemic episods at bedtime and also to continue with lispro 1 unit for every 15 g of carbohydrate consumed .  - complete labs before your next visit.  - call the clinic if you have any questions or concerns, return to clinic sooner if needed.  - diabetic foot exam completed.  - will request ophthalmology report on her next visit to update diabetic eye exam.  - questions solicited and answered, parents verbalized understanding.      Hypothyroidism:   - TSH slightly decreased 11/04/2024: TSH 0.347 , T3 89   - Patient currently compliant with levothyroxine 112 mcg per day.  She has not taking on empty stomach.  - Advise patient/parents to start taking medication on empty stomach 30 minutes before 1st meal.  - Will adjust medications depending on lab.  - Labs:  TPO,  thyroglobulin ab TSH, T4, T3.  -return to clinic in 4 months or sooner if needed.  -questions solicited monitor, patient is parents verbalized understanding and agreed to plan of care.      Patient/parents: denies chest pain, shortness of breath, dyspnea on exertion, palpitations, peripheral edema, abdominal pain, nausea, vomiting, diarrhea, constipation, fatigue, fever, chills, dysuria,  hematuria.            ALLERGIES:   Review of patient's allergies indicates:   Allergen Reactions    Medrol [methylprednisolone]          ROS:  Review of Systems   All other systems reviewed and are negative.        OBJECTIVE DATA:  Vital signs  Vitals:    03/31/25 0935   BP: 114/83   BP Location: Left arm   Patient Position: Sitting   Pulse: 96   Resp: 12   Temp: 98.1 °F (36.7 °C)   TempSrc: Oral   Weight: 103.9 kg (229 lb 2.7 oz)   Height: 4' 9" (1.448 m)      Body mass index is 49.59 kg/m².    PHYSICAL EXAM: "   Physical Exam  Vitals and nursing note reviewed.   Constitutional:       General: She is awake. She is not in acute distress.     Appearance: Normal appearance. She is well-developed and well-groomed. She is not ill-appearing, toxic-appearing or diaphoretic.   HENT:      Head: Normocephalic and atraumatic.      Right Ear: External ear normal.      Left Ear: External ear normal.      Nose: Nose normal.      Mouth/Throat:      Mouth: Mucous membranes are moist.   Eyes:      General: Lids are normal.      Extraocular Movements: Extraocular movements intact.      Conjunctiva/sclera: Conjunctivae normal.      Pupils: Pupils are equal, round, and reactive to light.   Cardiovascular:      Rate and Rhythm: Normal rate and regular rhythm.      Pulses: Normal pulses.           Radial pulses are 2+ on the right side and 2+ on the left side.        Dorsalis pedis pulses are 2+ on the right side and 2+ on the left side.      Heart sounds: Normal heart sounds. No murmur heard.  Pulmonary:      Effort: Pulmonary effort is normal.      Breath sounds: Normal breath sounds and air entry.   Abdominal:      General: Abdomen is flat.   Musculoskeletal:         General: Normal range of motion.      Cervical back: Normal range of motion and neck supple.        Feet:    Feet:      Right foot:      Protective Sensation: 10 sites tested.  10 sites sensed.      Skin integrity: Skin integrity normal.      Toenail Condition: Right toenails are normal.      Left foot:      Protective Sensation: 10 sites tested.  8 sites sensed.      Skin integrity: Skin integrity normal.      Toenail Condition: Left toenails are normal.   Skin:     General: Skin is warm and dry.      Capillary Refill: Capillary refill takes less than 2 seconds.   Neurological:      General: No focal deficit present.      Mental Status: She is alert and oriented to person, place, and time. Mental status is at baseline.      GCS: GCS eye subscore is 4. GCS verbal subscore is 5.  GCS motor subscore is 6.      Cranial Nerves: No cranial nerve deficit.      Sensory: No sensory deficit.      Motor: No weakness.      Coordination: Coordination normal.      Gait: Gait normal.      Comments: Down syndrome, at baseline.   Psychiatric:         Attention and Perception: Attention and perception normal.         Mood and Affect: Mood normal.         Speech: Speech normal.         Behavior: Behavior is uncooperative.         Thought Content: Thought content normal.         Judgment: Judgment normal.          ASSESSMENT/PLAN:  1. Hyperglycemia due to type 1 diabetes mellitus  Assessment & Plan:  Plan of care:  - Labs:  C-peptide, renal function panel , bettie 65.  - Omni pod order has been changed to maximum of 80 units per day and change every 2 days and hopefully this prevent patient from not getting her pods on time.  - When out of Omnipod Continue to utilize Lantus 10 units at bedtime may change to morning time if worrying about hypoglycemic episode at bedtime and also to continue with lispro 1 unit for every 15 g of carbohydrate consumed .  - complete labs before your next visit.  - call the clinic if you have any questions or concerns, return to clinic sooner if needed.  - diabetic foot exam completed.  - will request ophthalmology report on her next visit to update diabetic eye exam.  - questions solicited and answered, parents verbalized understanding.    Orders:  -     Ambulatory referral/consult to Endocrinology  -     Hemoglobin A1C, POCT  -     Foot Exam Performed  -     Cancel: Diabetic Eye Screening Photo  -     Renal Function Panel; Future; Expected date: 03/31/2025  -     C-Peptide; Future; Expected date: 03/31/2025  -     blood-glucose sensor (DEXCOM G6 SENSOR) Callie; Use every 10 days as directed.  Dispense: 3 each; Refill: 11  -     blood-glucose transmitter (DEXCOM G6 TRANSMITTER) Callie; Every 90 days as directed.  Dispense: 1 each; Refill: 3  -     OMNIPOD 5 G6-G7 PODS, GEN 5, Crtg;  Change every 2 days as directed. Max dose 80 units per day.  Dispense: 15 each; Refill: 11  -     Glutamic Acid Decarboxylase; Future; Expected date: 03/31/2025    2. Hypothyroidism, unspecified type  Assessment & Plan:  Hypothyroidism:   - TSH slightly decreased 11/04/2024: TSH 0.347 , T3 89   - Patient currently compliant with levothyroxine 112 mcg per day.  She has not taking on empty stomach.  - Advise patient/parents to start taking medication on empty stomach 30 minutes before 1st meal.  - Will adjust medications depending on lab.  - Labs:  TPO,  thyroglobulin ab TSH, T4, T3.  -return to clinic in 4 months or sooner if needed.  -questions solicited monitor, patient is parents verbalized understanding and agreed to plan of care.    Orders:  -     Ambulatory referral/consult to Endocrinology  -     TSH; Future; Expected date: 03/31/2025  -     T4, Free; Future; Expected date: 03/31/2025  -     T3, Free (OLG); Future; Expected date: 03/31/2025  -     THYROID PEROXIDASE (TPO); Future; Expected date: 03/31/2025  -     Anti-Thyroglobulin Antibody; Future; Expected date: 03/31/2025           RESULTS:  Recent Results (from the past 6 weeks)   Hemoglobin A1C, POCT    Collection Time: 03/31/25 10:21 AM   Result Value Ref Range    Hemoglobin A1C, POC 7.6 %         Follow Up:  Follow up in about 4 months (around 7/31/2025).      Previous medical history/lab work/radiology reviewed and considered during medical management decisions.   Medication list reviewed and medication reconciliation performed.  Patient was provided  and care about his/her current diagnosis (es) and medications including risk/benefit and side effects/adverse events, over the counter medication uses/doses, home self-care and contact precautions,  and red flags and indications for when to seek immediate medical attention.   Patient was advised to continue compliance with current medication list and medical recommendations.  Patient dvised  continued compliance with recommended eating habits/ diets for medical conditions and exercise 150 minutes/ week (if possible) for medical condition (s).  Educational handouts and instructions on selected disease management in AVS (After Visit Summary).    All of the patient's questions were answered to patient's satisfaction.   The patient was receptive, expressed verbal understanding and agreement the above plan.        This note was created with the assistance of a voice recognition software or phone dictation. There may be transcription errors as a result of using this technology however minimal. Effort has been made to assure accuracy of transcription but any obvious errors or omissions should be clarified with the author of the document

## 2025-03-31 NOTE — ASSESSMENT & PLAN NOTE
Hypothyroidism:   - TSH slightly decreased 11/04/2024: TSH 0.347 , T3 89   - Patient currently compliant with levothyroxine 112 mcg per day.  She has not taking on empty stomach.  - Advise patient/parents to start taking medication on empty stomach 30 minutes before 1st meal.  - Will adjust medications depending on lab.  - Labs:  TPO,  thyroglobulin ab TSH, T4, T3.  -return to clinic in 4 months or sooner if needed.  -questions solicited monitor, patient is parents verbalized understanding and agreed to plan of care.

## 2025-03-31 NOTE — ASSESSMENT & PLAN NOTE
Plan of care:  - Labs:  C-peptide, renal function panel , bettie 65.  - Omni pod order has been changed to maximum of 80 units per day and change every 2 days and hopefully this prevent patient from not getting her pods on time.  - When out of Omnipod Continue to utilize Lantus 10 units at bedtime may change to morning time if worrying about hypoglycemic episode at bedtime and also to continue with lispro 1 unit for every 15 g of carbohydrate consumed .  - complete labs before your next visit.  - call the clinic if you have any questions or concerns, return to clinic sooner if needed.  - diabetic foot exam completed.  - will request ophthalmology report on her next visit to update diabetic eye exam.  - questions solicited and answered, parents verbalized understanding.

## 2025-04-09 DIAGNOSIS — T78.40XD ALLERGY, SUBSEQUENT ENCOUNTER: ICD-10-CM

## 2025-04-09 DIAGNOSIS — L02.91 ABSCESS: ICD-10-CM

## 2025-04-09 RX ORDER — FLUTICASONE PROPIONATE 50 MCG
2 SPRAY, SUSPENSION (ML) NASAL DAILY
Qty: 16 G | Refills: 3 | Status: SHIPPED | OUTPATIENT
Start: 2025-04-09

## 2025-04-09 RX ORDER — MUPIROCIN 20 MG/G
OINTMENT TOPICAL 3 TIMES DAILY
Qty: 22 G | Refills: 0 | Status: SHIPPED | OUTPATIENT
Start: 2025-04-09

## 2025-04-15 ENCOUNTER — LAB VISIT (OUTPATIENT)
Dept: LAB | Facility: HOSPITAL | Age: 25
End: 2025-04-15
Attending: NURSE PRACTITIONER
Payer: MEDICAID

## 2025-04-15 DIAGNOSIS — E10.65 HYPERGLYCEMIA DUE TO TYPE 1 DIABETES MELLITUS: ICD-10-CM

## 2025-04-15 DIAGNOSIS — E03.9 HYPOTHYROIDISM, UNSPECIFIED TYPE: ICD-10-CM

## 2025-04-15 LAB
ALBUMIN SERPL-MCNC: 3.4 G/DL (ref 3.5–5)
BUN SERPL-MCNC: 12.9 MG/DL (ref 7–18.7)
CALCIUM SERPL-MCNC: 9.3 MG/DL (ref 8.4–10.2)
CHLORIDE SERPL-SCNC: 103 MMOL/L (ref 98–107)
CO2 SERPL-SCNC: 27 MMOL/L (ref 22–29)
CREAT SERPL-MCNC: 0.96 MG/DL (ref 0.55–1.02)
GFR SERPLBLD CREATININE-BSD FMLA CKD-EPI: >60 ML/MIN/1.73/M2
GLUCOSE SERPL-MCNC: 156 MG/DL (ref 74–100)
PHOSPHATE SERPL-MCNC: 4.4 MG/DL (ref 2.3–4.7)
POTASSIUM SERPL-SCNC: 4 MMOL/L (ref 3.5–5.1)
SODIUM SERPL-SCNC: 137 MMOL/L (ref 136–145)
T3FREE SERPL-MCNC: 3.1 PG/ML (ref 1.58–3.91)
T4 FREE SERPL-MCNC: 0.93 NG/DL (ref 0.7–1.48)
TSH SERPL-ACNC: 1.38 UIU/ML (ref 0.35–4.94)

## 2025-04-15 PROCEDURE — 86800 THYROGLOBULIN ANTIBODY: CPT

## 2025-04-15 PROCEDURE — 86341 ISLET CELL ANTIBODY: CPT

## 2025-04-15 PROCEDURE — 84439 ASSAY OF FREE THYROXINE: CPT

## 2025-04-15 PROCEDURE — 84481 FREE ASSAY (FT-3): CPT

## 2025-04-15 PROCEDURE — 80069 RENAL FUNCTION PANEL: CPT

## 2025-04-15 PROCEDURE — 84681 ASSAY OF C-PEPTIDE: CPT

## 2025-04-15 PROCEDURE — 86376 MICROSOMAL ANTIBODY EACH: CPT

## 2025-04-15 PROCEDURE — 36415 COLL VENOUS BLD VENIPUNCTURE: CPT

## 2025-04-15 PROCEDURE — 84443 ASSAY THYROID STIM HORMONE: CPT

## 2025-04-16 ENCOUNTER — RESULTS FOLLOW-UP (OUTPATIENT)
Dept: ENDOCRINOLOGY | Facility: CLINIC | Age: 25
End: 2025-04-16

## 2025-04-16 LAB
C PEPTIDE P FAST SERPL-MCNC: 0.2 NG/ML (ref 1.1–4.4)
THYROGLOB AB SERPL IA-ACNC: 70 IU/ML
THYROID PEROXIDASE QUANT (OLG): 653 IU/ML

## 2025-04-16 NOTE — PROGRESS NOTES
TSH 1.37, free T4 0.93, free T3 3.10 within normal range continue levothyroxine 112 mcg p.o. on empty stomach 30 minutes before 1st meal.  Request refills as needed    Kidneys liver functions within acceptable range.    The rest of labs pending will notify of test results when they become available.

## 2025-04-18 LAB — GAD65 AB SER-SCNC: 0.47 NMOL/L

## 2025-04-21 NOTE — PROGRESS NOTES
Please call patient's parents and notify them of test results,   Lab results confirm she is type 1 diabetic.  In regards to thyroid they are within normal range.  Patient to continue same dose levothyroxine.  Kidneys liver functions within acceptable range.

## 2025-05-08 ENCOUNTER — OFFICE VISIT (OUTPATIENT)
Dept: FAMILY MEDICINE | Facility: CLINIC | Age: 25
End: 2025-05-08
Payer: MEDICAID

## 2025-05-08 DIAGNOSIS — N61.0 MASTITIS: Primary | ICD-10-CM

## 2025-05-08 DIAGNOSIS — N61.0 MASTITIS: ICD-10-CM

## 2025-05-08 PROCEDURE — G2211 COMPLEX E/M VISIT ADD ON: HCPCS | Mod: 95,,, | Performed by: FAMILY MEDICINE

## 2025-05-08 PROCEDURE — 3051F HG A1C>EQUAL 7.0%<8.0%: CPT | Mod: CPTII,95,, | Performed by: FAMILY MEDICINE

## 2025-05-08 PROCEDURE — 1160F RVW MEDS BY RX/DR IN RCRD: CPT | Mod: CPTII,95,, | Performed by: FAMILY MEDICINE

## 2025-05-08 PROCEDURE — 1159F MED LIST DOCD IN RCRD: CPT | Mod: CPTII,95,, | Performed by: FAMILY MEDICINE

## 2025-05-08 PROCEDURE — 98006 SYNCH AUDIO-VIDEO EST MOD 30: CPT | Mod: 95,,, | Performed by: FAMILY MEDICINE

## 2025-05-08 RX ORDER — DICLOXACILLIN SODIUM 500 MG/1
500 CAPSULE ORAL EVERY 6 HOURS
Qty: 40 CAPSULE | Refills: 0 | Status: SHIPPED | OUTPATIENT
Start: 2025-05-08 | End: 2025-05-08

## 2025-05-08 RX ORDER — DICLOXACILLIN SODIUM 500 MG/1
500 CAPSULE ORAL EVERY 6 HOURS
Qty: 40 CAPSULE | Refills: 0 | Status: SHIPPED | OUTPATIENT
Start: 2025-05-08 | End: 2025-05-08 | Stop reason: SDUPTHER

## 2025-05-08 RX ORDER — AMOXICILLIN AND CLAVULANATE POTASSIUM 875; 125 MG/1; MG/1
1 TABLET, FILM COATED ORAL EVERY 12 HOURS
Qty: 14 TABLET | Refills: 0 | Status: SHIPPED | OUTPATIENT
Start: 2025-05-08 | End: 2025-05-15

## 2025-05-08 NOTE — PROGRESS NOTES
History of Present Illness    CHIEF COMPLAINT:  Patient presents today for evaluation of a sore under her breast    HISTORY OF PRESENT ILLNESS:  She reports a sore under her breast with a visible hole and drainage. She experiences intermittent pain, including sharp, shooting pains in the affected area. She denies fever or chills. She is currently using Bactrim cream for treatment.    ALLERGIES:  She reports an allergy to Medrol but denies any antibiotic allergies.      ROS:  General: no fever, no chills  Breasts: - nipple discharge, +breast pain, +shooting pain sensation               General:  Alert oriented, no acute distress  Neurologic:  Alert, oriented  Skin: abscess noted on bilateral breasts, draining  Psychiatric:  Cooperative, appropriate mood and affect, normal judgment      The patient location is: [place]  The chief complaint leading to consultation is: mastitis    Visit type: audiovisual    Face to Face time with patient: 15 minutes of total time spent on the encounter, which includes face to face time and non-face to face time preparing to see the patient (eg, review of tests), Obtaining and/or reviewing separately obtained history, Documenting clinical information in the electronic or other health record, Independently interpreting results (not separately reported) and communicating results to the patient/family/caregiver, or Care coordination (not separately reported).         Each patient to whom he or she provides medical services by telemedicine is:  (1) informed of the relationship between the physician and patient and the respective role of any other health care provider with respect to management of the patient; and (2) notified that he or she may decline to receive medical services by telemedicine and may withdraw from such care at any time.    Assessment:     Problem List Items Addressed This Visit    None  Visit Diagnoses         Mastitis    -  Primary    Relevant Medications     dicloxacillin (DYNAPEN) 500 MG capsule             Plan:   1. Mastitis  -     dicloxacillin (DYNAPEN) 500 MG capsule; Take 1 capsule (500 mg total) by mouth every 6 (six) hours. for 10 days  Dispense: 40 capsule; Refill: 0  Rx for above medication  Wound care discussed  Monitor  Return to clinic with concerns          This note was generated with the assistance of ambient listening technology. Verbal consent was obtained by the patient and accompanying visitor(s) for the recording of patient appointment to facilitate this note. I attest to having reviewed and edited the generated note for accuracy, though some syntax or spelling errors may persist. Please contact the author of this note for any clarification.

## 2025-05-21 ENCOUNTER — OFFICE VISIT (OUTPATIENT)
Dept: FAMILY MEDICINE | Facility: CLINIC | Age: 25
End: 2025-05-21
Payer: MEDICAID

## 2025-05-21 VITALS
SYSTOLIC BLOOD PRESSURE: 116 MMHG | HEART RATE: 80 BPM | BODY MASS INDEX: 49.76 KG/M2 | WEIGHT: 230.63 LBS | OXYGEN SATURATION: 97 % | TEMPERATURE: 98 F | HEIGHT: 57 IN | RESPIRATION RATE: 18 BRPM | DIASTOLIC BLOOD PRESSURE: 80 MMHG

## 2025-05-21 DIAGNOSIS — E11.9 TYPE 2 DIABETES MELLITUS WITHOUT COMPLICATION, WITHOUT LONG-TERM CURRENT USE OF INSULIN: ICD-10-CM

## 2025-05-21 DIAGNOSIS — J06.9 ACUTE URI: Primary | ICD-10-CM

## 2025-05-21 RX ORDER — DEXBROMPHENIRAMINE MALEATE 2 MG/1
1 TABLET ORAL EVERY 4 HOURS PRN
Qty: 30 TABLET | Refills: 0 | Status: SHIPPED | OUTPATIENT
Start: 2025-05-21 | End: 2025-05-31

## 2025-05-21 RX ORDER — FLUTICASONE PROPIONATE 50 MCG
2 SPRAY, SUSPENSION (ML) NASAL DAILY
Qty: 16 G | Refills: 3 | Status: SHIPPED | OUTPATIENT
Start: 2025-05-21

## 2025-05-21 RX ORDER — TIRZEPATIDE 2.5 MG/.5ML
2.5 INJECTION, SOLUTION SUBCUTANEOUS
Qty: 2 ML | Refills: 3 | Status: SHIPPED | OUTPATIENT
Start: 2025-05-21 | End: 2025-05-22 | Stop reason: ALTCHOICE

## 2025-05-21 NOTE — PROGRESS NOTES
"Subjective:     Patient ID: Arlin Daniels is a 25 y.o. female.    Chief Complaint: sinus congestion (C/O congestion x1-2 days), Cough (C/O cough starting last night), and Otalgia (C/O pain and fluid/drainage in both ears)        History of Present Illness    CHIEF COMPLAINT:  Patient presents today for not feeling well with respiratory symptoms.    RESPIRATORY SYMPTOMS:  She presents with new onset cough and difficulty breathing. She denies fever or chills.    BLOOD SUGAR:  Blood glucose level was 120.    ALLERGIES:  She has a history of reaction to Prednisone manifesting as a bad taste in mouth.      ROS:  General: no fever, no chills  ENT: +ear pain, +nasal congestion, +blockage or obstruction  Respiratory: +cough, +difficulty breathing            Review of Systems    Objective:     /80 (BP Location: Left arm, Patient Position: Sitting)   Pulse 80   Temp 97.5 °F (36.4 °C)   Resp 18   Ht 4' 9" (1.448 m)   Wt 104.6 kg (230 lb 9.6 oz)   LMP 05/21/2025 (Exact Date)   SpO2 97%   BMI 49.90 kg/m²    Physical Exam  Constitutional:       Appearance: Normal appearance.   HENT:      Right Ear: Tympanic membrane and ear canal normal.      Left Ear: Tympanic membrane and ear canal normal.      Mouth/Throat:      Mouth: Mucous membranes are moist.      Pharynx: Oropharynx is clear.   Cardiovascular:      Rate and Rhythm: Normal rate and regular rhythm.      Heart sounds: Normal heart sounds.   Pulmonary:      Effort: Pulmonary effort is normal.      Breath sounds: Normal breath sounds.   Neurological:      Mental Status: She is alert.   Psychiatric:         Mood and Affect: Mood normal.         Behavior: Behavior normal.         Thought Content: Thought content normal.         Judgment: Judgment normal.             Assessment:     Problem List Items Addressed This Visit          Endocrine    Diabetes mellitus    Relevant Medications    tirzepatide (MOUNJARO) 2.5 mg/0.5 mL PnIj     Other Visit Diagnoses         " Acute URI    -  Primary    Relevant Medications    dexbrompheniramine maleate (ALA-HIST IR) 2 mg Tab    fluticasone propionate (FLONASE) 50 mcg/actuation nasal spray             Plan:   1. Acute URI  -     dexbrompheniramine maleate (ALA-HIST IR) 2 mg Tab; Take 1 tablet by mouth every 4 (four) hours as needed (congestion).  Dispense: 30 tablet; Refill: 0  -     fluticasone propionate (FLONASE) 50 mcg/actuation nasal spray; 2 sprays (100 mcg total) by Each Nostril route once daily.  Dispense: 16 g; Refill: 3  Rx for above medication  OTC medications as needed  Monitor  Return to clinic with concerns     2. Type 2 diabetes mellitus without complication, without long-term current use of insulin  -     tirzepatide (MOUNJARO) 2.5 mg/0.5 mL PnIj; Inject 2.5 mg into the skin every 7 days.  Dispense: 2 mL; Refill: 3  Add Mounjaro 2.5 mg q.week for better CBG/weight control  Continue diet modification   Monitor CBGS                    This note was generated with the assistance of ambient listening technology. Verbal consent was obtained by the patient and accompanying visitor(s) for the recording of patient appointment to facilitate this note. I attest to having reviewed and edited the generated note for accuracy, though some syntax or spelling errors may persist. Please contact the author of this note for any clarification.

## 2025-05-22 ENCOUNTER — TELEPHONE (OUTPATIENT)
Dept: FAMILY MEDICINE | Facility: CLINIC | Age: 25
End: 2025-05-22
Payer: MEDICAID

## 2025-05-22 DIAGNOSIS — E11.9 TYPE 2 DIABETES MELLITUS WITHOUT COMPLICATION, WITHOUT LONG-TERM CURRENT USE OF INSULIN: Primary | ICD-10-CM

## 2025-05-22 RX ORDER — SEMAGLUTIDE 0.68 MG/ML
0.25 INJECTION, SOLUTION SUBCUTANEOUS
Qty: 1.5 ML | Refills: 3 | Status: SHIPPED | OUTPATIENT
Start: 2025-05-22

## 2025-05-22 NOTE — TELEPHONE ENCOUNTER
Mounjaro denied by ins d/t mounjaro being non-preferred medication. Switching to Ozempic 0.25mg q 7 days per Dr. Whiting. RX sent to [facility] in [place].

## 2025-06-12 DIAGNOSIS — E10.9 TYPE 1 DIABETES MELLITUS WITHOUT COMPLICATIONS: ICD-10-CM

## 2025-06-12 RX ORDER — BLOOD-GLUCOSE METER
1 EACH MISCELLANEOUS
Qty: 200 STRIP | Refills: 3 | Status: SHIPPED | OUTPATIENT
Start: 2025-06-12 | End: 2025-06-12

## 2025-06-12 RX ORDER — BLOOD-GLUCOSE METER
EACH MISCELLANEOUS
Qty: 200 EACH | Refills: 3 | Status: SHIPPED | OUTPATIENT
Start: 2025-06-12

## 2025-06-19 DIAGNOSIS — I10 HYPERTENSION, UNSPECIFIED TYPE: ICD-10-CM

## 2025-06-19 DIAGNOSIS — Z11.59 NEED FOR HEPATITIS C SCREENING TEST: ICD-10-CM

## 2025-06-19 DIAGNOSIS — E03.9 HYPOTHYROIDISM, UNSPECIFIED TYPE: ICD-10-CM

## 2025-06-19 DIAGNOSIS — Z11.4 SCREENING FOR HIV (HUMAN IMMUNODEFICIENCY VIRUS): ICD-10-CM

## 2025-06-19 DIAGNOSIS — E10.9 TYPE 1 DIABETES MELLITUS WITHOUT COMPLICATIONS: ICD-10-CM

## 2025-06-19 DIAGNOSIS — E78.5 HYPERLIPIDEMIA, UNSPECIFIED HYPERLIPIDEMIA TYPE: ICD-10-CM

## 2025-06-19 DIAGNOSIS — Z00.00 WELLNESS EXAMINATION: Primary | ICD-10-CM

## 2025-07-06 DIAGNOSIS — L02.91 ABSCESS: ICD-10-CM

## 2025-07-07 RX ORDER — MUPIROCIN 20 MG/G
OINTMENT TOPICAL
Qty: 22 G | Refills: 0 | Status: SHIPPED | OUTPATIENT
Start: 2025-07-07

## 2025-07-17 ENCOUNTER — LAB VISIT (OUTPATIENT)
Dept: LAB | Facility: HOSPITAL | Age: 25
End: 2025-07-17
Attending: FAMILY MEDICINE
Payer: MEDICAID

## 2025-07-17 DIAGNOSIS — Z11.4 SCREENING FOR HIV (HUMAN IMMUNODEFICIENCY VIRUS): ICD-10-CM

## 2025-07-17 DIAGNOSIS — E78.5 HYPERLIPIDEMIA, UNSPECIFIED HYPERLIPIDEMIA TYPE: ICD-10-CM

## 2025-07-17 DIAGNOSIS — Z11.59 NEED FOR HEPATITIS C SCREENING TEST: ICD-10-CM

## 2025-07-17 DIAGNOSIS — Z00.00 WELLNESS EXAMINATION: ICD-10-CM

## 2025-07-17 DIAGNOSIS — I10 HYPERTENSION, UNSPECIFIED TYPE: ICD-10-CM

## 2025-07-17 DIAGNOSIS — E10.9 TYPE 1 DIABETES MELLITUS WITHOUT COMPLICATIONS: ICD-10-CM

## 2025-07-17 DIAGNOSIS — E03.9 HYPOTHYROIDISM, UNSPECIFIED TYPE: ICD-10-CM

## 2025-07-17 LAB
ALBUMIN SERPL-MCNC: 3.3 G/DL (ref 3.5–5)
ALBUMIN/CREAT UR: 2.9 MG/GM CR (ref 0–30)
ALBUMIN/GLOB SERPL: 0.8 RATIO (ref 1.1–2)
ALP SERPL-CCNC: 81 UNIT/L (ref 40–150)
ALT SERPL-CCNC: 17 UNIT/L (ref 0–55)
ANION GAP SERPL CALC-SCNC: 8 MEQ/L
AST SERPL-CCNC: 19 UNIT/L (ref 11–45)
BASOPHILS # BLD AUTO: 0.06 X10(3)/MCL
BASOPHILS NFR BLD AUTO: 1.9 %
BILIRUB SERPL-MCNC: 0.5 MG/DL
BUN SERPL-MCNC: 11 MG/DL (ref 7–18.7)
CALCIUM SERPL-MCNC: 9.3 MG/DL (ref 8.4–10.2)
CHLORIDE SERPL-SCNC: 107 MMOL/L (ref 98–107)
CHOLEST SERPL-MCNC: 168 MG/DL
CHOLEST/HDLC SERPL: 4 {RATIO} (ref 0–5)
CO2 SERPL-SCNC: 27 MMOL/L (ref 22–29)
CREAT SERPL-MCNC: 0.88 MG/DL (ref 0.55–1.02)
CREAT UR-MCNC: 176.8 MG/DL (ref 45–106)
CREAT/UREA NIT SERPL: 13
EOSINOPHIL # BLD AUTO: 0.06 X10(3)/MCL (ref 0–0.9)
EOSINOPHIL NFR BLD AUTO: 1.9 %
ERYTHROCYTE [DISTWIDTH] IN BLOOD BY AUTOMATED COUNT: 14.4 % (ref 11.5–17)
EST. AVERAGE GLUCOSE BLD GHB EST-MCNC: 168.6 MG/DL
GFR SERPLBLD CREATININE-BSD FMLA CKD-EPI: >60 ML/MIN/1.73/M2
GLOBULIN SER-MCNC: 4 GM/DL (ref 2.4–3.5)
GLUCOSE SERPL-MCNC: 143 MG/DL (ref 74–100)
HBA1C MFR BLD: 7.5 %
HCT VFR BLD AUTO: 43 % (ref 37–47)
HCV AB SERPL QL IA: NONREACTIVE
HDLC SERPL-MCNC: 48 MG/DL (ref 35–60)
HGB BLD-MCNC: 14 G/DL (ref 12–16)
HIV 1+2 AB+HIV1 P24 AG SERPL QL IA: NONREACTIVE
IMM GRANULOCYTES # BLD AUTO: 0 X10(3)/MCL (ref 0–0.04)
IMM GRANULOCYTES NFR BLD AUTO: 0 %
LDLC SERPL CALC-MCNC: 102 MG/DL (ref 50–140)
LYMPHOCYTES # BLD AUTO: 0.96 X10(3)/MCL (ref 0.6–4.6)
LYMPHOCYTES NFR BLD AUTO: 30.5 %
MCH RBC QN AUTO: 31.1 PG (ref 27–31)
MCHC RBC AUTO-ENTMCNC: 32.6 G/DL (ref 33–36)
MCV RBC AUTO: 95.6 FL (ref 80–94)
MICROALBUMIN UR-MCNC: 5.2 UG/ML
MONOCYTES # BLD AUTO: 0.29 X10(3)/MCL (ref 0.1–1.3)
MONOCYTES NFR BLD AUTO: 9.2 %
NEUTROPHILS # BLD AUTO: 1.78 X10(3)/MCL (ref 2.1–9.2)
NEUTROPHILS NFR BLD AUTO: 56.5 %
NRBC BLD AUTO-RTO: 0 %
PLATELET # BLD AUTO: 230 X10(3)/MCL (ref 130–400)
PMV BLD AUTO: 10.1 FL (ref 7.4–10.4)
POTASSIUM SERPL-SCNC: 4 MMOL/L (ref 3.5–5.1)
PROT SERPL-MCNC: 7.3 GM/DL (ref 6.4–8.3)
RBC # BLD AUTO: 4.5 X10(6)/MCL (ref 4.2–5.4)
SODIUM SERPL-SCNC: 142 MMOL/L (ref 136–145)
TRIGL SERPL-MCNC: 88 MG/DL (ref 37–140)
TSH SERPL-ACNC: 0.13 UIU/ML (ref 0.35–4.94)
VLDLC SERPL CALC-MCNC: 18 MG/DL
WBC # BLD AUTO: 3.15 X10(3)/MCL (ref 4.5–11.5)

## 2025-07-17 PROCEDURE — 83036 HEMOGLOBIN GLYCOSYLATED A1C: CPT

## 2025-07-17 PROCEDURE — 86803 HEPATITIS C AB TEST: CPT

## 2025-07-17 PROCEDURE — 80061 LIPID PANEL: CPT

## 2025-07-17 PROCEDURE — 82570 ASSAY OF URINE CREATININE: CPT

## 2025-07-17 PROCEDURE — 85025 COMPLETE CBC W/AUTO DIFF WBC: CPT

## 2025-07-17 PROCEDURE — 36415 COLL VENOUS BLD VENIPUNCTURE: CPT

## 2025-07-17 PROCEDURE — 84443 ASSAY THYROID STIM HORMONE: CPT

## 2025-07-17 PROCEDURE — 87389 HIV-1 AG W/HIV-1&-2 AB AG IA: CPT

## 2025-07-17 PROCEDURE — 80053 COMPREHEN METABOLIC PANEL: CPT

## 2025-07-18 ENCOUNTER — TELEPHONE (OUTPATIENT)
Dept: FAMILY MEDICINE | Facility: CLINIC | Age: 25
End: 2025-07-18
Payer: MEDICAID

## 2025-07-18 NOTE — TELEPHONE ENCOUNTER
----- Message from Nurse Wilson sent at 7/18/2025 11:29 AM CDT -----    ----- Message -----  From: Ferny Whiting MD  Sent: 7/17/2025   4:23 PM CDT  To: Katie Dave LPN    Please inform patient of results.     1. Decrease Synthroid 80 mcg daily.  Rx sent to pharmacy.  Repeat TSH in 6 weeks.  Order placed  ----- Message -----  From: Lab, Background User  Sent: 7/17/2025   2:11 PM CDT  To: Ferny Whiting MD

## 2025-07-22 ENCOUNTER — OFFICE VISIT (OUTPATIENT)
Dept: FAMILY MEDICINE | Facility: CLINIC | Age: 25
End: 2025-07-22
Payer: MEDICAID

## 2025-07-22 VITALS
OXYGEN SATURATION: 99 % | RESPIRATION RATE: 18 BRPM | DIASTOLIC BLOOD PRESSURE: 80 MMHG | SYSTOLIC BLOOD PRESSURE: 112 MMHG | HEART RATE: 82 BPM | BODY MASS INDEX: 47.25 KG/M2 | TEMPERATURE: 97 F | WEIGHT: 219 LBS | HEIGHT: 57 IN

## 2025-07-22 DIAGNOSIS — Z00.00 WELLNESS EXAMINATION: Primary | ICD-10-CM

## 2025-07-22 DIAGNOSIS — E03.9 HYPOTHYROIDISM, UNSPECIFIED TYPE: ICD-10-CM

## 2025-07-22 DIAGNOSIS — E10.9 TYPE 1 DIABETES MELLITUS WITHOUT COMPLICATION: ICD-10-CM

## 2025-07-22 PROBLEM — J01.40 ACUTE NON-RECURRENT PANSINUSITIS: Status: RESOLVED | Noted: 2024-11-08 | Resolved: 2025-07-22

## 2025-07-22 PROBLEM — J40 BRONCHITIS: Status: RESOLVED | Noted: 2024-11-08 | Resolved: 2025-07-22

## 2025-07-22 PROBLEM — L02.91 ABSCESS: Status: RESOLVED | Noted: 2024-11-08 | Resolved: 2025-07-22

## 2025-07-22 PROCEDURE — 3061F NEG MICROALBUMINURIA REV: CPT | Mod: CPTII,,,

## 2025-07-22 PROCEDURE — 99395 PREV VISIT EST AGE 18-39: CPT | Mod: ,,,

## 2025-07-22 PROCEDURE — 3008F BODY MASS INDEX DOCD: CPT | Mod: CPTII,,,

## 2025-07-22 PROCEDURE — 3051F HG A1C>EQUAL 7.0%<8.0%: CPT | Mod: CPTII,,,

## 2025-07-22 PROCEDURE — 1160F RVW MEDS BY RX/DR IN RCRD: CPT | Mod: CPTII,,,

## 2025-07-22 PROCEDURE — 1159F MED LIST DOCD IN RCRD: CPT | Mod: CPTII,,,

## 2025-07-22 PROCEDURE — 3074F SYST BP LT 130 MM HG: CPT | Mod: CPTII,,,

## 2025-07-22 PROCEDURE — 3066F NEPHROPATHY DOC TX: CPT | Mod: CPTII,,,

## 2025-07-22 PROCEDURE — 3079F DIAST BP 80-89 MM HG: CPT | Mod: CPTII,,,

## 2025-07-22 NOTE — ASSESSMENT & PLAN NOTE
A1c reviewed; controlled at 7.5  Continue Omnipod 5 insulin pump.  Plan for Lantus 10 units subQ q.h.s. + lispro 1 unit for every 15 g of carbohydrate consumed as backup if unable to obtain Omnipod for any reason.  Continue semaglutide 0.25 mg subQ every 7 days for obesity; BMI: 47.39  Diabetic foot exam UTD  Eye exam scheduled with Ophthalmology, [facility], we will request records.  Discussed importance of healthy lifestyle changes including diet and exercise; follow ADA diet  Return to clinic with any concerns   Keep scheduled upcoming follow-up on 08/12/2025 with endocrinology, PORSHA Khan.

## 2025-07-22 NOTE — PROGRESS NOTES
Family Medicine    Patient ID: [id number]     Chief Complaint: Annual Exam    HPI:     Arlin Daniels is a 25 y.o. female here today for an annual wellness visit and to discuss recent lab results. Overall she feels well. No other complaints today.     Ms. Daniels is a female with type 1 diabetes, followed by endocrinology, PORSHA Khan. She has been prescribed Ozempic (semaglutide) by PCP for approximately 2 months, resulting in some weight loss and improved mobility. She uses a Dexcom continuous glucose monitor and an Omnipod 5 insulin pump to manage her diabetes. Her most recent A1C was 7.5. She reports decreasing leg swelling since starting Ozempic. Tolerating Omnipod 5 and Ozempic without side effects.    Additionally her levothyroxine dose was recently adjusted on 07/18/2025 from 112 to 88 mcg daily due to low TSH. Repeat TSH in 6 weeks was ordered, due on 08/31/2025. Reports compliance with levothyroxine daily; denies insomnia, anxiety, palpitations, lethargy, fatigue, nausea.      She has an upcoming appointment with endocrinology on August 12th for follow-up on her diabetes management.    Vaccinations -   Immunization History   Administered Date(s) Administered    DTaP 2002, 2002, 2002, 09/23/2003, 06/08/2006    HIB 2002, 2002    Hep B / HiB 04/03/2003    Hepatitis A, Pediatric/Adolescent, 2 Dose 03/14/2018, 03/12/2019    Hepatitis B, Pediatric/Adolescent 2002, 2002    IPV 2002, 2002, 2002, 06/08/2006    Influenza 2002, 10/27/2004, 10/23/2009    Influenza - Intranasal 10/23/2009    Influenza - Trivalent (PED) 2002, 10/27/2004    MMR 09/23/2003, 06/08/2006    Meningococcal B, recombinant 03/12/2019    Meningococcal Conjugate (MCV4P) 08/07/2013, 03/14/2018    PPD Test 04/03/2003    Pneumococcal Conjugate - 7 Valent 2002, 2002, 2002    Tdap 08/07/2013    Varicella 04/03/2003, 10/23/2009        Health Maintenance          Date Due Completion Date    Diabetic Eye Exam Never done ---    HPV Vaccines (1 - 3-dose series) Never done ---    Pneumococcal Vaccines (Age 0-49) (1 of 2 - PCV) 03/12/2021 2002    TETANUS VACCINE 08/07/2023 8/7/2013    COVID-19 Vaccine (1 - 2024-25 season) Never done ---    Influenza Vaccine (1) 09/01/2025 11/8/2024 (Declined)    Override on 11/8/2024: Declined (pt declined flu vacc)    Hemoglobin A1c 01/17/2026 7/17/2025    Foot Exam 03/31/2026 3/31/2025    Diabetes Urine Screening 07/17/2026 7/17/2025    Lipid Panel 07/17/2026 7/17/2025    RSV Vaccine (Age 60+ and Pregnant patients) (1 - 1-dose 75+ series) 03/12/2077 ---             Past Medical History:   Diagnosis Date    Diabetes mellitus     Down syndrome     Heart valve insufficiency     Hypothyroidism     Psoriasis         Past Surgical History:   Procedure Laterality Date    TUMOR REMOVAL          Social History     Tobacco Use    Smoking status: Never    Smokeless tobacco: Never   Substance and Sexual Activity    Alcohol use: Not Currently    Drug use: Not Currently    Sexual activity: Not Currently        Current Outpatient Medications   Medication Instructions    acetone, urine, test (KETONE URINE TEST) Strp Check when blood sugars are above 240 mg/dl    albuterol (ACCUNEB) 1.25 mg, Nebulization, Every 6 hours PRN    ARIPiprazole (ABILIFY) 2 mg, Oral, Daily    BAQSIMI 3 mg/actuation Spry by Each Nostril route.    blood sugar diagnostic Strp Check blood sugar 6 - 8  times daily    blood-glucose meter,continuous (DEXCOM G6 ) Misc Use as directed.    blood-glucose sensor (DEXCOM G6 SENSOR) Callie Use every 10 days as directed.    blood-glucose transmitter (DEXCOM G6 TRANSMITTER) Callie Every 90 days as directed.    DULoxetine (CYMBALTA) 60 MG capsule TAKE ONE CAPSULE BY MOUTH ONCE DAILY    fluticasone propionate (FLONASE) 100 mcg, Each Nostril, Daily    HUMALOG KWIKPEN INSULIN 100 unit/mL pen Inject into the skin. ON SLIDING SCALE    insulin  "glargine (LANTUS) 100 unit/mL injection Inject into the skin.    lancets 33 gauge Misc Check blood sugar 6 - 8  times daily    levothyroxine (SYNTHROID) 88 mcg, Oral, Daily    loratadine (CLARITIN) 10 mg tablet TAKE ONE TABLET BY MOUTH DAILY FOR SINUS    mupirocin (BACTROBAN) 2 % ointment APPLY  OINTMENT TOPICALLY TO AFFECTED AREA THREE TIMES DAILY    OMNIPOD 5 G6 PODS, GEN 5, Crtg SMARTSIG:SUB-Q Every Other Day    OMNIPOD 5 G6-G7 PODS, GEN 5, Crtg Change every 2 days as directed. Max dose 80 units per day.    ONETOUCH VERIO TEST STRIPS Strp USE 1 STRIP TO CHECK GLUCOSE EVERY 3 HOURS AS NEEDED    OZEMPIC 0.25 mg, Subcutaneous, Every 7 days    pen needle, diabetic 32 gauge x 5/32" Ndle USE AS DIRECTED FOR 6 INJECTIONS DAILY       Review of patient's allergies indicates:   Allergen Reactions    Medrol [methylprednisolone]         Patient Care Team:  Ferny Whiting MD as PCP - General (Family Medicine)  Rebekah Aguirre MD as Obstetrician (Obstetrics)  John Khan FNP as Nurse Practitioner (Endocrinology)  [facility], [facility]     Subjective:     Review of Systems  General: negative fever, negative chills, negative fatigue, negative weight gain, negative weight loss  Eyes: negative vision changes, negative redness, negative discharge  ENT: negative ear pain, negative nasal congestion, negative sore throat  Cardiovascular: negative chest pain, negative palpitations, negative lower extremity edema  Respiratory: negative cough, negative shortness of breath  Gastrointestinal: negative abdominal pain, negative nausea, negative vomiting, negative diarrhea, negative constipation, negative blood in stool  Genitourinary: negative dysuria, negative hematuria, negative frequency  Musculoskeletal: negative joint pain, negative muscle pain  Skin: negative rash, negative lesion  Neurological: negative headache, negative dizziness, negative numbness, negative tingling  Psychiatric: negative anxiety, negative depression, " "negative sleep difficulty     Objective:     Visit Vitals  /80 (BP Location: Right arm, Patient Position: Sitting)   Pulse 82   Temp 97.3 °F (36.3 °C)   Resp 18   Ht 4' 9" (1.448 m)   Wt 99.3 kg (219 lb)   LMP 07/08/2025 (Approximate)   SpO2 99%   BMI 47.39 kg/m²       Physical Exam  Vitals and nursing note reviewed.   Constitutional:       General: She is not in acute distress.     Appearance: Normal appearance. She is not ill-appearing.   Eyes:      General: No scleral icterus.     Extraocular Movements: Extraocular movements intact.      Conjunctiva/sclera: Conjunctivae normal.      Pupils: Pupils are equal, round, and reactive to light.   Cardiovascular:      Rate and Rhythm: Normal rate and regular rhythm.      Heart sounds: Normal heart sounds. No murmur heard.     No friction rub. No gallop.   Pulmonary:      Effort: Pulmonary effort is normal. No respiratory distress.      Breath sounds: Normal breath sounds. No wheezing, rhonchi or rales.   Abdominal:      General: Abdomen is protuberant. Bowel sounds are normal. There is no distension.      Palpations: Abdomen is soft. There is no mass.      Tenderness: There is no abdominal tenderness. There is no guarding.   Musculoskeletal:      Cervical back: Neck supple.      Right lower leg: No edema.      Left lower leg: No edema.   Skin:     General: Skin is warm and dry.   Neurological:      General: No focal deficit present.      Mental Status: She is alert.   Psychiatric:         Mood and Affect: Mood normal.       Labs Reviewed:     Recent Results (from the past 3 weeks)   Comprehensive Metabolic Panel    Collection Time: 07/17/25  2:03 PM   Result Value Ref Range    Sodium 142 136 - 145 mmol/L    Potassium 4.0 3.5 - 5.1 mmol/L    Chloride 107 98 - 107 mmol/L    CO2 27 22 - 29 mmol/L    Glucose 143 (H) 74 - 100 mg/dL    Blood Urea Nitrogen 11.0 7.0 - 18.7 mg/dL    Creatinine 0.88 0.55 - 1.02 mg/dL    Calcium 9.3 8.4 - 10.2 mg/dL    Protein Total 7.3 6.4 - " 8.3 gm/dL    Albumin 3.3 (L) 3.5 - 5.0 g/dL    Globulin 4.0 (H) 2.4 - 3.5 gm/dL    Albumin/Globulin Ratio 0.8 (L) 1.1 - 2.0 ratio    Bilirubin Total 0.5 <=1.5 mg/dL    ALP 81 40 - 150 unit/L    ALT 17 0 - 55 unit/L    AST 19 11 - 45 unit/L    eGFR >60 mL/min/1.73/m2    Anion Gap 8.0 mEq/L    BUN/Creatinine Ratio 13    Hemoglobin A1C    Collection Time: 07/17/25  2:03 PM   Result Value Ref Range    Hemoglobin A1c 7.5 (H) <=7.0 %    Estimated Average Glucose 168.6 mg/dL   Hepatitis C Antibody    Collection Time: 07/17/25  2:03 PM   Result Value Ref Range    Hep C Ab Interp Nonreactive Nonreactive   HIV 1/2 Ag/Ab (4th Gen)    Collection Time: 07/17/25  2:03 PM   Result Value Ref Range    HIV Nonreactive Nonreactive   Lipid Panel    Collection Time: 07/17/25  2:03 PM   Result Value Ref Range    Cholesterol Total 168 <=200 mg/dL    HDL Cholesterol 48 35 - 60 mg/dL    Triglyceride 88 37 - 140 mg/dL    Cholesterol/HDL Ratio 4 0 - 5    Very Low Density Lipoprotein 18     LDL Cholesterol 102.00 50.00 - 140.00 mg/dL   Microalbumin/Creatinine Ratio, Urine    Collection Time: 07/17/25  2:03 PM   Result Value Ref Range    Urine Microalbumin 5.2 <=30.0 ug/mL    Urine Creatinine 176.8 (H) 45.0 - 106.0 mg/dL    Microalbumin Creatinine Ratio 2.9 0.0 - 30.0 mg/gm Cr   TSH    Collection Time: 07/17/25  2:03 PM   Result Value Ref Range    TSH 0.131 (L) 0.350 - 4.940 uIU/mL   CBC with Differential    Collection Time: 07/17/25  2:03 PM   Result Value Ref Range    WBC 3.15 (L) 4.50 - 11.50 x10(3)/mcL    RBC 4.50 4.20 - 5.40 x10(6)/mcL    Hgb 14.0 12.0 - 16.0 g/dL    Hct 43.0 37.0 - 47.0 %    MCV 95.6 (H) 80.0 - 94.0 fL    MCH 31.1 (H) 27.0 - 31.0 pg    MCHC 32.6 (L) 33.0 - 36.0 g/dL    RDW 14.4 11.5 - 17.0 %    Platelet 230 130 - 400 x10(3)/mcL    MPV 10.1 7.4 - 10.4 fL    Neut % 56.5 %    Lymph % 30.5 %    Mono % 9.2 %    Eos % 1.9 %    Basophil % 1.9 %    Imm Grans % 0.0 %    Neut # 1.78 (L) 2.1 - 9.2 x10(3)/mcL    Lymph # 0.96 0.6 -  4.6 x10(3)/mcL    Mono # 0.29 0.1 - 1.3 x10(3)/mcL    Eos # 0.06 0 - 0.9 x10(3)/mcL    Baso # 0.06 <=0.2 x10(3)/mcL    Imm Gran # 0.00 0.00 - 0.04 x10(3)/mcL    NRBC% 0.0 %        Assessment:       ICD-10-CM ICD-9-CM   1. Wellness examination  Z00.00 V70.0   2. Type 1 diabetes mellitus without complication  E10.9 250.01   3. Hypothyroidism, unspecified type  E03.9 244.9        Plan:     1. Wellness examination  Assessment & Plan:  Lab work discussed with patient/mother  Continue current medication  Continue diet/exercise  Return to clinic with any concerns       2. Type 1 diabetes mellitus without complication  Assessment & Plan:  A1c reviewed; controlled at 7.5  Continue Omnipod 5 insulin pump.  Plan for Lantus 10 units subQ q.h.s. + lispro 1 unit for every 15 g of carbohydrate consumed as backup if unable to obtain Omnipod for any reason.  Continue semaglutide 0.25 mg subQ every 7 days for obesity; BMI: 47.39  Diabetic foot exam UTD  Eye exam scheduled with Ophthalmology, [facility], we will request records.  Discussed importance of healthy lifestyle changes including diet and exercise; follow ADA diet  Return to clinic with any concerns   Keep scheduled upcoming follow-up on 08/12/2025 with endocrinology, PORSHA Khan.       3. Hypothyroidism, unspecified type  Assessment & Plan:  Labs from 07/17/2025 reviewed; TSH low, Synthroid dose decreased from 112 to 88 mcg by PCP on 07/18/2025.  Continue Levothyroxine 88 mcg by mouth daily  Repeat TSH in 6 weeks.  Take medicine on an empty stomach with water (no other medications or beverages). Wait 30 minutes to eat or drink.  Report any symptoms of thinning hair, breaking nails, fatigue, weight gain or loss, palpitations.    Return to clinic with any concerns.    Continue follow-ups with endocrinology, PORSHA Khan.           Follow up in about 4 months (around 11/22/2025) for Chronic Condition, Follow Up. In addition to their scheduled follow up, the patient has also  been instructed to follow up on as needed basis.     Future Appointments   Date Time Provider Department Center   8/12/2025  1:00 PM John Khan FNP [facility] END[facility] [place]    11/10/2025  1:00 PM Ferny Whiting MD [facility]   8/5/2026 10:45 AM Ferny Whiting MD [facility]        This note was generated with the assistance of ambient listening technology. Verbal consent was obtained by the patient and accompanying visitor(s) for the recording of patient appointment to facilitate this note. I attest to having reviewed and edited the generated note for accuracy, though some syntax or spelling errors may persist. Please contact the author of this note for any clarification.      Sravani Rolle NP

## 2025-07-22 NOTE — ASSESSMENT & PLAN NOTE
Lab work discussed with patient/mother  Continue current medication  Continue diet/exercise  Return to clinic with any concerns

## 2025-07-22 NOTE — ASSESSMENT & PLAN NOTE
Labs from 07/17/2025 reviewed; TSH low, Synthroid dose decreased from 112 to 88 mcg by PCP on 07/18/2025.  Continue Levothyroxine 88 mcg by mouth daily  Repeat TSH in 6 weeks.  Take medicine on an empty stomach with water (no other medications or beverages). Wait 30 minutes to eat or drink.  Report any symptoms of thinning hair, breaking nails, fatigue, weight gain or loss, palpitations.    Return to clinic with any concerns.    Continue follow-ups with endocrinology, PORSHA Khan.

## 2025-07-23 ENCOUNTER — TELEPHONE (OUTPATIENT)
Dept: ENDOCRINOLOGY | Facility: CLINIC | Age: 25
End: 2025-07-23
Payer: MEDICAID

## 2025-07-23 NOTE — TELEPHONE ENCOUNTER
Ms. Aguayo called to consult with provider about patient taking Ozempic. Called was forwarded to PORSHA Lopez.

## 2025-07-23 NOTE — TELEPHONE ENCOUNTER
----- Message from Pallavi sent at 7/22/2025 11:01 AM CDT -----  Patient of John Aguayo with [facility] called for consult.        11:01  Thanks,

## 2025-08-02 ENCOUNTER — HOSPITAL ENCOUNTER (EMERGENCY)
Facility: HOSPITAL | Age: 25
Discharge: HOME | End: 2025-08-02
Attending: GENERAL PRACTICE
Payer: MEDICAID

## 2025-08-02 VITALS
RESPIRATION RATE: 20 BRPM | BODY MASS INDEX: 45.97 KG/M2 | HEIGHT: 58 IN | TEMPERATURE: 97 F | OXYGEN SATURATION: 97 % | SYSTOLIC BLOOD PRESSURE: 102 MMHG | WEIGHT: 219 LBS | HEART RATE: 83 BPM | DIASTOLIC BLOOD PRESSURE: 72 MMHG

## 2025-08-02 DIAGNOSIS — R11.0 NAUSEA: ICD-10-CM

## 2025-08-02 DIAGNOSIS — R10.9 ABDOMINAL PAIN: ICD-10-CM

## 2025-08-02 DIAGNOSIS — R19.7 DIARRHEA, UNSPECIFIED TYPE: Primary | ICD-10-CM

## 2025-08-02 LAB
ALBUMIN SERPL-MCNC: 3.5 G/DL (ref 3.5–5)
ALBUMIN/GLOB SERPL: 0.9 RATIO (ref 1.1–2)
ALP SERPL-CCNC: 86 UNIT/L (ref 40–150)
ALT SERPL-CCNC: 14 UNIT/L (ref 0–55)
AMYLASE SERPL-CCNC: 19 UNIT/L (ref 25–125)
ANION GAP SERPL CALC-SCNC: 11 MEQ/L
AST SERPL-CCNC: 16 UNIT/L (ref 11–45)
BASOPHILS # BLD AUTO: 0.02 X10(3)/MCL
BASOPHILS NFR BLD AUTO: 0.3 %
BILIRUB SERPL-MCNC: 0.4 MG/DL
BUN SERPL-MCNC: 14 MG/DL (ref 7–18.7)
CALCIUM SERPL-MCNC: 9.3 MG/DL (ref 8.4–10.2)
CHLORIDE SERPL-SCNC: 107 MMOL/L (ref 98–107)
CO2 SERPL-SCNC: 23 MMOL/L (ref 22–29)
CREAT SERPL-MCNC: 0.94 MG/DL (ref 0.55–1.02)
CREAT/UREA NIT SERPL: 15
EOSINOPHIL # BLD AUTO: 0.08 X10(3)/MCL (ref 0–0.9)
EOSINOPHIL NFR BLD AUTO: 1.3 %
ERYTHROCYTE [DISTWIDTH] IN BLOOD BY AUTOMATED COUNT: 14.1 % (ref 11.5–17)
GFR SERPLBLD CREATININE-BSD FMLA CKD-EPI: >60 ML/MIN/1.73/M2
GLOBULIN SER-MCNC: 4 GM/DL (ref 2.4–3.5)
GLUCOSE SERPL-MCNC: 139 MG/DL (ref 74–100)
HCT VFR BLD AUTO: 41.8 % (ref 37–47)
HGB BLD-MCNC: 13.7 G/DL (ref 12–16)
IMM GRANULOCYTES # BLD AUTO: 0.01 X10(3)/MCL (ref 0–0.04)
IMM GRANULOCYTES NFR BLD AUTO: 0.2 %
LIPASE SERPL-CCNC: 5 U/L
LYMPHOCYTES # BLD AUTO: 1.06 X10(3)/MCL (ref 0.6–4.6)
LYMPHOCYTES NFR BLD AUTO: 17.1 %
MCH RBC QN AUTO: 31.2 PG (ref 27–31)
MCHC RBC AUTO-ENTMCNC: 32.8 G/DL (ref 33–36)
MCV RBC AUTO: 95.2 FL (ref 80–94)
MONOCYTES # BLD AUTO: 0.46 X10(3)/MCL (ref 0.1–1.3)
MONOCYTES NFR BLD AUTO: 7.4 %
NEUTROPHILS # BLD AUTO: 4.56 X10(3)/MCL (ref 2.1–9.2)
NEUTROPHILS NFR BLD AUTO: 73.7 %
NRBC BLD AUTO-RTO: 0 %
PLATELET # BLD AUTO: 202 X10(3)/MCL (ref 130–400)
PMV BLD AUTO: 10.4 FL (ref 7.4–10.4)
POTASSIUM SERPL-SCNC: 4.4 MMOL/L (ref 3.5–5.1)
PROT SERPL-MCNC: 7.5 GM/DL (ref 6.4–8.3)
RBC # BLD AUTO: 4.39 X10(6)/MCL (ref 4.2–5.4)
SODIUM SERPL-SCNC: 141 MMOL/L (ref 136–145)
WBC # BLD AUTO: 6.19 X10(3)/MCL (ref 4.5–11.5)

## 2025-08-02 PROCEDURE — 82150 ASSAY OF AMYLASE: CPT | Performed by: GENERAL PRACTICE

## 2025-08-02 PROCEDURE — 83690 ASSAY OF LIPASE: CPT | Performed by: GENERAL PRACTICE

## 2025-08-02 PROCEDURE — 85025 COMPLETE CBC W/AUTO DIFF WBC: CPT | Performed by: GENERAL PRACTICE

## 2025-08-02 PROCEDURE — 99284 EMERGENCY DEPT VISIT MOD MDM: CPT | Mod: 25

## 2025-08-02 PROCEDURE — 63600175 PHARM REV CODE 636 W HCPCS: Performed by: GENERAL PRACTICE

## 2025-08-02 PROCEDURE — 96374 THER/PROPH/DIAG INJ IV PUSH: CPT

## 2025-08-02 PROCEDURE — 25000003 PHARM REV CODE 250: Performed by: GENERAL PRACTICE

## 2025-08-02 PROCEDURE — 96361 HYDRATE IV INFUSION ADD-ON: CPT

## 2025-08-02 PROCEDURE — 80053 COMPREHEN METABOLIC PANEL: CPT | Performed by: GENERAL PRACTICE

## 2025-08-02 RX ORDER — ONDANSETRON HYDROCHLORIDE 2 MG/ML
4 INJECTION, SOLUTION INTRAVENOUS
Status: COMPLETED | OUTPATIENT
Start: 2025-08-02 | End: 2025-08-02

## 2025-08-02 RX ORDER — ONDANSETRON 4 MG/1
4 TABLET, FILM COATED ORAL EVERY 6 HOURS
Qty: 12 TABLET | Refills: 0 | Status: SHIPPED | OUTPATIENT
Start: 2025-08-02

## 2025-08-02 RX ORDER — DIPHENOXYLATE HYDROCHLORIDE AND ATROPINE SULFATE 2.5; .025 MG/1; MG/1
1 TABLET ORAL 4 TIMES DAILY PRN
Qty: 15 TABLET | Refills: 0 | Status: SHIPPED | OUTPATIENT
Start: 2025-08-02 | End: 2025-08-12

## 2025-08-02 RX ORDER — LOPERAMIDE HYDROCHLORIDE 2 MG/1
4 CAPSULE ORAL
Status: COMPLETED | OUTPATIENT
Start: 2025-08-02 | End: 2025-08-02

## 2025-08-02 RX ADMIN — SODIUM CHLORIDE 1000 ML: 9 INJECTION, SOLUTION INTRAVENOUS at 06:08

## 2025-08-02 RX ADMIN — LOPERAMIDE HYDROCHLORIDE 4 MG: 2 CAPSULE ORAL at 06:08

## 2025-08-02 RX ADMIN — ONDANSETRON 4 MG: 2 INJECTION INTRAMUSCULAR; INTRAVENOUS at 06:08

## 2025-08-02 NOTE — ED PROVIDER NOTES
Encounter Date: 8/2/2025       History     Chief Complaint   Patient presents with    Diarrhea    Vomiting    Nausea     Patient has been having symptoms since Thursday and believes it is due to her Ozempic shot from Wednesday.  (First time taking the medication)      Patient has been having symptoms since Thursday and believes it is due to her Ozempic shot from Wednesday.  (First time taking the medication)    The history is provided by the patient.   Diarrhea   This is a new problem. The current episode started several days ago. The problem occurs 2 to 4 times per day. The problem has been waxing and waning. The patient states that diarrhea does not awaken her from sleep. Associated symptoms include vomiting. Nothing aggravates the symptoms. She has tried nothing for the symptoms.   Emesis   This is a new problem. The current episode started several days ago. The problem has been waxing and waning. The emesis has an appearance of stomach contents. Associated symptoms include diarrhea.   Nausea      Review of patient's allergies indicates:   Allergen Reactions    Medrol [methylprednisolone]      Past Medical History:   Diagnosis Date    Diabetes mellitus     Down syndrome     Heart valve insufficiency     Hypothyroidism     Psoriasis      Past Surgical History:   Procedure Laterality Date    TUMOR REMOVAL       No family history on file.  Social History[1]  Review of Systems   Constitutional: Negative.    HENT: Negative.     Eyes: Negative.    Respiratory: Negative.     Cardiovascular: Negative.    Gastrointestinal:  Positive for diarrhea, nausea and vomiting.   Endocrine: Negative.    Genitourinary: Negative.    Musculoskeletal: Negative.    Skin: Negative.    Allergic/Immunologic: Negative.    Neurological: Negative.    Hematological: Negative.    Psychiatric/Behavioral: Negative.     All other systems reviewed and are negative.      Physical Exam     Initial Vitals [08/02/25 1759]   BP Pulse Resp Temp SpO2    114/78 87 20 96.8 °F (36 °C) 99 %      MAP       --         Physical Exam    ED Course   Procedures  Labs Reviewed   COMPREHENSIVE METABOLIC PANEL - Abnormal       Result Value    Sodium 141      Potassium 4.4      Chloride 107      CO2 23      Glucose 139 (*)     Blood Urea Nitrogen 14.0      Creatinine 0.94      Calcium 9.3      Protein Total 7.5      Albumin 3.5      Globulin 4.0 (*)     Albumin/Globulin Ratio 0.9 (*)     Bilirubin Total 0.4      ALP 86      ALT 14      AST 16      eGFR >60      Anion Gap 11.0      BUN/Creatinine Ratio 15     AMYLASE - Abnormal    Amylase Level 19 (*)    CBC WITH DIFFERENTIAL - Abnormal    WBC 6.19      RBC 4.39      Hgb 13.7      Hct 41.8      MCV 95.2 (*)     MCH 31.2 (*)     MCHC 32.8 (*)     RDW 14.1      Platelet 202      MPV 10.4      Neut % 73.7      Lymph % 17.1      Mono % 7.4      Eos % 1.3      Basophil % 0.3      Imm Grans % 0.2      Neut # 4.56      Lymph # 1.06      Mono # 0.46      Eos # 0.08      Baso # 0.02      Imm Gran # 0.01      NRBC% 0.0     LIPASE - Normal    Lipase Level 5     CBC W/ AUTO DIFFERENTIAL    Narrative:     The following orders were created for panel order CBC auto differential.  Procedure                               Abnormality         Status                     ---------                               -----------         ------                     CBC with Differential[[id number]]       Abnormal            Final result                 Please view results for these tests on the individual orders.          Imaging Results              X-Ray Abdomen Flat And Erect (Final result)  Result time 08/02/25 19:05:47      Final result by Romie Ochoa MD (08/02/25 19:05:47)                   Impression:      Nonspecific bowel gas pattern.      Electronically signed by: Romie Ochoa  Date:    08/02/2025  Time:    19:05               Narrative:    EXAMINATION:  XR ABDOMEN FLAT AND ERECT    CLINICAL HISTORY:  Unspecified abdominal  pain    TECHNIQUE:  Two views    COMPARISON:  June 1, 2021    FINDINGS:  The intestinal gas pattern is nonspecific and nonobstructive. No air fluid levels or pneumoperitoneum identified.  Visualized portion of the lungs are clear.                                       Medications   sodium chloride 0.9% bolus 1,000 mL 1,000 mL (1,000 mLs Intravenous New Bag 8/2/25 1831)   ondansetron injection 4 mg (4 mg Intravenous Given 8/2/25 1835)   loperamide capsule 4 mg (4 mg Oral Given 8/2/25 1836)     Medical Decision Making  Workup is unremarkable.  This appears to be simple diarrhea.  We have given IV fluids as well as anti nausea an antidiarrheal medicine I will give prescription for antinausea and antidiarrheal medications for the patient to take at home.  Advised that they follow up with the patient's primary care provider.  Nothing emergent or urgent noted during workup or exam here.    Amount and/or Complexity of Data Reviewed  Labs: ordered.  Radiology: ordered.    Risk  Prescription drug management.                                          Clinical Impression:  Final diagnoses:  [R11.0] Nausea  [R10.9] Abdominal pain  [R19.7] Diarrhea, unspecified type (Primary)          ED Disposition Condition    Discharge Stable          ED Prescriptions    None       Follow-up Information    None                [1]   Social History  Tobacco Use    Smoking status: Never    Smokeless tobacco: Never   Substance Use Topics    Alcohol use: Not Currently    Drug use: Not Currently        Melvin Mallory MD  08/02/25 [id number]

## 2025-08-05 ENCOUNTER — TELEPHONE (OUTPATIENT)
Dept: FAMILY MEDICINE | Facility: CLINIC | Age: 25
End: 2025-08-05
Payer: MEDICAID

## 2025-08-05 NOTE — TELEPHONE ENCOUNTER
(Mother) Mila called [phone]    She went to [facility] on 8/2/25 for Diarrhea, Vomiting, & Nausea. Mother is concerned that it could be the Ozempic causing her symptoms d/t taking shot on Wednesday and symptoms started Thursday night. Reports having some diarrhea and vomiting since starting Ozempic in June, but never this severe.    Patient mother would like to d/c Ozempic as she is scared to continue giving to Arlin. Her mother expresses concerns about the Abilify or Cymbalta causing the weight gain & questioning if we were to stop one maybe she would not need to take Ozempic.    Please advise, Thanks!    Let me know if I need to schedule a follow up appt!

## 2025-08-11 ENCOUNTER — OFFICE VISIT (OUTPATIENT)
Dept: FAMILY MEDICINE | Facility: CLINIC | Age: 25
End: 2025-08-11
Payer: MEDICAID

## 2025-08-11 VITALS — BODY MASS INDEX: 44.89 KG/M2 | HEIGHT: 58 IN | WEIGHT: 213.88 LBS

## 2025-08-11 DIAGNOSIS — E10.9 TYPE 1 DIABETES MELLITUS WITHOUT COMPLICATION: ICD-10-CM

## 2025-08-11 DIAGNOSIS — R11.0 NAUSEA: ICD-10-CM

## 2025-08-11 DIAGNOSIS — F32.A DEPRESSION, UNSPECIFIED DEPRESSION TYPE: Primary | ICD-10-CM

## 2025-08-11 DIAGNOSIS — R19.7 DIARRHEA, UNSPECIFIED TYPE: ICD-10-CM

## 2025-08-11 PROCEDURE — 1159F MED LIST DOCD IN RCRD: CPT | Mod: CPTII,95,, | Performed by: FAMILY MEDICINE

## 2025-08-11 PROCEDURE — 1160F RVW MEDS BY RX/DR IN RCRD: CPT | Mod: CPTII,95,, | Performed by: FAMILY MEDICINE

## 2025-08-11 PROCEDURE — G2211 COMPLEX E/M VISIT ADD ON: HCPCS | Mod: 95,,, | Performed by: FAMILY MEDICINE

## 2025-08-11 PROCEDURE — 98006 SYNCH AUDIO-VIDEO EST MOD 30: CPT | Mod: 95,,, | Performed by: FAMILY MEDICINE

## 2025-08-11 PROCEDURE — 3061F NEG MICROALBUMINURIA REV: CPT | Mod: CPTII,95,, | Performed by: FAMILY MEDICINE

## 2025-08-11 PROCEDURE — 3066F NEPHROPATHY DOC TX: CPT | Mod: CPTII,95,, | Performed by: FAMILY MEDICINE

## 2025-08-11 PROCEDURE — 3051F HG A1C>EQUAL 7.0%<8.0%: CPT | Mod: CPTII,95,, | Performed by: FAMILY MEDICINE

## 2025-08-11 RX ORDER — ONDANSETRON 4 MG/1
4 TABLET, FILM COATED ORAL EVERY 6 HOURS
Qty: 12 TABLET | Refills: 0 | Status: SHIPPED | OUTPATIENT
Start: 2025-08-11

## 2025-08-11 RX ORDER — ARIPIPRAZOLE 2 MG/1
2 TABLET ORAL DAILY
Qty: 30 TABLET | Refills: 3 | Status: SHIPPED | OUTPATIENT
Start: 2025-08-11 | End: 2025-12-09

## 2025-08-11 RX ORDER — ONDANSETRON 4 MG/1
4 TABLET, FILM COATED ORAL EVERY 6 HOURS
Qty: 12 TABLET | Refills: 0 | Status: SHIPPED | OUTPATIENT
Start: 2025-08-11 | End: 2025-08-11

## 2025-08-11 RX ORDER — INSULIN GLARGINE 100 [IU]/ML
80 INJECTION, SOLUTION SUBCUTANEOUS DAILY
COMMUNITY
Start: 2025-07-06

## 2025-08-11 RX ORDER — TIRZEPATIDE 2.5 MG/.5ML
2.5 INJECTION, SOLUTION SUBCUTANEOUS
Qty: 2 ML | Refills: 3 | Status: SHIPPED | OUTPATIENT
Start: 2025-08-11 | End: 2025-12-09

## 2025-08-11 RX ORDER — INSULIN LISPRO 100 [IU]/ML
100 INJECTION, SOLUTION INTRAVENOUS; SUBCUTANEOUS DAILY
COMMUNITY
Start: 2025-07-06

## 2025-08-12 ENCOUNTER — OFFICE VISIT (OUTPATIENT)
Dept: ENDOCRINOLOGY | Facility: CLINIC | Age: 25
End: 2025-08-12
Payer: MEDICAID

## 2025-08-12 ENCOUNTER — HOSPITAL ENCOUNTER (EMERGENCY)
Facility: HOSPITAL | Age: 25
Discharge: HOME | End: 2025-08-12
Attending: FAMILY MEDICINE
Payer: MEDICAID

## 2025-08-12 VITALS
RESPIRATION RATE: 18 BRPM | TEMPERATURE: 98 F | WEIGHT: 214.06 LBS | DIASTOLIC BLOOD PRESSURE: 57 MMHG | HEIGHT: 55 IN | HEART RATE: 96 BPM | SYSTOLIC BLOOD PRESSURE: 89 MMHG | BODY MASS INDEX: 49.54 KG/M2

## 2025-08-12 VITALS
BODY MASS INDEX: 44.92 KG/M2 | HEART RATE: 82 BPM | OXYGEN SATURATION: 96 % | TEMPERATURE: 97 F | SYSTOLIC BLOOD PRESSURE: 109 MMHG | WEIGHT: 214 LBS | RESPIRATION RATE: 18 BRPM | DIASTOLIC BLOOD PRESSURE: 82 MMHG | HEIGHT: 58 IN

## 2025-08-12 DIAGNOSIS — E10.65 TYPE 1 DIABETES MELLITUS WITH HYPERGLYCEMIA: Primary | ICD-10-CM

## 2025-08-12 DIAGNOSIS — E86.0 DEHYDRATION: ICD-10-CM

## 2025-08-12 DIAGNOSIS — K52.9 GASTROENTERITIS: Primary | ICD-10-CM

## 2025-08-12 DIAGNOSIS — E06.3 HYPOTHYROIDISM DUE TO HASHIMOTO THYROIDITIS: ICD-10-CM

## 2025-08-12 DIAGNOSIS — R11.10 VOMITING, UNSPECIFIED VOMITING TYPE, UNSPECIFIED WHETHER NAUSEA PRESENT: ICD-10-CM

## 2025-08-12 LAB
ALBUMIN SERPL-MCNC: 3.6 G/DL (ref 3.5–5)
ALBUMIN/GLOB SERPL: 0.9 RATIO (ref 1.1–2)
ALP SERPL-CCNC: 91 UNIT/L (ref 40–150)
ALT SERPL-CCNC: 23 UNIT/L (ref 0–55)
ANION GAP SERPL CALC-SCNC: 10 MEQ/L
AST SERPL-CCNC: 23 UNIT/L (ref 11–45)
BASOPHILS # BLD AUTO: 0.06 X10(3)/MCL
BASOPHILS NFR BLD AUTO: 1 %
BILIRUB SERPL-MCNC: 0.3 MG/DL
BUN SERPL-MCNC: 14 MG/DL (ref 7–18.7)
CALCIUM SERPL-MCNC: 9.4 MG/DL (ref 8.4–10.2)
CHLORIDE SERPL-SCNC: 107 MMOL/L (ref 98–107)
CO2 SERPL-SCNC: 26 MMOL/L (ref 22–29)
CREAT SERPL-MCNC: 1.02 MG/DL (ref 0.55–1.02)
CREAT/UREA NIT SERPL: 14
EOSINOPHIL # BLD AUTO: 0.13 X10(3)/MCL (ref 0–0.9)
EOSINOPHIL NFR BLD AUTO: 2.1 %
ERYTHROCYTE [DISTWIDTH] IN BLOOD BY AUTOMATED COUNT: 14.3 % (ref 11.5–17)
GFR SERPLBLD CREATININE-BSD FMLA CKD-EPI: >60 ML/MIN/1.73/M2
GLOBULIN SER-MCNC: 4.2 GM/DL (ref 2.4–3.5)
GLUCOSE SERPL-MCNC: 92 MG/DL (ref 74–100)
HCT VFR BLD AUTO: 43 % (ref 37–47)
HGB BLD-MCNC: 14 G/DL (ref 12–16)
IMM GRANULOCYTES # BLD AUTO: 0.01 X10(3)/MCL (ref 0–0.04)
IMM GRANULOCYTES NFR BLD AUTO: 0.2 %
LYMPHOCYTES # BLD AUTO: 1.49 X10(3)/MCL (ref 0.6–4.6)
LYMPHOCYTES NFR BLD AUTO: 24 %
MCH RBC QN AUTO: 30.9 PG (ref 27–31)
MCHC RBC AUTO-ENTMCNC: 32.6 G/DL (ref 33–36)
MCV RBC AUTO: 94.9 FL (ref 80–94)
MONOCYTES # BLD AUTO: 0.53 X10(3)/MCL (ref 0.1–1.3)
MONOCYTES NFR BLD AUTO: 8.5 %
NEUTROPHILS # BLD AUTO: 3.98 X10(3)/MCL (ref 2.1–9.2)
NEUTROPHILS NFR BLD AUTO: 64.2 %
NRBC BLD AUTO-RTO: 0 %
PLATELET # BLD AUTO: 251 X10(3)/MCL (ref 130–400)
PMV BLD AUTO: 10.7 FL (ref 7.4–10.4)
POTASSIUM SERPL-SCNC: 4.1 MMOL/L (ref 3.5–5.1)
PROT SERPL-MCNC: 7.8 GM/DL (ref 6.4–8.3)
RBC # BLD AUTO: 4.53 X10(6)/MCL (ref 4.2–5.4)
SODIUM SERPL-SCNC: 143 MMOL/L (ref 136–145)
WBC # BLD AUTO: 6.2 X10(3)/MCL (ref 4.5–11.5)

## 2025-08-12 PROCEDURE — 63600175 PHARM REV CODE 636 W HCPCS: Performed by: FAMILY MEDICINE

## 2025-08-12 PROCEDURE — 96361 HYDRATE IV INFUSION ADD-ON: CPT

## 2025-08-12 PROCEDURE — 3066F NEPHROPATHY DOC TX: CPT | Mod: CPTII,,, | Performed by: NURSE PRACTITIONER

## 2025-08-12 PROCEDURE — 3074F SYST BP LT 130 MM HG: CPT | Mod: CPTII,,, | Performed by: NURSE PRACTITIONER

## 2025-08-12 PROCEDURE — 3008F BODY MASS INDEX DOCD: CPT | Mod: CPTII,,, | Performed by: NURSE PRACTITIONER

## 2025-08-12 PROCEDURE — 3078F DIAST BP <80 MM HG: CPT | Mod: CPTII,,, | Performed by: NURSE PRACTITIONER

## 2025-08-12 PROCEDURE — 99284 EMERGENCY DEPT VISIT MOD MDM: CPT | Mod: 25,27

## 2025-08-12 PROCEDURE — 85025 COMPLETE CBC W/AUTO DIFF WBC: CPT | Performed by: FAMILY MEDICINE

## 2025-08-12 PROCEDURE — 96375 TX/PRO/DX INJ NEW DRUG ADDON: CPT

## 2025-08-12 PROCEDURE — 3051F HG A1C>EQUAL 7.0%<8.0%: CPT | Mod: CPTII,,, | Performed by: NURSE PRACTITIONER

## 2025-08-12 PROCEDURE — 96372 THER/PROPH/DIAG INJ SC/IM: CPT | Performed by: FAMILY MEDICINE

## 2025-08-12 PROCEDURE — 1159F MED LIST DOCD IN RCRD: CPT | Mod: CPTII,,, | Performed by: NURSE PRACTITIONER

## 2025-08-12 PROCEDURE — 80053 COMPREHEN METABOLIC PANEL: CPT | Performed by: FAMILY MEDICINE

## 2025-08-12 PROCEDURE — 25000003 PHARM REV CODE 250: Performed by: FAMILY MEDICINE

## 2025-08-12 PROCEDURE — 96374 THER/PROPH/DIAG INJ IV PUSH: CPT

## 2025-08-12 PROCEDURE — 1160F RVW MEDS BY RX/DR IN RCRD: CPT | Mod: CPTII,,, | Performed by: NURSE PRACTITIONER

## 2025-08-12 PROCEDURE — 3061F NEG MICROALBUMINURIA REV: CPT | Mod: CPTII,,, | Performed by: NURSE PRACTITIONER

## 2025-08-12 PROCEDURE — 99215 OFFICE O/P EST HI 40 MIN: CPT | Mod: S$PBB,,, | Performed by: NURSE PRACTITIONER

## 2025-08-12 PROCEDURE — 99215 OFFICE O/P EST HI 40 MIN: CPT | Mod: PBBFAC,25 | Performed by: NURSE PRACTITIONER

## 2025-08-12 RX ORDER — INSULIN PUMP SYRINGE, 3 ML
EACH MISCELLANEOUS
Qty: 1 EACH | Refills: 0 | Status: SHIPPED | OUTPATIENT
Start: 2025-08-12

## 2025-08-12 RX ORDER — DICYCLOMINE HYDROCHLORIDE 10 MG/1
10 CAPSULE ORAL 2 TIMES DAILY PRN
Qty: 15 CAPSULE | Refills: 0 | Status: SHIPPED | OUTPATIENT
Start: 2025-08-12

## 2025-08-12 RX ORDER — ONDANSETRON HYDROCHLORIDE 2 MG/ML
4 INJECTION, SOLUTION INTRAVENOUS
Status: COMPLETED | OUTPATIENT
Start: 2025-08-12 | End: 2025-08-12

## 2025-08-12 RX ORDER — DICYCLOMINE HYDROCHLORIDE 10 MG/1
10 CAPSULE ORAL 2 TIMES DAILY
Qty: 15 CAPSULE | Refills: 0 | Status: SHIPPED | OUTPATIENT
Start: 2025-08-12 | End: 2025-08-12

## 2025-08-12 RX ORDER — LANCETS
EACH MISCELLANEOUS
Qty: 200 EACH | Refills: 6 | Status: SHIPPED | OUTPATIENT
Start: 2025-08-12

## 2025-08-12 RX ORDER — PROCHLORPERAZINE EDISYLATE 5 MG/ML
2.5 INJECTION INTRAMUSCULAR; INTRAVENOUS
Status: COMPLETED | OUTPATIENT
Start: 2025-08-12 | End: 2025-08-12

## 2025-08-12 RX ORDER — DIPHENHYDRAMINE HYDROCHLORIDE 50 MG/ML
12.5 INJECTION, SOLUTION INTRAMUSCULAR; INTRAVENOUS
Status: COMPLETED | OUTPATIENT
Start: 2025-08-12 | End: 2025-08-12

## 2025-08-12 RX ORDER — DICYCLOMINE HYDROCHLORIDE 10 MG/ML
20 INJECTION INTRAMUSCULAR
Status: COMPLETED | OUTPATIENT
Start: 2025-08-12 | End: 2025-08-12

## 2025-08-12 RX ADMIN — SODIUM CHLORIDE 1000 ML: 9 INJECTION, SOLUTION INTRAVENOUS at 04:08

## 2025-08-12 RX ADMIN — DIPHENHYDRAMINE HYDROCHLORIDE 12.5 MG: 50 INJECTION INTRAMUSCULAR; INTRAVENOUS at 05:08

## 2025-08-12 RX ADMIN — ONDANSETRON 4 MG: 2 INJECTION INTRAMUSCULAR; INTRAVENOUS at 04:08

## 2025-08-12 RX ADMIN — PROCHLORPERAZINE EDISYLATE 2.5 MG: 5 INJECTION INTRAMUSCULAR; INTRAVENOUS at 05:08

## 2025-08-12 RX ADMIN — DICYCLOMINE HYDROCHLORIDE 20 MG: 10 INJECTION INTRAMUSCULAR at 04:08

## 2025-08-13 ENCOUNTER — OFFICE VISIT (OUTPATIENT)
Dept: FAMILY MEDICINE | Facility: CLINIC | Age: 25
End: 2025-08-13
Payer: MEDICAID

## 2025-08-13 VITALS
TEMPERATURE: 99 F | SYSTOLIC BLOOD PRESSURE: 95 MMHG | BODY MASS INDEX: 44.92 KG/M2 | HEIGHT: 58 IN | WEIGHT: 214 LBS | DIASTOLIC BLOOD PRESSURE: 58 MMHG | HEART RATE: 71 BPM

## 2025-08-13 DIAGNOSIS — R19.7 DIARRHEA, UNSPECIFIED TYPE: Primary | ICD-10-CM

## 2025-08-13 PROBLEM — E06.3 HYPOTHYROIDISM DUE TO HASHIMOTO THYROIDITIS: Status: ACTIVE | Noted: 2019-11-07

## 2025-08-13 PROCEDURE — 3066F NEPHROPATHY DOC TX: CPT | Mod: CPTII,95,, | Performed by: FAMILY MEDICINE

## 2025-08-13 PROCEDURE — 1160F RVW MEDS BY RX/DR IN RCRD: CPT | Mod: CPTII,95,, | Performed by: FAMILY MEDICINE

## 2025-08-13 PROCEDURE — 3061F NEG MICROALBUMINURIA REV: CPT | Mod: CPTII,95,, | Performed by: FAMILY MEDICINE

## 2025-08-13 PROCEDURE — 3078F DIAST BP <80 MM HG: CPT | Mod: CPTII,95,, | Performed by: FAMILY MEDICINE

## 2025-08-13 PROCEDURE — 3051F HG A1C>EQUAL 7.0%<8.0%: CPT | Mod: CPTII,95,, | Performed by: FAMILY MEDICINE

## 2025-08-13 PROCEDURE — G2211 COMPLEX E/M VISIT ADD ON: HCPCS | Mod: 95,,, | Performed by: FAMILY MEDICINE

## 2025-08-13 PROCEDURE — 3074F SYST BP LT 130 MM HG: CPT | Mod: CPTII,95,, | Performed by: FAMILY MEDICINE

## 2025-08-13 PROCEDURE — 1159F MED LIST DOCD IN RCRD: CPT | Mod: CPTII,95,, | Performed by: FAMILY MEDICINE

## 2025-08-13 PROCEDURE — 98006 SYNCH AUDIO-VIDEO EST MOD 30: CPT | Mod: 95,,, | Performed by: FAMILY MEDICINE

## 2025-08-13 PROCEDURE — 3008F BODY MASS INDEX DOCD: CPT | Mod: CPTII,95,, | Performed by: FAMILY MEDICINE

## 2025-08-13 RX ORDER — DIPHENOXYLATE HYDROCHLORIDE AND ATROPINE SULFATE 2.5; .025 MG/1; MG/1
1 TABLET ORAL 4 TIMES DAILY PRN
Qty: 15 TABLET | Refills: 0 | Status: SHIPPED | OUTPATIENT
Start: 2025-08-13 | End: 2025-08-23

## 2025-08-13 RX ORDER — DIPHENOXYLATE HYDROCHLORIDE AND ATROPINE SULFATE 2.5; .025 MG/1; MG/1
1 TABLET ORAL 4 TIMES DAILY PRN
Qty: 15 TABLET | Refills: 0 | Status: SHIPPED | OUTPATIENT
Start: 2025-08-13 | End: 2025-08-13

## 2025-08-17 DIAGNOSIS — F32.A DEPRESSION, UNSPECIFIED DEPRESSION TYPE: ICD-10-CM

## 2025-08-18 DIAGNOSIS — F32.A DEPRESSION, UNSPECIFIED DEPRESSION TYPE: ICD-10-CM

## 2025-08-18 RX ORDER — DULOXETIN HYDROCHLORIDE 60 MG/1
60 CAPSULE, DELAYED RELEASE ORAL DAILY
Qty: 30 CAPSULE | Refills: 3 | Status: SHIPPED | OUTPATIENT
Start: 2025-08-18

## 2025-08-18 RX ORDER — DULOXETIN HYDROCHLORIDE 60 MG/1
60 CAPSULE, DELAYED RELEASE ORAL
Qty: 30 CAPSULE | Refills: 3 | Status: SHIPPED | OUTPATIENT
Start: 2025-08-18 | End: 2025-08-18 | Stop reason: SDUPTHER

## 2025-08-19 ENCOUNTER — TELEPHONE (OUTPATIENT)
Dept: FAMILY MEDICINE | Facility: CLINIC | Age: 25
End: 2025-08-19
Payer: MEDICAID